# Patient Record
Sex: FEMALE | Race: WHITE | Employment: OTHER | URBAN - METROPOLITAN AREA
[De-identification: names, ages, dates, MRNs, and addresses within clinical notes are randomized per-mention and may not be internally consistent; named-entity substitution may affect disease eponyms.]

---

## 2017-07-01 PROBLEM — S32.000A CLOSED COMPRESSION FRACTURE OF LUMBAR VERTEBRA (HCC): Status: ACTIVE | Noted: 2017-07-01

## 2019-12-02 ENCOUNTER — APPOINTMENT (OUTPATIENT)
Dept: CT IMAGING | Age: 69
End: 2019-12-02
Payer: MEDICARE

## 2019-12-02 ENCOUNTER — HOSPITAL ENCOUNTER (OUTPATIENT)
Age: 69
Setting detail: OBSERVATION
Discharge: HOME | End: 2019-12-04
Attending: EMERGENCY MEDICINE | Admitting: INTERNAL MEDICINE
Payer: MEDICARE

## 2019-12-02 DIAGNOSIS — G45.9 TIA (TRANSIENT ISCHEMIC ATTACK): Primary | ICD-10-CM

## 2019-12-02 LAB
ALBUMIN SERPL-MCNC: 4.1 G/DL (ref 3.5–5.2)
ALP BLD-CCNC: 92 U/L (ref 35–104)
ALT SERPL-CCNC: 8 U/L (ref 0–32)
ANION GAP SERPL CALCULATED.3IONS-SCNC: 15 MMOL/L (ref 7–16)
APTT: 32.3 SEC (ref 24.5–35.1)
AST SERPL-CCNC: 17 U/L (ref 0–31)
BASOPHILS ABSOLUTE: 0.04 E9/L (ref 0–0.2)
BASOPHILS RELATIVE PERCENT: 0.5 % (ref 0–2)
BILIRUB SERPL-MCNC: 0.2 MG/DL (ref 0–1.2)
BUN BLDV-MCNC: 13 MG/DL (ref 8–23)
CALCIUM SERPL-MCNC: 8.8 MG/DL (ref 8.6–10.2)
CHLORIDE BLD-SCNC: 105 MMOL/L (ref 98–107)
CO2: 21 MMOL/L (ref 22–29)
CREAT SERPL-MCNC: 0.8 MG/DL (ref 0.5–1)
EOSINOPHILS ABSOLUTE: 0.07 E9/L (ref 0.05–0.5)
EOSINOPHILS RELATIVE PERCENT: 0.9 % (ref 0–6)
GFR AFRICAN AMERICAN: >60
GFR NON-AFRICAN AMERICAN: >60 ML/MIN/1.73
GLUCOSE BLD-MCNC: 91 MG/DL (ref 74–99)
HCT VFR BLD CALC: 39.1 % (ref 34–48)
HEMOGLOBIN: 13.1 G/DL (ref 11.5–15.5)
IMMATURE GRANULOCYTES #: 0.02 E9/L
IMMATURE GRANULOCYTES %: 0.3 % (ref 0–5)
INR BLD: 0.9
LYMPHOCYTES ABSOLUTE: 1.8 E9/L (ref 1.5–4)
LYMPHOCYTES RELATIVE PERCENT: 24.4 % (ref 20–42)
MCH RBC QN AUTO: 31.6 PG (ref 26–35)
MCHC RBC AUTO-ENTMCNC: 33.5 % (ref 32–34.5)
MCV RBC AUTO: 94.4 FL (ref 80–99.9)
MONOCYTES ABSOLUTE: 0.62 E9/L (ref 0.1–0.95)
MONOCYTES RELATIVE PERCENT: 8.4 % (ref 2–12)
NEUTROPHILS ABSOLUTE: 4.84 E9/L (ref 1.8–7.3)
NEUTROPHILS RELATIVE PERCENT: 65.5 % (ref 43–80)
PDW BLD-RTO: 12.5 FL (ref 11.5–15)
PLATELET # BLD: 274 E9/L (ref 130–450)
PMV BLD AUTO: 9.5 FL (ref 7–12)
POTASSIUM REFLEX MAGNESIUM: 3.7 MMOL/L (ref 3.5–5)
PROTHROMBIN TIME: 10.6 SEC (ref 9.3–12.4)
RBC # BLD: 4.14 E12/L (ref 3.5–5.5)
SODIUM BLD-SCNC: 141 MMOL/L (ref 132–146)
TOTAL PROTEIN: 7.7 G/DL (ref 6.4–8.3)
TROPONIN: <0.01 NG/ML (ref 0–0.03)
WBC # BLD: 7.4 E9/L (ref 4.5–11.5)

## 2019-12-02 PROCEDURE — 85610 PROTHROMBIN TIME: CPT

## 2019-12-02 PROCEDURE — 85025 COMPLETE CBC W/AUTO DIFF WBC: CPT

## 2019-12-02 PROCEDURE — 80053 COMPREHEN METABOLIC PANEL: CPT

## 2019-12-02 PROCEDURE — 36415 COLL VENOUS BLD VENIPUNCTURE: CPT

## 2019-12-02 PROCEDURE — 93005 ELECTROCARDIOGRAM TRACING: CPT | Performed by: EMERGENCY MEDICINE

## 2019-12-02 PROCEDURE — 84484 ASSAY OF TROPONIN QUANT: CPT

## 2019-12-02 PROCEDURE — 85730 THROMBOPLASTIN TIME PARTIAL: CPT

## 2019-12-02 PROCEDURE — 70450 CT HEAD/BRAIN W/O DYE: CPT

## 2019-12-02 PROCEDURE — 99285 EMERGENCY DEPT VISIT HI MDM: CPT

## 2019-12-02 ASSESSMENT — PAIN DESCRIPTION - PAIN TYPE: TYPE: ACUTE PAIN

## 2019-12-02 ASSESSMENT — PAIN DESCRIPTION - DESCRIPTORS: DESCRIPTORS: ACHING

## 2019-12-02 ASSESSMENT — PAIN DESCRIPTION - FREQUENCY: FREQUENCY: CONTINUOUS

## 2019-12-02 ASSESSMENT — PAIN DESCRIPTION - LOCATION: LOCATION: HEAD

## 2019-12-02 ASSESSMENT — PAIN DESCRIPTION - ORIENTATION: ORIENTATION: RIGHT

## 2019-12-02 ASSESSMENT — PAIN DESCRIPTION - PROGRESSION: CLINICAL_PROGRESSION: NOT CHANGED

## 2019-12-03 ENCOUNTER — APPOINTMENT (OUTPATIENT)
Dept: INTERVENTIONAL RADIOLOGY/VASCULAR | Age: 69
End: 2019-12-03
Payer: MEDICARE

## 2019-12-03 ENCOUNTER — APPOINTMENT (OUTPATIENT)
Dept: GENERAL RADIOLOGY | Age: 69
End: 2019-12-03
Payer: MEDICARE

## 2019-12-03 LAB
BACTERIA: NORMAL /HPF
BILIRUBIN URINE: NEGATIVE
BLOOD, URINE: ABNORMAL
CHOLESTEROL, TOTAL: 177 MG/DL (ref 0–199)
CLARITY: CLEAR
COLOR: ABNORMAL
EKG ATRIAL RATE: 86 BPM
EKG P AXIS: 76 DEGREES
EKG P-R INTERVAL: 160 MS
EKG Q-T INTERVAL: 386 MS
EKG QRS DURATION: 86 MS
EKG QTC CALCULATION (BAZETT): 461 MS
EKG R AXIS: -30 DEGREES
EKG T AXIS: 35 DEGREES
EKG VENTRICULAR RATE: 86 BPM
EPITHELIAL CELLS, UA: NORMAL /HPF
GLUCOSE URINE: NEGATIVE MG/DL
HDLC SERPL-MCNC: 65 MG/DL
KETONES, URINE: NEGATIVE MG/DL
LDL CHOLESTEROL CALCULATED: 96 MG/DL (ref 0–99)
LEUKOCYTE ESTERASE, URINE: ABNORMAL
NITRITE, URINE: NEGATIVE
PH UA: 7 (ref 5–9)
PROTEIN UA: NEGATIVE MG/DL
RBC UA: NORMAL /HPF (ref 0–2)
SPECIFIC GRAVITY UA: 1.01 (ref 1–1.03)
TRIGL SERPL-MCNC: 82 MG/DL (ref 0–149)
TSH SERPL DL<=0.05 MIU/L-ACNC: 1.4 UIU/ML (ref 0.27–4.2)
UROBILINOGEN, URINE: 0.2 E.U./DL
VLDLC SERPL CALC-MCNC: 16 MG/DL
WBC UA: NORMAL /HPF (ref 0–5)

## 2019-12-03 PROCEDURE — 71046 X-RAY EXAM CHEST 2 VIEWS: CPT

## 2019-12-03 PROCEDURE — 6370000000 HC RX 637 (ALT 250 FOR IP): Performed by: PSYCHIATRY & NEUROLOGY

## 2019-12-03 PROCEDURE — 96374 THER/PROPH/DIAG INJ IV PUSH: CPT

## 2019-12-03 PROCEDURE — 84443 ASSAY THYROID STIM HORMONE: CPT

## 2019-12-03 PROCEDURE — G0378 HOSPITAL OBSERVATION PER HR: HCPCS

## 2019-12-03 PROCEDURE — 80061 LIPID PANEL: CPT

## 2019-12-03 PROCEDURE — 93880 EXTRACRANIAL BILAT STUDY: CPT

## 2019-12-03 PROCEDURE — 81001 URINALYSIS AUTO W/SCOPE: CPT

## 2019-12-03 PROCEDURE — 93306 TTE W/DOPPLER COMPLETE: CPT

## 2019-12-03 PROCEDURE — 6370000000 HC RX 637 (ALT 250 FOR IP): Performed by: INTERNAL MEDICINE

## 2019-12-03 PROCEDURE — 6370000000 HC RX 637 (ALT 250 FOR IP): Performed by: EMERGENCY MEDICINE

## 2019-12-03 PROCEDURE — 2500000003 HC RX 250 WO HCPCS: Performed by: EMERGENCY MEDICINE

## 2019-12-03 PROCEDURE — 36415 COLL VENOUS BLD VENIPUNCTURE: CPT

## 2019-12-03 RX ORDER — NICOTINE 21 MG/24HR
1 PATCH, TRANSDERMAL 24 HOURS TRANSDERMAL DAILY
Status: DISCONTINUED | OUTPATIENT
Start: 2019-12-03 | End: 2019-12-04 | Stop reason: HOSPADM

## 2019-12-03 RX ORDER — ACETAMINOPHEN 325 MG/1
650 TABLET ORAL EVERY 6 HOURS PRN
Status: DISCONTINUED | OUTPATIENT
Start: 2019-12-03 | End: 2019-12-04 | Stop reason: HOSPADM

## 2019-12-03 RX ORDER — IBUPROFEN 800 MG/1
800 TABLET ORAL EVERY 6 HOURS PRN
Status: DISCONTINUED | OUTPATIENT
Start: 2019-12-03 | End: 2019-12-04 | Stop reason: HOSPADM

## 2019-12-03 RX ORDER — ASPIRIN 81 MG/1
81 TABLET ORAL DAILY
Status: DISCONTINUED | OUTPATIENT
Start: 2019-12-03 | End: 2019-12-04 | Stop reason: HOSPADM

## 2019-12-03 RX ORDER — ATORVASTATIN CALCIUM 20 MG/1
20 TABLET, FILM COATED ORAL NIGHTLY
Status: DISCONTINUED | OUTPATIENT
Start: 2019-12-03 | End: 2019-12-04 | Stop reason: HOSPADM

## 2019-12-03 RX ORDER — ASPIRIN 81 MG/1
324 TABLET, CHEWABLE ORAL ONCE
Status: COMPLETED | OUTPATIENT
Start: 2019-12-03 | End: 2019-12-03

## 2019-12-03 RX ADMIN — ASPIRIN 81 MG 324 MG: 81 TABLET ORAL at 00:35

## 2019-12-03 RX ADMIN — LIDOCAINE HYDROCHLORIDE: 20 SOLUTION ORAL; TOPICAL at 01:39

## 2019-12-03 RX ADMIN — ATORVASTATIN CALCIUM 20 MG: 20 TABLET, FILM COATED ORAL at 22:33

## 2019-12-03 RX ADMIN — FAMOTIDINE 20 MG: 10 INJECTION, SOLUTION INTRAVENOUS at 01:39

## 2019-12-03 RX ADMIN — ASPIRIN 81 MG: 81 TABLET, COATED ORAL at 07:50

## 2019-12-03 ASSESSMENT — PAIN SCALES - GENERAL
PAINLEVEL_OUTOF10: 0

## 2019-12-03 ASSESSMENT — ENCOUNTER SYMPTOMS
EYE REDNESS: 0
ABDOMINAL PAIN: 0
VOMITING: 0
NAUSEA: 0
SHORTNESS OF BREATH: 0

## 2019-12-04 VITALS
TEMPERATURE: 98 F | SYSTOLIC BLOOD PRESSURE: 119 MMHG | OXYGEN SATURATION: 94 % | RESPIRATION RATE: 18 BRPM | HEIGHT: 66 IN | BODY MASS INDEX: 20.27 KG/M2 | WEIGHT: 126.1 LBS | HEART RATE: 75 BPM | DIASTOLIC BLOOD PRESSURE: 73 MMHG

## 2019-12-04 PROCEDURE — 97162 PT EVAL MOD COMPLEX 30 MIN: CPT | Performed by: PHYSICAL THERAPIST

## 2019-12-04 PROCEDURE — 97165 OT EVAL LOW COMPLEX 30 MIN: CPT

## 2019-12-04 PROCEDURE — G0378 HOSPITAL OBSERVATION PER HR: HCPCS

## 2019-12-04 PROCEDURE — 6370000000 HC RX 637 (ALT 250 FOR IP): Performed by: INTERNAL MEDICINE

## 2019-12-04 PROCEDURE — 6370000000 HC RX 637 (ALT 250 FOR IP): Performed by: PSYCHIATRY & NEUROLOGY

## 2019-12-04 RX ORDER — ATORVASTATIN CALCIUM 20 MG/1
20 TABLET, FILM COATED ORAL NIGHTLY
Qty: 30 TABLET | Refills: 3 | Status: SHIPPED | OUTPATIENT
Start: 2019-12-04 | End: 2021-04-02 | Stop reason: ALTCHOICE

## 2019-12-04 RX ORDER — ASPIRIN 81 MG/1
81 TABLET ORAL DAILY
Qty: 30 TABLET | Refills: 3 | COMMUNITY
Start: 2019-12-05

## 2019-12-04 RX ORDER — NICOTINE 21 MG/24HR
1 PATCH, TRANSDERMAL 24 HOURS TRANSDERMAL EVERY 24 HOURS
Qty: 30 PATCH | Refills: 3 | Status: SHIPPED | OUTPATIENT
Start: 2019-12-04 | End: 2021-04-02 | Stop reason: ALTCHOICE

## 2019-12-04 RX ADMIN — IBUPROFEN 800 MG: 800 TABLET, FILM COATED ORAL at 00:22

## 2019-12-04 RX ADMIN — ASPIRIN 81 MG: 81 TABLET, COATED ORAL at 08:19

## 2019-12-04 ASSESSMENT — PAIN DESCRIPTION - FREQUENCY: FREQUENCY: CONTINUOUS

## 2019-12-04 ASSESSMENT — PAIN SCALES - GENERAL
PAINLEVEL_OUTOF10: 3
PAINLEVEL_OUTOF10: 0

## 2019-12-04 ASSESSMENT — PAIN DESCRIPTION - PROGRESSION
CLINICAL_PROGRESSION: NOT CHANGED
CLINICAL_PROGRESSION: NOT CHANGED

## 2019-12-04 ASSESSMENT — PAIN DESCRIPTION - DESCRIPTORS: DESCRIPTORS: ACHING;HEADACHE

## 2019-12-04 ASSESSMENT — PAIN DESCRIPTION - PAIN TYPE: TYPE: ACUTE PAIN

## 2019-12-04 ASSESSMENT — PAIN - FUNCTIONAL ASSESSMENT: PAIN_FUNCTIONAL_ASSESSMENT: ACTIVITIES ARE NOT PREVENTED

## 2019-12-04 ASSESSMENT — PAIN DESCRIPTION - ONSET: ONSET: ON-GOING

## 2019-12-04 ASSESSMENT — PAIN DESCRIPTION - LOCATION: LOCATION: HEAD

## 2020-01-03 ENCOUNTER — APPOINTMENT (OUTPATIENT)
Dept: CT IMAGING | Age: 70
End: 2020-01-03
Payer: MEDICARE

## 2020-01-03 ENCOUNTER — HOSPITAL ENCOUNTER (EMERGENCY)
Age: 70
Discharge: HOME | End: 2020-01-03
Attending: EMERGENCY MEDICINE
Payer: MEDICARE

## 2020-01-03 VITALS
SYSTOLIC BLOOD PRESSURE: 116 MMHG | TEMPERATURE: 97.7 F | BODY MASS INDEX: 20.57 KG/M2 | HEART RATE: 85 BPM | WEIGHT: 128 LBS | RESPIRATION RATE: 16 BRPM | DIASTOLIC BLOOD PRESSURE: 79 MMHG | OXYGEN SATURATION: 95 % | HEIGHT: 66 IN

## 2020-01-03 LAB
ALBUMIN SERPL-MCNC: 3.7 G/DL (ref 3.5–5.2)
ALP BLD-CCNC: 109 U/L (ref 35–104)
ALT SERPL-CCNC: 22 U/L (ref 0–32)
ANION GAP SERPL CALCULATED.3IONS-SCNC: 10 MMOL/L (ref 7–16)
AST SERPL-CCNC: 25 U/L (ref 0–31)
BILIRUB SERPL-MCNC: 0.2 MG/DL (ref 0–1.2)
BILIRUBIN DIRECT: <0.2 MG/DL (ref 0–0.3)
BILIRUBIN, INDIRECT: ABNORMAL MG/DL (ref 0–1)
BUN BLDV-MCNC: 17 MG/DL (ref 8–23)
CALCIUM SERPL-MCNC: 8.7 MG/DL (ref 8.6–10.2)
CHLORIDE BLD-SCNC: 104 MMOL/L (ref 98–107)
CO2: 24 MMOL/L (ref 22–29)
CREAT SERPL-MCNC: 0.8 MG/DL (ref 0.5–1)
GFR AFRICAN AMERICAN: >60
GFR NON-AFRICAN AMERICAN: >60 ML/MIN/1.73
GLUCOSE BLD-MCNC: 108 MG/DL (ref 74–99)
HCT VFR BLD CALC: 38.8 % (ref 34–48)
HEMOGLOBIN: 12.9 G/DL (ref 11.5–15.5)
LACTIC ACID: 1.3 MMOL/L (ref 0.5–2.2)
LIPASE: 39 U/L (ref 13–60)
MCH RBC QN AUTO: 31.4 PG (ref 26–35)
MCHC RBC AUTO-ENTMCNC: 33.2 % (ref 32–34.5)
MCV RBC AUTO: 94.4 FL (ref 80–99.9)
PDW BLD-RTO: 12.1 FL (ref 11.5–15)
PLATELET # BLD: 274 E9/L (ref 130–450)
PMV BLD AUTO: 9.3 FL (ref 7–12)
POTASSIUM SERPL-SCNC: 4.2 MMOL/L (ref 3.5–5)
RBC # BLD: 4.11 E12/L (ref 3.5–5.5)
SODIUM BLD-SCNC: 138 MMOL/L (ref 132–146)
TOTAL PROTEIN: 7.1 G/DL (ref 6.4–8.3)
TROPONIN: <0.01 NG/ML (ref 0–0.03)
WBC # BLD: 5.5 E9/L (ref 4.5–11.5)

## 2020-01-03 PROCEDURE — 83605 ASSAY OF LACTIC ACID: CPT

## 2020-01-03 PROCEDURE — 94760 N-INVAS EAR/PLS OXIMETRY 1: CPT

## 2020-01-03 PROCEDURE — 80076 HEPATIC FUNCTION PANEL: CPT

## 2020-01-03 PROCEDURE — 93005 ELECTROCARDIOGRAM TRACING: CPT | Performed by: EMERGENCY MEDICINE

## 2020-01-03 PROCEDURE — 74174 CTA ABD&PLVS W/CONTRAST: CPT

## 2020-01-03 PROCEDURE — 71275 CT ANGIOGRAPHY CHEST: CPT

## 2020-01-03 PROCEDURE — 80048 BASIC METABOLIC PNL TOTAL CA: CPT

## 2020-01-03 PROCEDURE — 85027 COMPLETE CBC AUTOMATED: CPT

## 2020-01-03 PROCEDURE — 83690 ASSAY OF LIPASE: CPT

## 2020-01-03 PROCEDURE — 36415 COLL VENOUS BLD VENIPUNCTURE: CPT

## 2020-01-03 PROCEDURE — 84484 ASSAY OF TROPONIN QUANT: CPT

## 2020-01-03 PROCEDURE — 2580000003 HC RX 258: Performed by: EMERGENCY MEDICINE

## 2020-01-03 PROCEDURE — 6360000004 HC RX CONTRAST MEDICATION: Performed by: RADIOLOGY

## 2020-01-03 PROCEDURE — 99284 EMERGENCY DEPT VISIT MOD MDM: CPT

## 2020-01-03 RX ORDER — SODIUM CHLORIDE 0.9 % (FLUSH) 0.9 %
10 SYRINGE (ML) INJECTION PRN
Status: DISCONTINUED | OUTPATIENT
Start: 2020-01-03 | End: 2020-01-03 | Stop reason: HOSPADM

## 2020-01-03 RX ORDER — 0.9 % SODIUM CHLORIDE 0.9 %
1000 INTRAVENOUS SOLUTION INTRAVENOUS ONCE
Status: COMPLETED | OUTPATIENT
Start: 2020-01-03 | End: 2020-01-03

## 2020-01-03 RX ADMIN — IOPAMIDOL 80 ML: 755 INJECTION, SOLUTION INTRAVENOUS at 15:18

## 2020-01-03 RX ADMIN — SODIUM CHLORIDE 1000 ML: 9 INJECTION, SOLUTION INTRAVENOUS at 14:08

## 2020-01-03 ASSESSMENT — ENCOUNTER SYMPTOMS
SHORTNESS OF BREATH: 0
DIARRHEA: 0
COUGH: 0
EYE DISCHARGE: 0
BACK PAIN: 0
VOMITING: 0
EYE PAIN: 0
ABDOMINAL DISTENTION: 0
WHEEZING: 0
SINUS PRESSURE: 0
NAUSEA: 0
SORE THROAT: 0
EYE REDNESS: 0

## 2020-01-03 NOTE — ED PROVIDER NOTES
Patient states she was up washing the nurses when she suddenly had a stabbing pain between her shoulder blades. The pain was so bad that she felt she was getting pass out. She made it to a chair where she believes she passed out for \"a brief second. \". She states the pain lasted 10 to 15 minutes or so then fairly rapidly resolved. No pain currently. No shortness of breath but she did have some nausea. No emesis. The history is provided by the patient. Loss of Consciousness   Episode history:  Single  Most recent episode: Today  Timing:  Constant  Progression:  Resolved  Chronicity:  New  Context comment:  Pain  Witnessed: no    Relieved by:  None tried  Worsened by:  Nothing  Ineffective treatments:  None tried  Associated symptoms: no chest pain, no fever, no headaches, no nausea, no shortness of breath, no vomiting and no weakness         Review of Systems   Constitutional: Negative for chills and fever. HENT: Negative for ear pain, sinus pressure and sore throat. Eyes: Negative for pain, discharge and redness. Respiratory: Negative for cough, shortness of breath and wheezing. Cardiovascular: Positive for syncope. Negative for chest pain. Gastrointestinal: Negative for abdominal distention, diarrhea, nausea and vomiting. Genitourinary: Negative for dysuria and frequency. Musculoskeletal: Negative for arthralgias and back pain. Skin: Negative for rash and wound. Neurological: Negative for weakness and headaches. Hematological: Negative for adenopathy. All other systems reviewed and are negative. Physical Exam  Vitals signs and nursing note reviewed. Constitutional:       Appearance: She is well-developed. HENT:      Head: Normocephalic and atraumatic. Eyes:      Pupils: Pupils are equal, round, and reactive to light. Neck:      Musculoskeletal: Normal range of motion and neck supple. Cardiovascular:      Rate and Rhythm: Normal rate and regular rhythm.       Heart sounds: Normal heart sounds. No murmur. Pulmonary:      Effort: Pulmonary effort is normal. No respiratory distress. Breath sounds: Normal breath sounds. No wheezing or rales. Abdominal:      General: Bowel sounds are normal.      Palpations: Abdomen is soft. Tenderness: There is no tenderness. There is no guarding or rebound. Skin:     General: Skin is warm and dry. Neurological:      Mental Status: She is alert and oriented to person, place, and time. Cranial Nerves: No cranial nerve deficit. Coordination: Coordination normal.          Procedures     MDM     EKG: This EKG is signed and interpreted by me. Rate: 73  Rhythm: Sinus  Interpretation: no acute changes and non-specific EKG, old inferior infarct. Comparison: stable as compared to patient's most recent EKG           --------------------------------------------- PAST HISTORY ---------------------------------------------  Past Medical History:  has a past medical history of AAA [facility]ab[facility] Arthritis, Blood circulation, collateral, CAD (coronary artery disease), COPD (chronic obstructive pulmonary disease) ([facility] Emphysema with chronic bronchitis ([facility] GERD (gastroesophageal reflux disease), Hypothyroidism, MI (myocardial infarction) ([facility] Substance abuse ([facility] Thoracic outlet syndrome, TIA (transient ischemic attack), and Unspecified cerebral artery occlusion with cerebral infarction. Past Surgical History:  has a past surgical history that includes Tonsillectomy and adenoidectomy; Uterine Suspension; Appendectomy; Ovary removal; and Tubal ligation (Bilateral). Social History:  reports that she has been smoking cigarettes. She has a 51.00 pack-year smoking history. She has never used smokeless tobacco. She reports previous alcohol use. She reports that she does not use drugs.     Family History: family history includes Cancer in her father, mother, and another family member; Dementia in her mother; Diabetes in her mother; Heart Disease in her father and mother; Liver Disease in her father. The patients home medications have been reviewed. Allergies: Penicillins; Tetanus toxoids; Ciprofloxacin; Effexor [venlafaxine]; Betadine [povidone iodine];  Codeine; Demerol; Sulfa antibiotics; and Tape [adhesive tape]    -------------------------------------------------- RESULTS -------------------------------------------------  Labs:  Results for orders placed or performed during the hospital encounter of 62/41/61   Basic metabolic panel   Result Value Ref Range    Sodium 138 132 - 146 mmol/L    Potassium 4.2 3.5 - 5.0 mmol/L    Chloride 104 98 - 107 mmol/L    CO2 24 22 - 29 mmol/L    Anion Gap 10 7 - 16 mmol/L    Glucose 108 (H) 74 - 99 mg/dL    BUN 17 8 - 23 mg/dL    CREATININE 0.8 0.5 - 1.0 mg/dL    GFR Non-African American >60 >=60 mL/min/1.73    GFR African American >60     Calcium 8.7 8.6 - 10.2 mg/dL   CBC   Result Value Ref Range    WBC 5.5 4.5 - 11.5 E9/L    RBC 4.11 3.50 - 5.50 E12/L    Hemoglobin 12.9 11.5 - 15.5 g/dL    Hematocrit 38.8 34.0 - 48.0 %    MCV 94.4 80.0 - 99.9 fL    MCH 31.4 26.0 - 35.0 pg    MCHC 33.2 32.0 - 34.5 %    RDW 12.1 11.5 - 15.0 fL    Platelets 461 042 - 300 E9/L    MPV 9.3 7.0 - 12.0 fL   Troponin   Result Value Ref Range    Troponin <0.01 0.00 - 0.03 ng/mL   Lipase   Result Value Ref Range    Lipase 39 13 - 60 U/L   Hepatic Function Panel   Result Value Ref Range    Total Protein 7.1 6.4 - 8.3 g/dL    Alb 3.7 3.5 - 5.2 g/dL    Alkaline Phosphatase 109 (H) 35 - 104 U/L    ALT 22 0 - 32 U/L    AST 25 0 - 31 U/L    Total Bilirubin 0.2 0.0 - 1.2 mg/dL    Bilirubin, Direct <0.2 0.0 - 0.3 mg/dL    Bilirubin, Indirect see below 0.0 - 1.0 mg/dL   Lactic Acid, Plasma   Result Value Ref Range    Lactic Acid 1.3 0.5 - 2.2 mmol/L   EKG 12 Lead   Result Value Ref Range    Ventricular Rate 73 BPM    Atrial Rate 73 BPM    P-R Interval 178 ms    QRS Duration 84 ms    Q-T Interval 424 ms    QTc Calculation (Bazett) 467 ms    P Axis 70 degrees    R Axis -16 degrees    T Axis 25 degrees       Radiology:  CTA ABDOMEN PELVIS W CONTRAST   Final Result   1. There is no evidence of a dissection of the abdominal aorta. 2. Ectasia of the suprarenal abdominal aorta measuring 3.0 x 2.7 cm in   maximum AP and transverse diameter. 3. Sigmoid diverticulosis. There are no findings of diverticulitis. CTA CHEST W CONTRAST    (Results Pending)       ------------------------- NURSING NOTES AND VITALS REVIEWED ---------------------------  Date / Time Roomed:  1/3/2020  1:26 PM  ED Bed Assignment:  19/19    The nursing notes within the ED encounter and vital signs as below have been reviewed. /69   Pulse 72   Temp 97.7 °F (36.5 °C) (Oral)   Resp 15   Ht 5' 6\" (1.676 m)   Wt 128 lb (58.1 kg)   SpO2 97%   BMI 20.66 kg/m²   Oxygen Saturation Interpretation: Normal      ------------------------------------------ PROGRESS NOTES ------------------------------------------  4:02 PM  I have spoken with the patient and discussed todays results, in addition to providing specific details for the plan of care and counseling regarding the diagnosis and prognosis. Their questions are answered at this time and they are agreeable with the plan. I discussed at length with them reasons for immediate return here for re evaluation. They will followup with their primary care physician by calling their office on Monday.      --------------------------------- ADDITIONAL PROVIDER NOTES ---------------------------------  At this time the patient is without objective evidence of an acute process requiring hospitalization or inpatient management. They have remained hemodynamically stable throughout their entire ED visit and are stable for discharge with outpatient follow-up.      The plan has been discussed in detail and they are aware of the specific conditions for emergent return, as well as the importance of

## 2020-01-04 LAB
EKG ATRIAL RATE: 73 BPM
EKG P AXIS: 70 DEGREES
EKG P-R INTERVAL: 178 MS
EKG Q-T INTERVAL: 424 MS
EKG QRS DURATION: 84 MS
EKG QTC CALCULATION (BAZETT): 467 MS
EKG R AXIS: -16 DEGREES
EKG T AXIS: 25 DEGREES
EKG VENTRICULAR RATE: 73 BPM

## 2020-01-04 PROCEDURE — 93010 ELECTROCARDIOGRAM REPORT: CPT | Performed by: INTERNAL MEDICINE

## 2021-04-02 ENCOUNTER — HOSPITAL ENCOUNTER (INPATIENT)
Age: 71
LOS: 11 days | Discharge: HOME | DRG: 177 | End: 2021-04-13
Attending: EMERGENCY MEDICINE | Admitting: INTERNAL MEDICINE
Payer: MEDICARE

## 2021-04-02 ENCOUNTER — APPOINTMENT (OUTPATIENT)
Dept: GENERAL RADIOLOGY | Age: 71
DRG: 177 | End: 2021-04-02
Payer: MEDICARE

## 2021-04-02 ENCOUNTER — APPOINTMENT (OUTPATIENT)
Dept: CT IMAGING | Age: 71
DRG: 177 | End: 2021-04-02
Payer: MEDICARE

## 2021-04-02 DIAGNOSIS — J44.1 COPD EXACERBATION (HCC): ICD-10-CM

## 2021-04-02 DIAGNOSIS — J18.9 COMMUNITY ACQUIRED PNEUMONIA OF RIGHT LOWER LOBE OF LUNG: Primary | ICD-10-CM

## 2021-04-02 LAB
ANION GAP SERPL CALCULATED.3IONS-SCNC: 12 MMOL/L (ref 7–16)
BASOPHILS ABSOLUTE: 0.01 E9/L (ref 0–0.2)
BASOPHILS RELATIVE PERCENT: 0.3 % (ref 0–2)
BUN BLDV-MCNC: 13 MG/DL (ref 8–23)
CALCIUM SERPL-MCNC: 7.7 MG/DL (ref 8.6–10.2)
CHLORIDE BLD-SCNC: 106 MMOL/L (ref 98–107)
CO2: 20 MMOL/L (ref 22–29)
CREAT SERPL-MCNC: 0.8 MG/DL (ref 0.5–1)
EKG ATRIAL RATE: 95 BPM
EKG P AXIS: 81 DEGREES
EKG P-R INTERVAL: 156 MS
EKG Q-T INTERVAL: 356 MS
EKG QRS DURATION: 84 MS
EKG QTC CALCULATION (BAZETT): 447 MS
EKG R AXIS: -36 DEGREES
EKG T AXIS: 51 DEGREES
EKG VENTRICULAR RATE: 95 BPM
EOSINOPHILS ABSOLUTE: 0 E9/L (ref 0.05–0.5)
EOSINOPHILS RELATIVE PERCENT: 0 % (ref 0–6)
GFR AFRICAN AMERICAN: >60
GFR NON-AFRICAN AMERICAN: >60 ML/MIN/1.73
GLUCOSE BLD-MCNC: 85 MG/DL (ref 74–99)
HCT VFR BLD CALC: 40.6 % (ref 34–48)
HEMOGLOBIN: 13.5 G/DL (ref 11.5–15.5)
IMMATURE GRANULOCYTES #: 0 E9/L
IMMATURE GRANULOCYTES %: 0 % (ref 0–5)
INR BLD: 1
LACTIC ACID, SEPSIS: 1 MMOL/L (ref 0.5–1.9)
LACTIC ACID, SEPSIS: 2.3 MMOL/L (ref 0.5–1.9)
LYMPHOCYTES ABSOLUTE: 0.85 E9/L (ref 1.5–4)
LYMPHOCYTES RELATIVE PERCENT: 26.9 % (ref 20–42)
MAGNESIUM: 2 MG/DL (ref 1.6–2.6)
MCH RBC QN AUTO: 31.2 PG (ref 26–35)
MCHC RBC AUTO-ENTMCNC: 33.3 % (ref 32–34.5)
MCV RBC AUTO: 93.8 FL (ref 80–99.9)
MONOCYTES ABSOLUTE: 0.43 E9/L (ref 0.1–0.95)
MONOCYTES RELATIVE PERCENT: 13.6 % (ref 2–12)
NEUTROPHILS ABSOLUTE: 1.87 E9/L (ref 1.8–7.3)
NEUTROPHILS RELATIVE PERCENT: 59.2 % (ref 43–80)
PDW BLD-RTO: 12.9 FL (ref 11.5–15)
PLATELET # BLD: 217 E9/L (ref 130–450)
PMV BLD AUTO: 10 FL (ref 7–12)
POTASSIUM REFLEX MAGNESIUM: 3.4 MMOL/L (ref 3.5–5)
PRO-BNP: 96 PG/ML (ref 0–125)
PROTHROMBIN TIME: 12.1 SEC (ref 9.3–12.4)
RBC # BLD: 4.33 E12/L (ref 3.5–5.5)
SARS-COV-2, NAAT: NOT DETECTED
SODIUM BLD-SCNC: 138 MMOL/L (ref 132–146)
TROPONIN: <0.01 NG/ML (ref 0–0.03)
WBC # BLD: 3.2 E9/L (ref 4.5–11.5)

## 2021-04-02 PROCEDURE — 96361 HYDRATE IV INFUSION ADD-ON: CPT

## 2021-04-02 PROCEDURE — 87635 SARS-COV-2 COVID-19 AMP PRB: CPT

## 2021-04-02 PROCEDURE — 6370000000 HC RX 637 (ALT 250 FOR IP): Performed by: INTERNAL MEDICINE

## 2021-04-02 PROCEDURE — 2700000000 HC OXYGEN THERAPY PER DAY

## 2021-04-02 PROCEDURE — 1200000000 HC SEMI PRIVATE

## 2021-04-02 PROCEDURE — 99285 EMERGENCY DEPT VISIT HI MDM: CPT

## 2021-04-02 PROCEDURE — 84484 ASSAY OF TROPONIN QUANT: CPT

## 2021-04-02 PROCEDURE — 6370000000 HC RX 637 (ALT 250 FOR IP): Performed by: EMERGENCY MEDICINE

## 2021-04-02 PROCEDURE — 85025 COMPLETE CBC W/AUTO DIFF WBC: CPT

## 2021-04-02 PROCEDURE — 85610 PROTHROMBIN TIME: CPT

## 2021-04-02 PROCEDURE — 87040 BLOOD CULTURE FOR BACTERIA: CPT

## 2021-04-02 PROCEDURE — 6360000002 HC RX W HCPCS: Performed by: EMERGENCY MEDICINE

## 2021-04-02 PROCEDURE — 80048 BASIC METABOLIC PNL TOTAL CA: CPT

## 2021-04-02 PROCEDURE — 93005 ELECTROCARDIOGRAM TRACING: CPT | Performed by: EMERGENCY MEDICINE

## 2021-04-02 PROCEDURE — XW033E5 INTRODUCTION OF REMDESIVIR ANTI-INFECTIVE INTO PERIPHERAL VEIN, PERCUTANEOUS APPROACH, NEW TECHNOLOGY GROUP 5: ICD-10-PCS | Performed by: INTERNAL MEDICINE

## 2021-04-02 PROCEDURE — 36415 COLL VENOUS BLD VENIPUNCTURE: CPT

## 2021-04-02 PROCEDURE — 2580000003 HC RX 258: Performed by: EMERGENCY MEDICINE

## 2021-04-02 PROCEDURE — 83605 ASSAY OF LACTIC ACID: CPT

## 2021-04-02 PROCEDURE — 6360000002 HC RX W HCPCS: Performed by: INTERNAL MEDICINE

## 2021-04-02 PROCEDURE — 83880 ASSAY OF NATRIURETIC PEPTIDE: CPT

## 2021-04-02 PROCEDURE — 83735 ASSAY OF MAGNESIUM: CPT

## 2021-04-02 PROCEDURE — 94640 AIRWAY INHALATION TREATMENT: CPT

## 2021-04-02 PROCEDURE — 71250 CT THORAX DX C-: CPT

## 2021-04-02 PROCEDURE — 96374 THER/PROPH/DIAG INJ IV PUSH: CPT

## 2021-04-02 PROCEDURE — 71046 X-RAY EXAM CHEST 2 VIEWS: CPT

## 2021-04-02 RX ORDER — IPRATROPIUM BROMIDE AND ALBUTEROL SULFATE 2.5; .5 MG/3ML; MG/3ML
1 SOLUTION RESPIRATORY (INHALATION)
Status: DISCONTINUED | OUTPATIENT
Start: 2021-04-02 | End: 2021-04-07

## 2021-04-02 RX ORDER — DOXYCYCLINE HYCLATE 100 MG/1
100 CAPSULE ORAL ONCE
Status: COMPLETED | OUTPATIENT
Start: 2021-04-02 | End: 2021-04-02

## 2021-04-02 RX ORDER — POTASSIUM CHLORIDE 20 MEQ/1
20 TABLET, EXTENDED RELEASE ORAL 2 TIMES DAILY WITH MEALS
Status: DISCONTINUED | OUTPATIENT
Start: 2021-04-02 | End: 2021-04-05

## 2021-04-02 RX ORDER — ASPIRIN 81 MG/1
81 TABLET ORAL DAILY
Status: DISCONTINUED | OUTPATIENT
Start: 2021-04-02 | End: 2021-04-13 | Stop reason: HOSPADM

## 2021-04-02 RX ORDER — IBUPROFEN 800 MG/1
800 TABLET ORAL EVERY 8 HOURS PRN
Status: DISCONTINUED | OUTPATIENT
Start: 2021-04-02 | End: 2021-04-13 | Stop reason: HOSPADM

## 2021-04-02 RX ORDER — CALCIUM CARBONATE 1000 MG/1
1000 TABLET, CHEWABLE ORAL 3 TIMES DAILY PRN
COMMUNITY

## 2021-04-02 RX ORDER — ACETAMINOPHEN 500 MG
500 TABLET ORAL EVERY 6 HOURS PRN
Status: DISCONTINUED | OUTPATIENT
Start: 2021-04-02 | End: 2021-04-13 | Stop reason: HOSPADM

## 2021-04-02 RX ORDER — 0.9 % SODIUM CHLORIDE 0.9 %
1000 INTRAVENOUS SOLUTION INTRAVENOUS ONCE
Status: COMPLETED | OUTPATIENT
Start: 2021-04-02 | End: 2021-04-02

## 2021-04-02 RX ORDER — SODIUM CHLORIDE AND POTASSIUM CHLORIDE .9; .15 G/100ML; G/100ML
SOLUTION INTRAVENOUS CONTINUOUS
Status: DISCONTINUED | OUTPATIENT
Start: 2021-04-02 | End: 2021-04-05

## 2021-04-02 RX ORDER — AZITHROMYCIN 250 MG/1
500 TABLET, FILM COATED ORAL DAILY
Status: DISCONTINUED | OUTPATIENT
Start: 2021-04-02 | End: 2021-04-13 | Stop reason: HOSPADM

## 2021-04-02 RX ORDER — CALCIUM CARBONATE 200(500)MG
1 TABLET,CHEWABLE ORAL 3 TIMES DAILY PRN
Status: DISCONTINUED | OUTPATIENT
Start: 2021-04-02 | End: 2021-04-13 | Stop reason: HOSPADM

## 2021-04-02 RX ORDER — IBUPROFEN 200 MG
400 TABLET ORAL EVERY 6 HOURS PRN
COMMUNITY

## 2021-04-02 RX ORDER — IPRATROPIUM BROMIDE AND ALBUTEROL SULFATE 2.5; .5 MG/3ML; MG/3ML
3 SOLUTION RESPIRATORY (INHALATION) ONCE
Status: COMPLETED | OUTPATIENT
Start: 2021-04-02 | End: 2021-04-02

## 2021-04-02 RX ORDER — ASPIRIN 81 MG/1
81 TABLET ORAL DAILY
Status: DISCONTINUED | OUTPATIENT
Start: 2021-04-02 | End: 2021-04-02 | Stop reason: SDUPTHER

## 2021-04-02 RX ORDER — BUDESONIDE 0.5 MG/2ML
500 INHALANT ORAL 2 TIMES DAILY
Status: DISCONTINUED | OUTPATIENT
Start: 2021-04-02 | End: 2021-04-07

## 2021-04-02 RX ORDER — KETOROLAC TROMETHAMINE 30 MG/ML
15 INJECTION, SOLUTION INTRAMUSCULAR; INTRAVENOUS ONCE
Status: COMPLETED | OUTPATIENT
Start: 2021-04-02 | End: 2021-04-02

## 2021-04-02 RX ORDER — ACETAMINOPHEN 160 MG
2000 TABLET,DISINTEGRATING ORAL DAILY
COMMUNITY

## 2021-04-02 RX ORDER — GUAIFENESIN/DEXTROMETHORPHAN 100-10MG/5
10 SYRUP ORAL EVERY 4 HOURS PRN
Status: DISCONTINUED | OUTPATIENT
Start: 2021-04-02 | End: 2021-04-13 | Stop reason: HOSPADM

## 2021-04-02 RX ADMIN — IPRATROPIUM BROMIDE AND ALBUTEROL SULFATE 3 AMPULE: .5; 3 SOLUTION RESPIRATORY (INHALATION) at 14:35

## 2021-04-02 RX ADMIN — BUDESONIDE 500 MCG: 0.5 INHALANT RESPIRATORY (INHALATION) at 22:25

## 2021-04-02 RX ADMIN — POTASSIUM CHLORIDE 20 MEQ: 20 TABLET, EXTENDED RELEASE ORAL at 17:40

## 2021-04-02 RX ADMIN — POTASSIUM CHLORIDE AND SODIUM CHLORIDE: 900; 150 INJECTION, SOLUTION INTRAVENOUS at 18:39

## 2021-04-02 RX ADMIN — IPRATROPIUM BROMIDE AND ALBUTEROL SULFATE 1 AMPULE: .5; 3 SOLUTION RESPIRATORY (INHALATION) at 22:24

## 2021-04-02 RX ADMIN — ENOXAPARIN SODIUM 40 MG: 40 INJECTION SUBCUTANEOUS at 17:40

## 2021-04-02 RX ADMIN — SODIUM CHLORIDE 1000 ML: 9 INJECTION, SOLUTION INTRAVENOUS at 14:50

## 2021-04-02 RX ADMIN — KETOROLAC TROMETHAMINE 15 MG: 30 INJECTION, SOLUTION INTRAMUSCULAR at 15:27

## 2021-04-02 RX ADMIN — AZITHROMYCIN MONOHYDRATE 500 MG: 250 TABLET ORAL at 17:40

## 2021-04-02 RX ADMIN — DOXYCYCLINE HYCLATE 100 MG: 100 CAPSULE ORAL at 15:53

## 2021-04-02 ASSESSMENT — PAIN DESCRIPTION - FREQUENCY: FREQUENCY: INTERMITTENT

## 2021-04-02 ASSESSMENT — ENCOUNTER SYMPTOMS
NAUSEA: 0
DIARRHEA: 0
BLOOD IN STOOL: 0
VOMITING: 0
COLOR CHANGE: 0
COUGH: 1
SHORTNESS OF BREATH: 1
RHINORRHEA: 0
WHEEZING: 1
CHEST TIGHTNESS: 1
PHOTOPHOBIA: 0
ABDOMINAL PAIN: 0

## 2021-04-02 ASSESSMENT — PAIN - FUNCTIONAL ASSESSMENT: PAIN_FUNCTIONAL_ASSESSMENT: ACTIVITIES ARE NOT PREVENTED

## 2021-04-02 ASSESSMENT — PAIN DESCRIPTION - ONSET: ONSET: ON-GOING

## 2021-04-02 ASSESSMENT — PAIN SCALES - GENERAL
PAINLEVEL_OUTOF10: 7
PAINLEVEL_OUTOF10: 8

## 2021-04-02 ASSESSMENT — PAIN DESCRIPTION - PROGRESSION: CLINICAL_PROGRESSION: GRADUALLY WORSENING

## 2021-04-02 ASSESSMENT — PAIN DESCRIPTION - PAIN TYPE: TYPE: ACUTE PAIN

## 2021-04-02 NOTE — H&P
History:   Procedure Laterality Date    APPENDECTOMY      OVARY REMOVAL      also tube on right removed    TONSILLECTOMY AND ADENOIDECTOMY      TUBAL LIGATION Bilateral     UTERINE SUSPENSION         Medications Prior to Admission:    @  Prior to Admission medications    Medication Sig Start Date End Date Taking? Authorizing Provider   Calcium Carbonate Antacid (TUMS ULTRA 1000) 1000 MG CHEW Take 1,000 mg by mouth 3 times daily as needed (Reflux)   Yes Historical Provider, MD   ibuprofen (ADVIL;MOTRIN) 200 MG tablet Take 400 mg by mouth every 6 hours as needed for Pain   Yes Historical Provider, MD   Cholecalciferol (VITAMIN D3) 50 MCG (2000 UT) CAPS Take 2,000 Units by mouth daily   Yes Historical Provider, MD   aspirin 81 MG EC tablet Take 1 tablet by mouth daily 12/5/19  Yes Fiordaliza Pineda DO       Allergies:  Penicillins, Tetanus toxoids, Ciprofloxacin, Effexor [venlafaxine], Kiwi extract, Betadine [povidone iodine], Codeine, Demerol, Sulfa antibiotics, and Tape [employer] tape]    Social History:   Social History     Socioeconomic History    Marital status:       Spouse name: Not on file    Number of children: Not on file    Years of education: Not on file    Highest education level: Not on file   Occupational History    Not on file   Social Needs    Financial resource strain: Not on file    Food insecurity     Worry: Not on file     Inability: Not on file    Transportation needs     Medical: Not on file     Non-medical: Not on file   Tobacco Use    Smoking status: Current Some Day Smoker     Packs/day: 1.00     Years: 51.00     Pack years: 51.00     Types: Cigarettes    Smokeless tobacco: Never Used    Tobacco comment: IN THE PROCESS OF QUITTING   Substance and Sexual Activity    Alcohol use: Not Currently     Comment: occasional    Drug use: No    Sexual activity: Not on file   Lifestyle    Physical activity     Days per week: Not on file     Minutes per session: Not on file    Stress: Not on file   Relationships    Social connections     Talks on phone: Not on file     Gets together: Not on file     Attends Protestant service: Not on file     Active member of club or organization: Not on file     Attends meetings of clubs or organizations: Not on file     Relationship status: Not on file    Intimate partner violence     Fear of current or ex partner: Not on file     Emotionally abused: Not on file     Physically abused: Not on file     Forced sexual activity: Not on file   Other Topics Concern    Not on file   Social History Narrative    Not on file       Family History:   Family History   Problem Relation Age of Onset    Liver Disease Father     Cancer Father     Heart Disease Father     Cancer Other         breast    Cancer Mother         breast    Heart Disease Mother     Dementia Mother     Diabetes Mother        REVIEW OF SYSTEMS:    Gen: Patient denies any lightheadedness or dizziness. No LOC or syncope. No fevers or chills. HEENT: No earache, sore throat or nasal congestion. Resp: +cough with scant white productive sputum, no hemoptysis   Cardiac: Denies chest pain, SOB, diaphoresis or palpitations. GI: No nausea, vomiting, diarrhea or constipation. No melena or hematochezia. : No urinary complaints, dysuria, hematuria or frequency. MSK: No extremity weakness, paralysis or paresthesias. PHYSICAL EXAM:    Vitals:  BP (!) 113/55   Pulse 83   Temp 97.9 °F (36.6 °C) (Oral)   Resp 20   Ht 5' 6\" (1.676 m)   Wt 148 lb (67.1 kg)   SpO2 95%   BMI 23.89 kg/m²     General:  This is a 73 y.o. yo female who is alert and oriented in NAD  HEENT:  Head is normocephalic and atraumatic, PERRLA, EOMI, mucus membranes moist with no pharyngeal erythema or exudate. Neck:  Supple with no carotid bruits, JVD or thyromegaly.   No cervical adenopathy  CV:  Regular rate and rhythm, no murmurs  Lungs: Coarse decreased breath sounds to auscultation bilaterally with Negative 12/03/2019    BLOODU TRACE 12/03/2019    GLUCOSEU Negative 12/03/2019     ABG:  No results found for: PH, PCO2, PO2, HCO3, BE, THGB, TCO2, O2SAT  HgBA1c:    Lab Results   Component Value Date    LABA1C 5.2 07/02/2017     FLP:    Lab Results   Component Value Date    TRIG 82 12/03/2019    HDL 65 12/03/2019    LDLCALC 96 12/03/2019    LABVLDL 16 12/03/2019     TSH:    Lab Results   Component Value Date    TSH 1.400 12/03/2019     IRON:  No results found for: IRON  LIPASE:    Lab Results   Component Value Date    LIPASE 39 01/03/2020       ASSESSMENT AND PLAN:      Patient Active Problem List    Diagnosis Date Noted    CAP (community acquired pneumonia) 04/10/2015     Priority: High    Syncope 04/10/2015     Priority: High    TIA (transient ischemic attack) 06/01/2013     Priority: High    COPD exacerbation ([facility] 04/02/2021    Closed compression fracture of lumbar vertebra ([facility] 07/01/2017    Thoracic outlet syndrome     COPD (chronic obstructive pulmonary disease) ([facility] 02/02/2011    Anxiety 02/02/2011    CAD (coronary artery disease) 02/02/2011    Tobacco abuse 02/02/2011    Hypothyroidism 02/02/2011    GERD (gastroesophageal reflux disease) 02/02/2011    Dyslipidemia 02/02/2011     Impression:  1. Acute hypoxic respiratory failure  2. Acute exacerbation of COPD with current tobacco use  3. Probable right lower lobe pneumonia  4. History of coronary disease  5. Hypothyroidism  6. Hyperlipidemia  7. History of TIA    Plan:  Admit to telemetry floor  Home medication reviewed  Monitor heart rate, blood pressure, O2 saturation  O2 nasal cannula Lovenox 40 mg subcu daily  DuoNeb aerosols 4 times daily  Sputum and blood cultures  Azithromycin 500 mg daily  Robitussin-DM 10 cc every 4 hours as needed  General diet  Up ad merle.   O2 saturation on room air with activity daily    Ricci Grider D.O.  4/2/2021  5:52 PM

## 2021-04-02 NOTE — ED PROVIDER NOTES
Patient presents to the ED for shortness of breath. She is also had episodes of COPD symptoms for the past week. Symptoms consist of cough, congestion, body aches, chills, and subjective fevers. Patient states his dyspnea feels different from her previous episodes of shortness of breath. She did not take any breathing treatments prior to coming to the ED. Denies any actual chest pain. Reports chest tightness yesterday which is since resolved. No associated nausea, vomiting, or diarrhea. The cough that she has is productive of white sputum. She also complains of a headache which she rates 8 out of 10. It is located frontally. It was gradual in onset. Mild improvement with ibuprofen. Denies neck stiffness or pain. No change in vision. Shortness of breath is worse with exertion and somewhat improved with rest.  No sick contacts at home. She has not been tested for Covid. Has not received her vaccine for Covid. Review of Systems   Constitutional: Positive for chills, fatigue and fever. Negative for diaphoresis. HENT: Positive for congestion. Negative for rhinorrhea. Eyes: Negative for photophobia and visual disturbance. Respiratory: Positive for cough, chest tightness, shortness of breath and wheezing. Cardiovascular: Negative for chest pain and palpitations. Gastrointestinal: Negative for abdominal pain, blood in stool, diarrhea, nausea and vomiting. Genitourinary: Negative for decreased urine volume and difficulty urinating. Musculoskeletal: Positive for myalgias. Negative for arthralgias, neck pain and neck stiffness. Skin: Negative for color change and pallor. Neurological: Positive for headaches. Negative for dizziness, syncope, light-headedness and numbness. Hematological: Does not bruise/bleed easily. Psychiatric/Behavioral: Negative for confusion. All other systems reviewed and are negative. Physical Exam  Vitals signs and nursing note reviewed. lactic was 1.0. Patient had ambulated just a short distance to the bathroom which was only maybe 10 or 15 feet and desaturated to 87%. She was fatigued and short of breath. She is going be admitted for further treatment evaluation. ED Course as of Apr 02 1540 Fri Apr 02, 2021   1445 Patient resting in bed in no distress. States she does not feel any better. Discussed results of labs with her.    [MS]    Patient ambulated into the restroom. Upon arriving she was 87%. Placed on 2 L by nasal cannula. Discussed results of chest x-ray. Will start antibiotics. [MS]      ED Course User Index  [MS] Alen Ector, DO       --------------------------------------------- PAST HISTORY ---------------------------------------------  Past Medical History:  has a past medical history of AAA (abdominal aortic aneurysm) ([facility] Arthritis, Blood circulation, collateral, CAD (coronary artery disease), COPD (chronic obstructive pulmonary disease) ([facility] Emphysema with chronic bronchitis ([facility] GERD (gastroesophageal reflux disease), Hypothyroidism, MI (myocardial infarction) ([facility] Thoracic outlet syndrome, TIA (transient ischemic attack), and Unspecified cerebral artery occlusion with cerebral infarction. Past Surgical History:  has a past surgical history that includes Tonsillectomy and adenoidectomy; Uterine Suspension; Appendectomy; Ovary removal; and Tubal ligation (Bilateral). Social History:  reports that she has been smoking cigarettes. She has a 51.00 pack-year smoking history. She has never used smokeless tobacco. She reports previous alcohol use. She reports that she does not use drugs. Family History: family history includes Cancer in her father, mother, and another family member; Dementia in her mother; Diabetes in her mother; Heart Disease in her father and mother; Liver Disease in her father. The patients home medications have been reviewed.     Allergies: Penicillins, Tetanus toxoids, Ciprofloxacin, Effexor [venlafaxine], Betadine [povidone iodine], Codeine, Demerol, Sulfa antibiotics, and Tape [adhesive tape]    -------------------------------------------------- RESULTS -------------------------------------------------    LABS:  Results for orders placed or performed during the hospital encounter of 04/02/21   COVID-19, Rapid   Result Value Ref Range    SARS-CoV-2, NAAT Not Detected Not Detected   CBC Auto Differential   Result Value Ref Range    WBC 3.2 (L) 4.5 - 11.5 E9/L    RBC 4.33 3.50 - 5.50 E12/L    Hemoglobin 13.5 11.5 - 15.5 g/dL    Hematocrit 40.6 34.0 - 48.0 %    MCV 93.8 80.0 - 99.9 fL    MCH 31.2 26.0 - 35.0 pg    MCHC 33.3 32.0 - 34.5 %    RDW 12.9 11.5 - 15.0 fL    Platelets 411 841 - 756 E9/L    MPV 10.0 7.0 - 12.0 fL    Neutrophils % 59.2 43.0 - 80.0 %    Immature Granulocytes % 0.0 0.0 - 5.0 %    Lymphocytes % 26.9 20.0 - 42.0 %    Monocytes % 13.6 (H) 2.0 - 12.0 %    Eosinophils % 0.0 0.0 - 6.0 %    Basophils % 0.3 0.0 - 2.0 %    Neutrophils Absolute 1.87 1.80 - 7.30 E9/L    Immature Granulocytes # 0.00 E9/L    Lymphocytes Absolute 0.85 (L) 1.50 - 4.00 E9/L    Monocytes Absolute 0.43 0.10 - 0.95 E9/L    Eosinophils Absolute 0.00 (L) 0.05 - 0.50 E9/L    Basophils Absolute 0.01 0.00 - 0.20 Y4/T   Basic Metabolic Panel w/ Reflex to MG   Result Value Ref Range    Sodium 138 132 - 146 mmol/L    Potassium reflex Magnesium 3.4 (L) 3.5 - 5.0 mmol/L    Chloride 106 98 - 107 mmol/L    CO2 20 (L) 22 - 29 mmol/L    Anion Gap 12 7 - 16 mmol/L    Glucose 85 74 - 99 mg/dL    BUN 13 8 - 23 mg/dL    CREATININE 0.8 0.5 - 1.0 mg/dL    GFR Non-African American >60 >=60 mL/min/1.73    GFR African American >60     Calcium 7.7 (L) 8.6 - 10.2 mg/dL   Troponin   Result Value Ref Range    Troponin <0.01 0.00 - 0.03 ng/mL   Brain Natriuretic Peptide   Result Value Ref Range    Pro-BNP 96 0 - 125 pg/mL   Lactate, Sepsis   Result Value Ref Range    Lactic Acid, Sepsis 1.0 0.5 ---------------------------------  Consultations:  Time: 7847. Spoke with Dr. Saundra Gutierrez. Discussed case. He will admit the patient. This patient's ED course included: a personal history and physicial examination, re-evaluation prior to disposition, multiple bedside re-evaluations, IV medications, cardiac monitoring, continuous pulse oximetry and complex medical decision making and emergency management    This patient has remained hemodynamically stable during their ED course. Diagnosis:  1. Community acquired pneumonia of right lower lobe of lung    2. COPD exacerbation ([facility]        Disposition:  Patient's disposition: Admit to med/surg floor  Patient's condition is fair.            Eleonora Yoo DO  04/02/21 1547

## 2021-04-02 NOTE — ED NOTES
Patient to x-ray at this time.       [facility]  04/02/21 2660

## 2021-04-03 LAB
ALBUMIN SERPL-MCNC: 3.2 G/DL (ref 3.5–5.2)
ALP BLD-CCNC: 97 U/L (ref 35–104)
ALT SERPL-CCNC: 12 U/L (ref 0–32)
ANION GAP SERPL CALCULATED.3IONS-SCNC: 12 MMOL/L (ref 7–16)
AST SERPL-CCNC: 27 U/L (ref 0–31)
BASOPHILS ABSOLUTE: 0.01 E9/L (ref 0–0.2)
BASOPHILS RELATIVE PERCENT: 0.3 % (ref 0–2)
BILIRUB SERPL-MCNC: <0.2 MG/DL (ref 0–1.2)
BUN BLDV-MCNC: 12 MG/DL (ref 8–23)
CALCIUM SERPL-MCNC: 7.7 MG/DL (ref 8.6–10.2)
CHLORIDE BLD-SCNC: 107 MMOL/L (ref 98–107)
CHOLESTEROL, TOTAL: 122 MG/DL (ref 0–199)
CO2: 19 MMOL/L (ref 22–29)
CREAT SERPL-MCNC: 0.7 MG/DL (ref 0.5–1)
EOSINOPHILS ABSOLUTE: 0.01 E9/L (ref 0.05–0.5)
EOSINOPHILS RELATIVE PERCENT: 0.3 % (ref 0–6)
GFR AFRICAN AMERICAN: >60
GFR NON-AFRICAN AMERICAN: >60 ML/MIN/1.73
GLUCOSE BLD-MCNC: 93 MG/DL (ref 74–99)
HCT VFR BLD CALC: 35.7 % (ref 34–48)
HDLC SERPL-MCNC: 50 MG/DL
HEMOGLOBIN: 11.6 G/DL (ref 11.5–15.5)
IMMATURE GRANULOCYTES #: 0.01 E9/L
IMMATURE GRANULOCYTES %: 0.3 % (ref 0–5)
L. PNEUMOPHILA SEROGP 1 UR AG: NORMAL
LACTIC ACID: 0.9 MMOL/L (ref 0.5–2.2)
LDL CHOLESTEROL CALCULATED: 58 MG/DL (ref 0–99)
LYMPHOCYTES ABSOLUTE: 1.07 E9/L (ref 1.5–4)
LYMPHOCYTES RELATIVE PERCENT: 34.1 % (ref 20–42)
MAGNESIUM: 1.8 MG/DL (ref 1.6–2.6)
MCH RBC QN AUTO: 30.6 PG (ref 26–35)
MCHC RBC AUTO-ENTMCNC: 32.5 % (ref 32–34.5)
MCV RBC AUTO: 94.2 FL (ref 80–99.9)
MONOCYTES ABSOLUTE: 0.4 E9/L (ref 0.1–0.95)
MONOCYTES RELATIVE PERCENT: 12.7 % (ref 2–12)
NEUTROPHILS ABSOLUTE: 1.64 E9/L (ref 1.8–7.3)
NEUTROPHILS RELATIVE PERCENT: 52.3 % (ref 43–80)
PDW BLD-RTO: 13.2 FL (ref 11.5–15)
PLATELET # BLD: 202 E9/L (ref 130–450)
PMV BLD AUTO: 10.1 FL (ref 7–12)
POTASSIUM SERPL-SCNC: 4.2 MMOL/L (ref 3.5–5)
PROCALCITONIN: 0.04 NG/ML (ref 0–0.08)
RBC # BLD: 3.79 E12/L (ref 3.5–5.5)
SODIUM BLD-SCNC: 138 MMOL/L (ref 132–146)
STREP PNEUMONIAE ANTIGEN, URINE: NORMAL
TOTAL PROTEIN: 6.4 G/DL (ref 6.4–8.3)
TRIGL SERPL-MCNC: 69 MG/DL (ref 0–149)
TSH SERPL DL<=0.05 MIU/L-ACNC: 1.81 UIU/ML (ref 0.27–4.2)
VLDLC SERPL CALC-MCNC: 14 MG/DL
WBC # BLD: 3.1 E9/L (ref 4.5–11.5)

## 2021-04-03 PROCEDURE — 6370000000 HC RX 637 (ALT 250 FOR IP): Performed by: INTERNAL MEDICINE

## 2021-04-03 PROCEDURE — 80061 LIPID PANEL: CPT

## 2021-04-03 PROCEDURE — 80053 COMPREHEN METABOLIC PANEL: CPT

## 2021-04-03 PROCEDURE — 83735 ASSAY OF MAGNESIUM: CPT

## 2021-04-03 PROCEDURE — 1200000000 HC SEMI PRIVATE

## 2021-04-03 PROCEDURE — 94640 AIRWAY INHALATION TREATMENT: CPT

## 2021-04-03 PROCEDURE — 87449 NOS EACH ORGANISM AG IA: CPT

## 2021-04-03 PROCEDURE — 6360000002 HC RX W HCPCS: Performed by: INTERNAL MEDICINE

## 2021-04-03 PROCEDURE — 36415 COLL VENOUS BLD VENIPUNCTURE: CPT

## 2021-04-03 PROCEDURE — 84443 ASSAY THYROID STIM HORMONE: CPT

## 2021-04-03 PROCEDURE — 2700000000 HC OXYGEN THERAPY PER DAY

## 2021-04-03 PROCEDURE — 87070 CULTURE OTHR SPECIMN AEROBIC: CPT

## 2021-04-03 PROCEDURE — 83605 ASSAY OF LACTIC ACID: CPT

## 2021-04-03 PROCEDURE — 84145 PROCALCITONIN (PCT): CPT

## 2021-04-03 PROCEDURE — 85025 COMPLETE CBC W/AUTO DIFF WBC: CPT

## 2021-04-03 RX ORDER — BENZONATATE 100 MG/1
200 CAPSULE ORAL 3 TIMES DAILY
Status: DISCONTINUED | OUTPATIENT
Start: 2021-04-03 | End: 2021-04-13 | Stop reason: HOSPADM

## 2021-04-03 RX ORDER — METHYLPREDNISOLONE SODIUM SUCCINATE 40 MG/ML
40 INJECTION, POWDER, LYOPHILIZED, FOR SOLUTION INTRAMUSCULAR; INTRAVENOUS EVERY 8 HOURS
Status: DISCONTINUED | OUTPATIENT
Start: 2021-04-03 | End: 2021-04-04

## 2021-04-03 RX ADMIN — IPRATROPIUM BROMIDE AND ALBUTEROL SULFATE 1 AMPULE: .5; 3 SOLUTION RESPIRATORY (INHALATION) at 09:11

## 2021-04-03 RX ADMIN — METHYLPREDNISOLONE SODIUM SUCCINATE 40 MG: 40 INJECTION, POWDER, FOR SOLUTION INTRAMUSCULAR; INTRAVENOUS at 20:54

## 2021-04-03 RX ADMIN — ASPIRIN 81 MG: 81 TABLET, FILM COATED ORAL at 08:51

## 2021-04-03 RX ADMIN — POTASSIUM CHLORIDE AND SODIUM CHLORIDE: 900; 150 INJECTION, SOLUTION INTRAVENOUS at 20:55

## 2021-04-03 RX ADMIN — BENZONATATE 200 MG: 100 CAPSULE ORAL at 20:55

## 2021-04-03 RX ADMIN — POTASSIUM CHLORIDE 20 MEQ: 20 TABLET, EXTENDED RELEASE ORAL at 08:51

## 2021-04-03 RX ADMIN — IPRATROPIUM BROMIDE AND ALBUTEROL SULFATE 1 AMPULE: .5; 3 SOLUTION RESPIRATORY (INHALATION) at 13:13

## 2021-04-03 RX ADMIN — METHYLPREDNISOLONE SODIUM SUCCINATE 40 MG: 40 INJECTION, POWDER, FOR SOLUTION INTRAMUSCULAR; INTRAVENOUS at 12:25

## 2021-04-03 RX ADMIN — IPRATROPIUM BROMIDE AND ALBUTEROL SULFATE 1 AMPULE: .5; 3 SOLUTION RESPIRATORY (INHALATION) at 18:28

## 2021-04-03 RX ADMIN — AZITHROMYCIN MONOHYDRATE 500 MG: 250 TABLET ORAL at 08:51

## 2021-04-03 RX ADMIN — POTASSIUM CHLORIDE 20 MEQ: 20 TABLET, EXTENDED RELEASE ORAL at 18:26

## 2021-04-03 RX ADMIN — BUDESONIDE 500 MCG: 0.5 INHALANT RESPIRATORY (INHALATION) at 18:28

## 2021-04-03 RX ADMIN — BENZONATATE 200 MG: 100 CAPSULE ORAL at 12:25

## 2021-04-03 RX ADMIN — POTASSIUM CHLORIDE AND SODIUM CHLORIDE: 900; 150 INJECTION, SOLUTION INTRAVENOUS at 09:15

## 2021-04-03 RX ADMIN — ACETAMINOPHEN 500 MG: 500 TABLET ORAL at 09:37

## 2021-04-03 RX ADMIN — BUDESONIDE 500 MCG: 0.5 INHALANT RESPIRATORY (INHALATION) at 06:00

## 2021-04-03 RX ADMIN — IPRATROPIUM BROMIDE AND ALBUTEROL SULFATE 1 AMPULE: .5; 3 SOLUTION RESPIRATORY (INHALATION) at 06:02

## 2021-04-03 RX ADMIN — ACETAMINOPHEN 500 MG: 500 TABLET ORAL at 20:59

## 2021-04-03 ASSESSMENT — PAIN DESCRIPTION - ONSET: ONSET: SUDDEN

## 2021-04-03 ASSESSMENT — PAIN SCALES - GENERAL
PAINLEVEL_OUTOF10: 0
PAINLEVEL_OUTOF10: 4
PAINLEVEL_OUTOF10: 2
PAINLEVEL_OUTOF10: 4

## 2021-04-03 ASSESSMENT — PAIN DESCRIPTION - LOCATION: LOCATION: ARM

## 2021-04-03 ASSESSMENT — PAIN - FUNCTIONAL ASSESSMENT: PAIN_FUNCTIONAL_ASSESSMENT: ACTIVITIES ARE NOT PREVENTED

## 2021-04-03 ASSESSMENT — PAIN DESCRIPTION - ORIENTATION: ORIENTATION: RIGHT;UPPER

## 2021-04-03 ASSESSMENT — PAIN DESCRIPTION - DESCRIPTORS: DESCRIPTORS: ACHING;DISCOMFORT;BURNING

## 2021-04-03 ASSESSMENT — PAIN DESCRIPTION - PAIN TYPE: TYPE: ACUTE PAIN

## 2021-04-03 ASSESSMENT — PAIN DESCRIPTION - FREQUENCY: FREQUENCY: INTERMITTENT

## 2021-04-03 ASSESSMENT — PAIN DESCRIPTION - PROGRESSION: CLINICAL_PROGRESSION: GRADUALLY IMPROVING

## 2021-04-03 NOTE — PLAN OF CARE
Problem: Falls - Risk of:  Goal: Will remain free from falls  Description: Will remain free from falls  4/3/2021 0637 by Charlene Hardin RN  Outcome: Met This Shift     Problem: Falls - Risk of:  Goal: Absence of physical injury  Description: Absence of physical injury  4/3/2021 0637 by Charlene Hardin RN  Outcome: Met This Shift

## 2021-04-03 NOTE — PLAN OF CARE
Problem: Falls - Risk of:  Goal: Will remain free from falls  Description: Will remain free from falls  4/2/2021 2135 by Fátima Lee RN  Outcome: Met This Shift     Problem: Falls - Risk of:  Goal: Absence of physical injury  Description: Absence of physical injury  4/2/2021 2135 by Fátima Lee RN  Outcome: Met This Shift

## 2021-04-03 NOTE — CARE COORDINATION
SOCIAL WORK / DISCHARGE PLANNING:  COVID neg 4/2. SW consult noted for advance directives. Sw met with pt at bedside. SW provided and explained Advanced Directives packet to pt, pt to review and complete at a later time, aware of need for notrarized signature or 2 unrelated witnesses. Pt resides with son, 1 story home, 2 [address]. Independent, ambulates without device. Denies hx HHC / SNF. Currently wearing O2 but no home O2. Dr Machelle Collins PCP/ [address] [place] [address].                  Electronically signed by EMMANUEL Berry on 4/3/2021 at 4:10 PM

## 2021-04-03 NOTE — PROGRESS NOTES
Department of Internal Medicine        CHIEF COMPLAINT: Shortness of breath, fever/chills body aches and nonproductive cough. Reason for Admission:   Required pneumonia right lower lobe    HISTORY OF PRESENT ILLNESS:      The patient is a 73 y.o. female who presents with increased shortness of breath, fever/chills, scant productive cough and generalized body aches. Patient denied chest pain today but did have some chest tightness yesterday that resolved. There is no associated nausea/vomiting or diarrhea. Patient has a scant productive white sputum off and on. Patient also complains of cephalgia. Patient's had increased shortness of breath with activity. Symptoms started about 5 days ago. Patient states she has not received her Covid vaccine with the COVID-19 antigen being negative. WBC is 3.2 with potassium 3.4. Temperature is normal 98.1 with a heart rate of 100 blood pressure 117/56. O2 sat was 95% on 2 L. Patient was ambulated short distance in the ED and the O2 sat did go down as low as 87% on room air. Chest x-ray show pronounced emphysema along with right greater than left base atelectasis or infiltrates. Patient was mated the hospital because of acute hypoxic respiratory failure and possibly underlying viral versus bacterial pneumonia in right lung. 4/3/2021  Patient seen examined on telemetry floor. Patient feels little bit better today. Patient still has a harsh nonproductive cough. Lung sounds still have some mild expiratory wheezing and coarse breath sounds. Patient still short of breath with activity. BUN/creatinine is 12/0.7 with potassium improved to 4.2. WBC still low at 3.1 with hemoglobin 1.6. Temperature ranges 98.699.1 with heart rate 94. Blood pressure ranges 90/50 5113/55. O2 sat 94% on 2 L nasal cannula at rest.  CT the chest showed extensive emphysematous changes with scarring or atelectasis in the bases.   With mild leukopenia and a CAT scan showing no definitive pneumonia the patient possibly has a has a viral bronchitis/pneumonitis versus bacterial bronchitis. Past Medical History:    Past Medical History:   Diagnosis Date    AAA (abdominal aortic aneurysm) ([facility] 2014    Arthritis     Blood circulation, collateral     thoracic outlet syndrome    CAD (coronary artery disease)     mi    COPD (chronic obstructive pulmonary disease) (HCC)     Emphysema with chronic bronchitis (HCC)     GERD (gastroesophageal reflux disease)     Hypothyroidism     MI (myocardial infarction) ([facility]     Substance abuse ([facility]     tobacco    Thoracic outlet syndrome     TIA (transient ischemic attack)     Unspecified cerebral artery occlusion with cerebral infarction      Past Surgical History:    Past Surgical History:   Procedure Laterality Date    APPENDECTOMY      OVARY REMOVAL      also tube on right removed    TONSILLECTOMY AND ADENOIDECTOMY      TUBAL LIGATION Bilateral     UTERINE SUSPENSION         Medications Prior to Admission:    @  Prior to Admission medications    Medication Sig Start Date End Date Taking? Authorizing Provider   Calcium Carbonate Antacid (TUMS ULTRA 1000) 1000 MG CHEW Take 1,000 mg by mouth 3 times daily as needed (Reflux)   Yes Historical Provider, MD   ibuprofen (ADVIL;MOTRIN) 200 MG tablet Take 400 mg by mouth every 6 hours as needed for Pain   Yes Historical Provider, MD   Cholecalciferol (VITAMIN D3) 50 MCG (2000 UT) CAPS Take 2,000 Units by mouth daily   Yes Historical Provider, MD   aspirin 81 MG EC tablet Take 1 tablet by mouth daily 12/5/19  Yes Felicia Smith, DO       Allergies:  Penicillins, Tetanus toxoids, Ciprofloxacin, Effexor [venlafaxine], Kiwi extract, Betadine [povidone iodine], Codeine, Demerol, Sulfa antibiotics, and Tape Mary Neas tape]    Social History:   Social History     Socioeconomic History    Marital status:       Spouse name: Not on file    Number of children: Not on file    Years of education: Not on file  Highest education level: Not on file   Occupational History    Not on file   Social Needs    Financial resource strain: Not on file    Food insecurity     Worry: Not on file     Inability: Not on file    Transportation needs     Medical: Not on file     Non-medical: Not on file   Tobacco Use    Smoking status: Current Some Day Smoker     Packs/day: 1.00     Years: 51.00     Pack years: 51.00     Types: Cigarettes    Smokeless tobacco: Never Used    Tobacco comment: IN THE PROCESS OF QUITTING   Substance and Sexual Activity    Alcohol use: Not Currently     Comment: occasional    Drug use: No    Sexual activity: Not on file   Lifestyle    Physical activity     Days per week: Not on file     Minutes per session: Not on file    Stress: Not on file   Relationships    Social connections     Talks on phone: Not on file     Gets together: Not on file     Attends Hindu service: Not on file     Active member of club or organization: Not on file     Attends meetings of clubs or organizations: Not on file     Relationship status: Not on file    Intimate partner violence     Fear of current or ex partner: Not on file     Emotionally abused: Not on file     Physically abused: Not on file     Forced sexual activity: Not on file   Other Topics Concern    Not on file   Social History Narrative    Not on file       Family History:   Family History   Problem Relation Age of Onset    Liver Disease Father     Cancer Father     Heart Disease Father     Cancer Other         breast    Cancer Mother         breast    Heart Disease Mother     Dementia Mother     Diabetes Mother        REVIEW OF SYSTEMS:    Gen: Patient denies any lightheadedness or dizziness. No LOC or syncope. No fevers or chills. HEENT: No earache, sore throat or nasal congestion. Resp: +cough with scant white productive sputum, no hemoptysis   Cardiac: Denies chest pain, SOB, diaphoresis or palpitations.     GI: No nausea, vomiting, diarrhea or constipation. No melena or hematochezia. : No urinary complaints, dysuria, hematuria or frequency. MSK: No extremity weakness, paralysis or paresthesias. PHYSICAL EXAM:    Vitals:  BP (!) 90/55   Pulse 94   Temp 99.1 °F (37.3 °C) (Oral)   Resp 18   Ht 5' 6\" (1.676 m)   Wt 148 lb (67.1 kg)   SpO2 94%   BMI 23.89 kg/m²     General:  This is a 73 y.o. yo female who is alert and oriented in NAD  HEENT:  Head is normocephalic and atraumatic, PERRLA, EOMI, mucus membranes moist with no pharyngeal erythema or exudate. Neck:  Supple with no carotid bruits, JVD or thyromegaly.   No cervical adenopathy  CV:  Regular rate and rhythm, no murmurs  Lungs: Coarse decreased breath sounds to auscultation bilaterally with mild scattered right lung crackles with no wheezes, rhonchi  Abdomen:  Soft, nontender, nondistended, bowel sounds present  Extremities:  No edema, peripheral pulses intact bilaterally  Neuro:  Cranial nerves II-XII grossly intact; motor and sensory function intact with no focal deficits  Skin:  No rashes, lesions or wounds    DATA:  CBC with Differential:    Lab Results   Component Value Date    WBC 3.1 04/03/2021    RBC 3.79 04/03/2021    HGB 11.6 04/03/2021    HCT 35.7 04/03/2021     04/03/2021    MCV 94.2 04/03/2021    MCH 30.6 04/03/2021    MCHC 32.5 04/03/2021    RDW 13.2 04/03/2021    LYMPHOPCT 34.1 04/03/2021    MONOPCT 12.7 04/03/2021    BASOPCT 0.3 04/03/2021    MONOSABS 0.40 04/03/2021    LYMPHSABS 1.07 04/03/2021    EOSABS 0.01 04/03/2021    BASOSABS 0.01 04/03/2021     CMP:    Lab Results   Component Value Date     04/03/2021    K 4.2 04/03/2021    K 3.4 04/02/2021     04/03/2021    CO2 19 04/03/2021    BUN 12 04/03/2021    CREATININE 0.7 04/03/2021    GFRAA >60 04/03/2021    LABGLOM >60 04/03/2021    GLUCOSE 93 04/03/2021    GLUCOSE 83 02/03/2011    PROT 6.4 04/03/2021    LABALBU 3.2 04/03/2021    LABALBU 4.1 02/03/2011    CALCIUM 7.7 04/03/2021 BILITOT <0.2 04/03/2021    ALKPHOS 97 04/03/2021    AST 27 04/03/2021    ALT 12 04/03/2021     Magnesium:    Lab Results   Component Value Date    MG 1.8 04/03/2021     Phosphorus:    Lab Results   Component Value Date    PHOS 4.0 07/02/2017     PT/INR:    Lab Results   Component Value Date    PROTIME 12.1 04/02/2021    INR 1.0 04/02/2021     Troponin:    Lab Results   Component Value Date    TROPONINI <0.01 04/02/2021     U/A:    Lab Results   Component Value Date    COLORU Straw 12/03/2019    PROTEINU Negative 12/03/2019    PHUR 7.0 12/03/2019    WBCUA 0-1 12/03/2019    RBCUA NONE 12/03/2019    BACTERIA NONE 12/03/2019    CLARITYU Clear 12/03/2019    SPECGRAV 1.010 12/03/2019    LEUKOCYTESUR TRACE 12/03/2019    UROBILINOGEN 0.2 12/03/2019    BILIRUBINUR Negative 12/03/2019    BLOODU TRACE 12/03/2019    GLUCOSEU Negative 12/03/2019     ABG:  No results found for: PH, PCO2, PO2, HCO3, BE, THGB, TCO2, O2SAT  HgBA1c:    Lab Results   Component Value Date    LABA1C 5.2 07/02/2017     FLP:    Lab Results   Component Value Date    TRIG 69 04/03/2021    HDL 50 04/03/2021    LDLCALC 58 04/03/2021    LABVLDL 14 04/03/2021     TSH:    Lab Results   Component Value Date    TSH 1.810 04/03/2021     IRON:  No results found for: IRON  LIPASE:    Lab Results   Component Value Date    LIPASE 39 01/03/2020       ASSESSMENT AND PLAN:      Patient Active Problem List    Diagnosis Date Noted    CAP (community acquired pneumonia) 04/10/2015     Priority: High    Syncope 04/10/2015     Priority: High    TIA (transient ischemic attack) 06/01/2013     Priority: High    COPD exacerbation ([facility] 04/02/2021    Closed compression fracture of lumbar vertebra ([facility] 07/01/2017    Thoracic outlet syndrome     COPD (chronic obstructive pulmonary disease) ([facility] 02/02/2011    Anxiety 02/02/2011    CAD (coronary artery disease) 02/02/2011    Tobacco abuse 02/02/2011    Hypothyroidism 02/02/2011    GERD (gastroesophageal reflux disease) 02/02/2011    Dyslipidemia 02/02/2011     Impression:  1. Acute hypoxic respiratory failure  2. Acute exacerbation of COPD with current tobacco use  3. Possible right lower lobe pneumonia  4. History of coronary disease  5. Hypothyroidism  6. Hyperlipidemia  7. History of TIA    Plan:  Admit to telemetry floor  Home medication reviewed  Monitor heart rate, blood pressure, O2 saturation  O2 nasal cannula Lovenox 40 mg subcu daily  DuoNeb aerosols 4 times daily  Sputum and blood cultures  Azithromycin 500 mg daily  Robitussin-DM 10 cc every 4 hours as needed  General diet  Up ad merle.     O2 saturation on room air with activity daily  Solu-Medrol 40 mg IV push every 8 hours  Tessalon Perles 200 mg p.o. 3 times daily    Rayna Thomason D.O.  4/3/2021  11:56 AM

## 2021-04-03 NOTE — PROGRESS NOTES
Pt was 95 % Ra at rest. Patient ambulated 200 feet and 02 went to 87 % RA.  2L 02 applied and oxygen was 92 % on 2L while ambulating

## 2021-04-03 NOTE — PLAN OF CARE
Problem: Pain:  Goal: Control of acute pain  Description: Control of acute pain  Outcome: Met This Shift     Problem: Falls - Risk of:  Goal: Will remain free from falls  Description: Will remain free from falls  4/3/2021 1148 by Elise Horton RN  Outcome: Met This Shift     Problem: Skin Integrity:  Goal: Will show no infection signs and symptoms  Description: Will show no infection signs and symptoms  Outcome: Met This Shift

## 2021-04-04 PROCEDURE — 6360000002 HC RX W HCPCS: Performed by: INTERNAL MEDICINE

## 2021-04-04 PROCEDURE — 2700000000 HC OXYGEN THERAPY PER DAY

## 2021-04-04 PROCEDURE — 1200000000 HC SEMI PRIVATE

## 2021-04-04 PROCEDURE — 94640 AIRWAY INHALATION TREATMENT: CPT

## 2021-04-04 PROCEDURE — 6370000000 HC RX 637 (ALT 250 FOR IP): Performed by: INTERNAL MEDICINE

## 2021-04-04 RX ORDER — PREDNISONE 20 MG/1
40 TABLET ORAL DAILY
Status: DISCONTINUED | OUTPATIENT
Start: 2021-04-05 | End: 2021-04-07

## 2021-04-04 RX ORDER — METHYLPREDNISOLONE SODIUM SUCCINATE 40 MG/ML
40 INJECTION, POWDER, LYOPHILIZED, FOR SOLUTION INTRAMUSCULAR; INTRAVENOUS EVERY 8 HOURS
Status: COMPLETED | OUTPATIENT
Start: 2021-04-04 | End: 2021-04-05

## 2021-04-04 RX ADMIN — BUDESONIDE 500 MCG: 0.5 INHALANT RESPIRATORY (INHALATION) at 06:19

## 2021-04-04 RX ADMIN — BUDESONIDE 500 MCG: 0.5 INHALANT RESPIRATORY (INHALATION) at 18:13

## 2021-04-04 RX ADMIN — BENZONATATE 200 MG: 100 CAPSULE ORAL at 20:30

## 2021-04-04 RX ADMIN — POTASSIUM CHLORIDE 20 MEQ: 20 TABLET, EXTENDED RELEASE ORAL at 17:44

## 2021-04-04 RX ADMIN — POTASSIUM CHLORIDE 20 MEQ: 20 TABLET, EXTENDED RELEASE ORAL at 07:54

## 2021-04-04 RX ADMIN — IPRATROPIUM BROMIDE AND ALBUTEROL SULFATE 1 AMPULE: .5; 3 SOLUTION RESPIRATORY (INHALATION) at 09:50

## 2021-04-04 RX ADMIN — AZITHROMYCIN MONOHYDRATE 500 MG: 250 TABLET ORAL at 07:54

## 2021-04-04 RX ADMIN — METHYLPREDNISOLONE SODIUM SUCCINATE 40 MG: 40 INJECTION, POWDER, FOR SOLUTION INTRAMUSCULAR; INTRAVENOUS at 12:16

## 2021-04-04 RX ADMIN — POTASSIUM CHLORIDE AND SODIUM CHLORIDE: 900; 150 INJECTION, SOLUTION INTRAVENOUS at 11:16

## 2021-04-04 RX ADMIN — BENZONATATE 200 MG: 100 CAPSULE ORAL at 13:13

## 2021-04-04 RX ADMIN — METHYLPREDNISOLONE SODIUM SUCCINATE 40 MG: 40 INJECTION, POWDER, FOR SOLUTION INTRAMUSCULAR; INTRAVENOUS at 20:30

## 2021-04-04 RX ADMIN — IBUPROFEN 800 MG: 800 TABLET, FILM COATED ORAL at 20:36

## 2021-04-04 RX ADMIN — IPRATROPIUM BROMIDE AND ALBUTEROL SULFATE 1 AMPULE: .5; 3 SOLUTION RESPIRATORY (INHALATION) at 06:19

## 2021-04-04 RX ADMIN — ASPIRIN 81 MG: 81 TABLET, FILM COATED ORAL at 07:54

## 2021-04-04 RX ADMIN — IPRATROPIUM BROMIDE AND ALBUTEROL SULFATE 1 AMPULE: .5; 3 SOLUTION RESPIRATORY (INHALATION) at 13:34

## 2021-04-04 RX ADMIN — BENZONATATE 200 MG: 100 CAPSULE ORAL at 07:54

## 2021-04-04 RX ADMIN — IPRATROPIUM BROMIDE AND ALBUTEROL SULFATE 1 AMPULE: .5; 3 SOLUTION RESPIRATORY (INHALATION) at 18:13

## 2021-04-04 RX ADMIN — ENOXAPARIN SODIUM 40 MG: 40 INJECTION SUBCUTANEOUS at 17:44

## 2021-04-04 RX ADMIN — METHYLPREDNISOLONE SODIUM SUCCINATE 40 MG: 40 INJECTION, POWDER, FOR SOLUTION INTRAMUSCULAR; INTRAVENOUS at 04:49

## 2021-04-04 RX ADMIN — POTASSIUM CHLORIDE AND SODIUM CHLORIDE: 900; 150 INJECTION, SOLUTION INTRAVENOUS at 23:12

## 2021-04-04 ASSESSMENT — PAIN DESCRIPTION - ORIENTATION: ORIENTATION: MID

## 2021-04-04 ASSESSMENT — PAIN SCALES - GENERAL: PAINLEVEL_OUTOF10: 0

## 2021-04-04 ASSESSMENT — PAIN DESCRIPTION - PAIN TYPE: TYPE: ACUTE PAIN

## 2021-04-04 NOTE — PROGRESS NOTES
MD   aspirin 81 MG EC tablet Take 1 tablet by mouth daily 12/5/19  Yes Chelita Cardenas DO       Allergies:  Penicillins, Tetanus toxoids, Ciprofloxacin, Effexor [venlafaxine], Kiwi extract, Betadine [povidone iodine], Codeine, Demerol, Sulfa antibiotics, and Tape Illa Junes tape]    Social History:   Social History     Socioeconomic History    Marital status:       Spouse name: Not on file    Number of children: Not on file    Years of education: Not on file    Highest education level: Not on file   Occupational History    Not on file   Social Needs    Financial resource strain: Not on file    Food insecurity     Worry: Not on file     Inability: Not on file    Transportation needs     Medical: Not on file     Non-medical: Not on file   Tobacco Use    Smoking status: Current Some Day Smoker     Packs/day: 1.00     Years: 51.00     Pack years: 51.00     Types: Cigarettes    Smokeless tobacco: Never Used    Tobacco comment: IN THE PROCESS OF QUITTING   Substance and Sexual Activity    Alcohol use: Not Currently     Comment: occasional    Drug use: No    Sexual activity: Not on file   Lifestyle    Physical activity     Days per week: Not on file     Minutes per session: Not on file    Stress: Not on file   Relationships    Social connections     Talks on phone: Not on file     Gets together: Not on file     Attends Anabaptist service: Not on file     Active member of club or organization: Not on file     Attends meetings of clubs or organizations: Not on file     Relationship status: Not on file    Intimate partner violence     Fear of current or ex partner: Not on file     Emotionally abused: Not on file     Physically abused: Not on file     Forced sexual activity: Not on file   Other Topics Concern    Not on file   Social History Narrative    Not on file       Family History:   Family History   Problem Relation Age of Onset    Liver Disease Father     Cancer Father     Heart Disease Father     Cancer Other         breast    Cancer Mother         breast    Heart Disease Mother     Dementia Mother     Diabetes Mother        REVIEW OF SYSTEMS:    Gen: Patient denies any lightheadedness or dizziness. No LOC or syncope. No fevers or chills. HEENT: No earache, sore throat or nasal congestion. Resp: +cough with scant white productive sputum, no hemoptysis   Cardiac: Denies chest pain, SOB, diaphoresis or palpitations. GI: No nausea, vomiting, diarrhea or constipation. No melena or hematochezia. : No urinary complaints, dysuria, hematuria or frequency. MSK: No extremity weakness, paralysis or paresthesias. PHYSICAL EXAM:    Vitals:  /70   Pulse 102   Temp 97.8 °F (36.6 °C) (Oral)   Resp 22   Ht 5' 6\" (1.676 m)   Wt 148 lb (67.1 kg)   SpO2 91%   BMI 23.89 kg/m²     General:  This is a 73 y.o. yo female who is alert and oriented in NAD  HEENT:  Head is normocephalic and atraumatic, PERRLA, EOMI, mucus membranes moist with no pharyngeal erythema or exudate. Neck:  Supple with no carotid bruits, JVD or thyromegaly.   No cervical adenopathy  CV:  Regular rate and rhythm, no murmurs  Lungs: Coarse decreased breath sounds to auscultation bilaterally with mild scattered right lung crackles with no wheezes, rhonchi  Abdomen:  Soft, nontender, nondistended, bowel sounds present  Extremities:  No edema, peripheral pulses intact bilaterally  Neuro:  Cranial nerves II-XII grossly intact; motor and sensory function intact with no focal deficits  Skin:  No rashes, lesions or wounds    DATA:  CBC with Differential:    Lab Results   Component Value Date    WBC 3.1 04/03/2021    RBC 3.79 04/03/2021    HGB 11.6 04/03/2021    HCT 35.7 04/03/2021     04/03/2021    MCV 94.2 04/03/2021    MCH 30.6 04/03/2021    MCHC 32.5 04/03/2021    RDW 13.2 04/03/2021    LYMPHOPCT 34.1 04/03/2021    MONOPCT 12.7 04/03/2021    BASOPCT 0.3 04/03/2021    MONOSABS 0.40 04/03/2021    LYMPHSABS 1.07 04/03/2021    EOSABS 0.01 04/03/2021    BASOSABS 0.01 04/03/2021     CMP:    Lab Results   Component Value Date     04/03/2021    K 4.2 04/03/2021    K 3.4 04/02/2021     04/03/2021    CO2 19 04/03/2021    BUN 12 04/03/2021    CREATININE 0.7 04/03/2021    GFRAA >60 04/03/2021    LABGLOM >60 04/03/2021    GLUCOSE 93 04/03/2021    GLUCOSE 83 02/03/2011    PROT 6.4 04/03/2021    LABALBU 3.2 04/03/2021    LABALBU 4.1 02/03/2011    CALCIUM 7.7 04/03/2021    BILITOT <0.2 04/03/2021    ALKPHOS 97 04/03/2021    AST 27 04/03/2021    ALT 12 04/03/2021     Magnesium:    Lab Results   Component Value Date    MG 1.8 04/03/2021     Phosphorus:    Lab Results   Component Value Date    PHOS 4.0 07/02/2017     PT/INR:    Lab Results   Component Value Date    PROTIME 12.1 04/02/2021    INR 1.0 04/02/2021     Troponin:    Lab Results   Component Value Date    TROPONINI <0.01 04/02/2021     U/A:    Lab Results   Component Value Date    COLORU Straw 12/03/2019    PROTEINU Negative 12/03/2019    PHUR 7.0 12/03/2019    WBCUA 0-1 12/03/2019    RBCUA NONE 12/03/2019    BACTERIA NONE 12/03/2019    CLARITYU Clear 12/03/2019    SPECGRAV 1.010 12/03/2019    LEUKOCYTESUR TRACE 12/03/2019    UROBILINOGEN 0.2 12/03/2019    BILIRUBINUR Negative 12/03/2019    BLOODU TRACE 12/03/2019    GLUCOSEU Negative 12/03/2019     ABG:  No results found for: PH, PCO2, PO2, HCO3, BE, THGB, TCO2, O2SAT  HgBA1c:    Lab Results   Component Value Date    LABA1C 5.2 07/02/2017     FLP:    Lab Results   Component Value Date    TRIG 69 04/03/2021    HDL 50 04/03/2021    LDLCALC 58 04/03/2021    LABVLDL 14 04/03/2021     TSH:    Lab Results   Component Value Date    TSH 1.810 04/03/2021     IRON:  No results found for: IRON  LIPASE:    Lab Results   Component Value Date    LIPASE 39 01/03/2020       ASSESSMENT AND PLAN:      Patient Active Problem List    Diagnosis Date Noted    CAP (community acquired pneumonia) 04/10/2015     Priority: High    Syncope 04/10/2015     Priority: High    TIA (transient ischemic attack) 06/01/2013     Priority: High    [email or web address] 04/02/2021    Closed compression fracture of lumbar vertebra ([facility] 07/01/2017    Thoracic outlet syndrome     COPD (chronic obstructive pulmonary disease) ([facility] 02/02/2011    Anxiety 02/02/2011    CAD (coronary artery disease) 02/02/2011    Tobacco abuse 02/02/2011    Hypothyroidism 02/02/2011    GERD (gastroesophageal reflux disease) 02/02/2011    Dyslipidemia 02/02/2011     Impression:  1. Acute hypoxic respiratory failure  2. Acute exacerbation of COPD with current tobacco use  3. Possible right lower lobe pneumonia  4. History of coronary disease  5. Hypothyroidism  6. Hyperlipidemia  7. History of TIA    Plan:  Admit to telemetry floor  Home medication reviewed  Monitor heart rate, blood pressure, O2 saturation  O2 nasal cannula Lovenox 40 mg subcu daily  DuoNeb aerosols 4 times daily  Sputum and blood cultures  Azithromycin 500 mg daily  Robitussin-DM 10 cc every 4 hours as needed  General diet  Up ad merle.     O2 saturation on room air with activity daily  Stop Solu-Medrol 40 mg IV push every 8 hours and start oral prednisone tomorrow morning  Tessalon Perles 200 mg p.o. 3 times daily    Stop IV fluids    Diego Mckeon D.O.  4/4/2021  11:30 AM

## 2021-04-04 NOTE — PROGRESS NOTES
Patient stated was having some sudden right arm pain in one area that did not radiate anywhere. Stated was a \"burning\" sensation and denied any numbness. Patient denied chest pain. Charge nurse was notified. Will continue to monitor the patient.     Electronically signed by Yeni Maravilla RN on 4/3/2021 at 10:10 PM

## 2021-04-04 NOTE — PLAN OF CARE
Problem: Pain:  Goal: Pain level will decrease  Description: Pain level will decrease  4/4/2021 0643 by Sandrita Redding RN  Outcome: Met This Shift     Problem: Pain:  Goal: Control of acute pain  Description: Control of acute pain  4/4/2021 1134 by Carlos Drake RN  Outcome: Met This Shift     Problem: Falls - Risk of:  Goal: Will remain free from falls  Description: Will remain free from falls  4/4/2021 1134 by Carlos Drake RN  Outcome: Met This Shift

## 2021-04-05 LAB
ANION GAP SERPL CALCULATED.3IONS-SCNC: 7 MMOL/L (ref 7–16)
BASOPHILS ABSOLUTE: 0 E9/L (ref 0–0.2)
BASOPHILS RELATIVE PERCENT: 0 % (ref 0–2)
BUN BLDV-MCNC: 13 MG/DL (ref 8–23)
CALCIUM SERPL-MCNC: 8.5 MG/DL (ref 8.6–10.2)
CHLORIDE BLD-SCNC: 107 MMOL/L (ref 98–107)
CO2: 23 MMOL/L (ref 22–29)
CREAT SERPL-MCNC: 0.6 MG/DL (ref 0.5–1)
EOSINOPHILS ABSOLUTE: 0 E9/L (ref 0.05–0.5)
EOSINOPHILS RELATIVE PERCENT: 0 % (ref 0–6)
GFR AFRICAN AMERICAN: >60
GFR NON-AFRICAN AMERICAN: >60 ML/MIN/1.73
GLUCOSE BLD-MCNC: 126 MG/DL (ref 74–99)
HCT VFR BLD CALC: 37.4 % (ref 34–48)
HEMOGLOBIN: 12.4 G/DL (ref 11.5–15.5)
LYMPHOCYTES ABSOLUTE: 0.36 E9/L (ref 1.5–4)
LYMPHOCYTES RELATIVE PERCENT: 4.3 % (ref 20–42)
MCH RBC QN AUTO: 31.2 PG (ref 26–35)
MCHC RBC AUTO-ENTMCNC: 33.2 % (ref 32–34.5)
MCV RBC AUTO: 94.2 FL (ref 80–99.9)
MONOCYTES ABSOLUTE: 0.27 E9/L (ref 0.1–0.95)
MONOCYTES RELATIVE PERCENT: 2.6 % (ref 2–12)
NEUTROPHILS ABSOLUTE: 8.46 E9/L (ref 1.8–7.3)
NEUTROPHILS RELATIVE PERCENT: 93 % (ref 43–80)
OVALOCYTES: ABNORMAL
PDW BLD-RTO: 13.2 FL (ref 11.5–15)
PLATELET # BLD: 259 E9/L (ref 130–450)
PMV BLD AUTO: 10 FL (ref 7–12)
POIKILOCYTES: ABNORMAL
POLYCHROMASIA: ABNORMAL
POTASSIUM SERPL-SCNC: 5.3 MMOL/L (ref 3.5–5)
RBC # BLD: 3.97 E12/L (ref 3.5–5.5)
SCHISTOCYTES: ABNORMAL
SODIUM BLD-SCNC: 137 MMOL/L (ref 132–146)
WBC # BLD: 9.1 E9/L (ref 4.5–11.5)

## 2021-04-05 PROCEDURE — 36415 COLL VENOUS BLD VENIPUNCTURE: CPT

## 2021-04-05 PROCEDURE — 85025 COMPLETE CBC W/AUTO DIFF WBC: CPT

## 2021-04-05 PROCEDURE — 6360000002 HC RX W HCPCS: Performed by: INTERNAL MEDICINE

## 2021-04-05 PROCEDURE — 6370000000 HC RX 637 (ALT 250 FOR IP): Performed by: INTERNAL MEDICINE

## 2021-04-05 PROCEDURE — 1200000000 HC SEMI PRIVATE

## 2021-04-05 PROCEDURE — 97530 THERAPEUTIC ACTIVITIES: CPT

## 2021-04-05 PROCEDURE — 97161 PT EVAL LOW COMPLEX 20 MIN: CPT

## 2021-04-05 PROCEDURE — 2700000000 HC OXYGEN THERAPY PER DAY

## 2021-04-05 PROCEDURE — 94640 AIRWAY INHALATION TREATMENT: CPT

## 2021-04-05 PROCEDURE — 97116 GAIT TRAINING THERAPY: CPT

## 2021-04-05 PROCEDURE — 97165 OT EVAL LOW COMPLEX 30 MIN: CPT

## 2021-04-05 PROCEDURE — 87070 CULTURE OTHR SPECIMN AEROBIC: CPT

## 2021-04-05 PROCEDURE — 80048 BASIC METABOLIC PNL TOTAL CA: CPT

## 2021-04-05 RX ORDER — FUROSEMIDE 10 MG/ML
40 INJECTION INTRAMUSCULAR; INTRAVENOUS ONCE
Status: COMPLETED | OUTPATIENT
Start: 2021-04-05 | End: 2021-04-05

## 2021-04-05 RX ADMIN — IPRATROPIUM BROMIDE AND ALBUTEROL SULFATE 1 AMPULE: .5; 3 SOLUTION RESPIRATORY (INHALATION) at 06:14

## 2021-04-05 RX ADMIN — BENZONATATE 200 MG: 100 CAPSULE ORAL at 19:42

## 2021-04-05 RX ADMIN — IPRATROPIUM BROMIDE AND ALBUTEROL SULFATE 1 AMPULE: .5; 3 SOLUTION RESPIRATORY (INHALATION) at 09:29

## 2021-04-05 RX ADMIN — ASPIRIN 81 MG: 81 TABLET, FILM COATED ORAL at 08:37

## 2021-04-05 RX ADMIN — GUAIFENESIN AND DEXTROMETHORPHAN 10 ML: 100; 10 SYRUP ORAL at 20:27

## 2021-04-05 RX ADMIN — FUROSEMIDE 40 MG: 10 INJECTION, SOLUTION INTRAMUSCULAR; INTRAVENOUS at 11:44

## 2021-04-05 RX ADMIN — PREDNISONE 40 MG: 20 TABLET ORAL at 08:36

## 2021-04-05 RX ADMIN — BENZONATATE 200 MG: 100 CAPSULE ORAL at 15:03

## 2021-04-05 RX ADMIN — IBUPROFEN 800 MG: 800 TABLET, FILM COATED ORAL at 19:19

## 2021-04-05 RX ADMIN — IPRATROPIUM BROMIDE AND ALBUTEROL SULFATE 1 AMPULE: .5; 3 SOLUTION RESPIRATORY (INHALATION) at 17:42

## 2021-04-05 RX ADMIN — BENZONATATE 200 MG: 100 CAPSULE ORAL at 08:37

## 2021-04-05 RX ADMIN — METHYLPREDNISOLONE SODIUM SUCCINATE 40 MG: 40 INJECTION, POWDER, FOR SOLUTION INTRAMUSCULAR; INTRAVENOUS at 04:45

## 2021-04-05 RX ADMIN — AZITHROMYCIN MONOHYDRATE 500 MG: 250 TABLET ORAL at 08:36

## 2021-04-05 RX ADMIN — IPRATROPIUM BROMIDE AND ALBUTEROL SULFATE 1 AMPULE: .5; 3 SOLUTION RESPIRATORY (INHALATION) at 13:02

## 2021-04-05 RX ADMIN — ENOXAPARIN SODIUM 40 MG: 40 INJECTION SUBCUTANEOUS at 16:57

## 2021-04-05 RX ADMIN — POTASSIUM CHLORIDE AND SODIUM CHLORIDE: 900; 150 INJECTION, SOLUTION INTRAVENOUS at 08:36

## 2021-04-05 RX ADMIN — BUDESONIDE 500 MCG: 0.5 INHALANT RESPIRATORY (INHALATION) at 06:14

## 2021-04-05 RX ADMIN — BUDESONIDE 500 MCG: 0.5 INHALANT RESPIRATORY (INHALATION) at 17:42

## 2021-04-05 ASSESSMENT — PAIN DESCRIPTION - PAIN TYPE: TYPE: ACUTE PAIN

## 2021-04-05 NOTE — PROGRESS NOTES
lives with son  And his wife in a ranch home  with 1+1+1  to enter with Rail  Tub shower grab bars    Equipment owned: Karth[employer]key[employer], [address] chair and [employer],      [address]   How much difficulty turning over in bed?: None  How much difficulty sitting down on / standing up from a chair with arms?: A Little  How much difficulty moving from lying on back to sitting on side of bed?: A Little  How much help from another person moving to and from a bed to a chair?: A Little  How much help from another person needed to walk in hospital room?: A Little  How much help from another person for climbing 3-5 steps with a railing?: A Little  AM-PAC Inpatient Mobility Raw Score : 19  AM-PAC Inpatient T-Scale Score : 45.44  Mobility Inpatient CMS 0-100% Score: 41.77  Mobility Inpatient CMS G-Code Modifier : CK    Nursing cleared patient for PT evaluation. The admitting diagnosis and active problem list as listed above have been reviewed prior to the initiation of this evaluation. OBJECTIVE;   Initial Evaluation  Date: 4/5/2021 Treatment Date:     Short Term/ Long Term   Goals   Was pt agreeable to Eval/treatment? Yes   To be met in 4 days   Pain level   0/10       Bed Mobility    Rolling: Independent   Supine to sit: Supervision    Sit to supine: Supervision    Scooting: Supervision    Rolling: Independent   Supine to sit:  Independent   Sit to supine: Independent   Scooting: Independent    Transfers Sit to stand: Supervision    Sit to stand: Independent    Ambulation    2x75 feet using  IV pole with Supervision    , assistance with O2 line   150 feet using  no device with Independent    Stair negotiation: ascended and descended   Not assessed       ROM Within functional limits      Strength BUE:  refer to OT eval  RLE:  4+/5  LLE:  4+/5  Increase strength in affected mm groups by 1/3 grade   Balance Sitting EOB:  good   Dynamic Standing:  fair + with IV pole    Dynamic Standing: good Patient is Alert & Oriented x person, place, time and situation and follows directions   Sensation:  Patient  denies numbness and tingling bilateral lower extremities    Edema:  no   Endurance: fair     Vitals: 3 liters nasal cannula   Blood Pressure at rest  Blood Pressure during session    Heart Rate at rest  Heart Rate during session    SPO2 at rest 95%  SPO2 during session 89-92%     Patient education  Patient educated on role of Physical Therapy, risks of immobility, safety and plan of care, energy conservation, importance of positional changes for oxygen exchange,  importance of mobility while in hospital , ankle pumps, quad set and glut set for edema control, blood clot prevention, safety  and O2 line management and safety      Patient response to education:   Pt verbalized understanding Pt demonstrated skill Pt requires further education in this area   Yes Partial Yes      Treatment:  Patient practiced and was instructed/facilitated in the following treatment: Patient Sat edge of bed 5 minutes with Supervision  to increase dynamic sitting balance and activity tolerance. Patient stood and ambulated down hallway using IV pole. Patient required seated rest break for a few minutes, SPO2 was 89%, recovering to 92%. Patient stood and ambulated back to room, sitting in chair, feet elevated. Therapeutic Exercises:  not performed  x  reps. At end of session, patient in chair with alarm call light and phone within reach,  all lines and tubes intact, nursing notified. Patient would benefit from continued skilled Physical Therapy to improve functional independence and quality of life. Patient's/ family goals   home      ASSESSMENT: Patient exhibits decreased strength, balance and endurance impairing gait distance and tolerance to activity . Patient presents with shortness of breath and decreased gait speed during ambulation.      Plan of Care:     -Standing Balance: Perform strengthening

## 2021-04-05 NOTE — CARE COORDINATION
Ss note:4/5/2021.12:01 PM Neg covid on 4-2-2021. Follow up visit to pts room, she resides with her son. Pt has Advanced Directives packet but does not wish to complete at this time. Pt is on 2 liters of oxygen, NO home 02, no agency preference if needed. SW will follow.  EMMANUEL Menon

## 2021-04-05 NOTE — PROGRESS NOTES
P Quality Flow/Interdisciplinary Rounds Progress Note        Quality Flow Rounds held on April 5, 2021    Disciplines Attending:  Bedside Nurse, ,  and Nursing Unit Leadership    Mikayla Hunt was admitted on 4/2/2021  1:40 PM    Anticipated Discharge Date:  Expected Discharge Date: 04/05/21    Disposition:    Jesus Score:  Jesus Scale Score: 23    Readmission Risk              Risk of Unplanned Readmission:        8           Discussed patient goal for the day, patient clinical progression, and barriers to discharge.   The following Goal(s) of the Day/Commitment(s) have been identified:  IV SM, no H O2, activity progression    Noam Mcginnis  April 5, 2021

## 2021-04-05 NOTE — PLAN OF CARE
Problem: Pain:  Goal: Pain level will decrease  Description: Pain level will decrease  Outcome: Met This Shift  Goal: Control of acute pain  Description: Control of acute pain  Outcome: Met This Shift     Problem: Falls - Risk of:  Goal: Will remain free from falls  Description: Will remain free from falls  Outcome: Met This Shift  Goal: Absence of physical injury  Description: Absence of physical injury  Outcome: Met This Shift     Problem: Skin Integrity:  Goal: Will show no infection signs and symptoms  Description: Will show no infection signs and symptoms  Outcome: Met This Shift  Goal: Absence of new skin breakdown  Description: Absence of new skin breakdown  Outcome: Met This Shift

## 2021-04-05 NOTE — PROGRESS NOTES
Department of Internal Medicine        CHIEF COMPLAINT: Shortness of breath, fever/chills body aches and nonproductive cough. Reason for Admission:   Required pneumonia right lower lobe    HISTORY OF PRESENT ILLNESS:      The patient is a 73 y.o. female who presents with increased shortness of breath, fever/chills, scant productive cough and generalized body aches. Patient denied chest pain today but did have some chest tightness yesterday that resolved. There is no associated nausea/vomiting or diarrhea. Patient has a scant productive white sputum off and on. Patient also complains of cephalgia. Patient's had increased shortness of breath with activity. Symptoms started about 5 days ago. Patient states she has not received her Covid vaccine with the COVID-19 antigen being negative. WBC is 3.2 with potassium 3.4. Temperature is normal 98.1 with a heart rate of 100 blood pressure 117/56. O2 sat was 95% on 2 L. Patient was ambulated short distance in the ED and the O2 sat did go down as low as 87% on room air. Chest x-ray show pronounced emphysema along with right greater than left base atelectasis or infiltrates. Patient was mated the hospital because of acute hypoxic respiratory failure and possibly underlying viral versus bacterial pneumonia in right lung. 4/3/2021  Patient seen examined on telemetry floor. Patient feels little bit better today. Patient still has a harsh nonproductive cough. Lung sounds still have some mild expiratory wheezing and coarse breath sounds. Patient still short of breath with activity. BUN/creatinine is 12/0.7 with potassium improved to 4.2. WBC still low at 3.1 with hemoglobin 1.6. Temperature ranges 98.699.1 with heart rate 94. Blood pressure ranges 90/50 5113/55. O2 sat 94% on 2 L nasal cannula at rest.  CT the chest showed extensive emphysematous changes with scarring or atelectasis in the bases.   With mild leukopenia and a CAT scan showing no definitive Not on file     Attends meetings of clubs or organizations: Not on file     Relationship status: Not on file    Intimate partner violence     Fear of current or ex partner: Not on file     Emotionally abused: Not on file     Physically abused: Not on file     Forced sexual activity: Not on file   Other Topics Concern    Not on file   Social History Narrative    Not on file       Family History:   Family History   Problem Relation Age of Onset    Liver Disease Father     Cancer Father     Heart Disease Father     Cancer Other         breast    Cancer Mother         breast    Heart Disease Mother     Dementia Mother     Diabetes Mother        REVIEW OF SYSTEMS:    Gen: Patient denies any lightheadedness or dizziness. No LOC or syncope. No fevers or chills. HEENT: No earache, sore throat or nasal congestion. Resp: +cough with scant white productive sputum, no hemoptysis   Cardiac: Denies chest pain, SOB, diaphoresis or palpitations. GI: No nausea, vomiting, diarrhea or constipation. No melena or hematochezia. : No urinary complaints, dysuria, hematuria or frequency. MSK: No extremity weakness, paralysis or paresthesias. PHYSICAL EXAM:    Vitals:  BP (!) 141/75   Pulse 72   Temp 97.9 °F (36.6 °C) (Oral)   Resp 20   Ht 5' 6\" (1.676 m)   Wt 148 lb (67.1 kg)   SpO2 95%   BMI 23.89 kg/m²     General:  This is a 73 y.o. yo female who is alert and oriented in NAD  HEENT:  Head is normocephalic and atraumatic, PERRLA, EOMI, mucus membranes moist with no pharyngeal erythema or exudate. Neck:  Supple with no carotid bruits, JVD or thyromegaly.   No cervical adenopathy  CV:  Regular rate and rhythm, no murmurs  Lungs: Coarse decreased breath sounds to auscultation bilaterally with mild scattered right lung crackles with no wheezes, rhonchi  Abdomen:  Soft, nontender, nondistended, bowel sounds present  Extremities:  No edema, peripheral pulses intact bilaterally  Neuro:  Cranial nerves Value Date    LABA1C 5.2 07/02/2017     FLP:    Lab Results   Component Value Date    TRIG 69 04/03/2021    HDL 50 04/03/2021    LDLCALC 58 04/03/2021    LABVLDL 14 04/03/2021     TSH:    Lab Results   Component Value Date    TSH 1.810 04/03/2021     IRON:  No results found for: IRON  LIPASE:    Lab Results   Component Value Date    LIPASE 39 01/03/2020       ASSESSMENT AND PLAN:      Patient Active Problem List    Diagnosis Date Noted    CAP (community acquired pneumonia) 04/10/2015     Priority: High    Syncope 04/10/2015     Priority: High    TIA (transient ischemic attack) 06/01/2013     Priority: High    COPD exacerbation [facility] 04/02/2021    Closed compression fracture of lumbar vertebra ([facility] 07/01/2017    Thoracic outlet syndrome     COPD (chronic obstructive pulmonary disease) ([facility] 02/02/2011    Anxiety 02/02/2011    CAD (coronary artery disease) 02/02/2011    Tobacco abuse 02/02/2011    Hypothyroidism 02/02/2011    GERD (gastroesophageal reflux disease) 02/02/2011    Dyslipidemia 02/02/2011     Impression:  1. Acute hypoxic respiratory failure  2. Acute exacerbation of COPD with current tobacco use  3. Possible right lower lobe pneumonia  4. History of coronary disease  5. Hypothyroidism  6. Hyperlipidemia  7. History of TIA    Plan:  Admit to telemetry floor  Home medication reviewed  Monitor heart rate, blood pressure, O2 saturation  O2 nasal cannula Lovenox 40 mg subcu daily  DuoNeb aerosols 4 times daily  Sputum and blood cultures  Azithromycin 500 mg daily  Robitussin-DM 10 cc every 4 hours as needed  General diet  Up ad merle.     O2 saturation on room air with activity today and if O2 sat less than 89% will send with home O2 tomorrow  Prednisone 40 mg p.o. daily  Tessalon Perles 200 mg p.o. 3 times daily      Bonifacio Whitaker D.O.  4/5/2021  10:54 AM

## 2021-04-05 NOTE — PROGRESS NOTES
Occupational Therapy  OCCUPATIONAL THERAPY INITIAL EVALUATION      Date:2021  Patient Name: Manuel Escobar  MRN: [id number]  : 1950  Room: [address] [place] [address]    Referring Provider: Diego Mckeon DO    Evaluating OT: Dashawn Mosley OTR/TERESA #[id number]    AM-PAC Daily Activity Raw Score:     Recommended Placement: Home with Assist  Recommended Adaptive Equipment: TBD     Diagnosis:   1. Community acquired pneumonia of right lower lobe of lung    2. COPD exacerbation [facility])        Surgery: N/A      Pertinent Medical History:    Past Medical History:   Diagnosis Date    [facility]ab[facility]) [facility]     Arthritis     Blood circulation, collateral     thoracic outlet syndrome    CAD (coronary artery disease)     mi    COPD (chronic obstructive pulmonary disease) (HCC)     Emphysema with chronic bronchitis (HCC)     GERD (gastroesophageal reflux disease)     Hypothyroidism     MI [facility]myocardial infarction) ([facility]     Substance abuse ([facility]     tobacco    Thoracic outlet syndrome     TIA (transient ischemic attack)     Unspecified cerebral artery occlusion with cerebral infarction       Precautions:  Falls, O2, watch HR     Home Living: Pt lives with son in a 1 story home with 3 SAULO with 0 rail(s).   Bathroom setup: tub/shower with grab bars   Equipment owned: shower chair, w/c, cane    Prior Level of Function: Independent with ADLs , Independent with IADLs; ambulated with cane when foot hurts  Driving: Yes  Occupation: Not reported    Pain Level: Upset stomach  Cognition: A&O: 4/4; Follows 2 step directions   Memory:  good   Sequencing:  good   Problem solving:  Fair+   Judgement/safety:  Fair+     Functional Assessment:   Initial Eval Status  Date: 21 Treatment Status  Date: STGs = LTGs  Time frame: 5-7 days   Feeding Independent        Grooming Supervision     Independent    UB Dressing Supervision     Independent    LB Dressing Minimal Assist   fdUsing commode in bathroom  Cuing on PLB, functional transfers/mobility and ADLs  Therapeutic exercise to improve motor endurance, ROM, and functional strength for ADLs/functional transfers  Therapeutic activities to facilitate/challenge dynamic balance, stand tolerance, fine motor dexterity/in-hand manipulation for increased independence with ADLs  Neuro-muscular re-education: facilitation of righting/equilibrium reactions, midline orientation, scapular stability/mobility, normalization of muscle tone, and facilitation of volitional active controled movement    Rehab Potential: Good for established goals     Patient / Family Goal: Return home      Patient and/or family were instructed on functional diagnosis, prognosis/goals and OT plan of care. Demonstrated fair+ understanding. Eval Complexity: Low      Time In: 1410  Time Out: 1433  Total Treatment Time: 15    Min Units   OT Eval Low 97165  X  1   OT Eval Medium 49235      OT Eval High 08597       OT Re-Eval [id number]       Therapeutic Ex 56122       Therapeutic Activities 68274  10 1    ADL/Self Care 09818  5     Orthotic Management 92276       Neuro Re-Ed 78786       Non-Billable Time          Evaluation Time includes thorough review of current medical information, gathering information on past medical history/social history and prior level of function, completion of standardized testing/informal observation of tasks, assessment of data and education on plan of care and goals.     Deven Pierre OTR/L #[id number]

## 2021-04-06 LAB
ANION GAP SERPL CALCULATED.3IONS-SCNC: 10 MMOL/L (ref 7–16)
BUN BLDV-MCNC: 19 MG/DL (ref 8–23)
CALCIUM SERPL-MCNC: 8.5 MG/DL (ref 8.6–10.2)
CHLORIDE BLD-SCNC: 105 MMOL/L (ref 98–107)
CO2: 26 MMOL/L (ref 22–29)
CREAT SERPL-MCNC: 0.7 MG/DL (ref 0.5–1)
GFR AFRICAN AMERICAN: >60
GFR NON-AFRICAN AMERICAN: >60 ML/MIN/1.73
GLUCOSE BLD-MCNC: 98 MG/DL (ref 74–99)
POTASSIUM SERPL-SCNC: 4.9 MMOL/L (ref 3.5–5)
SODIUM BLD-SCNC: 141 MMOL/L (ref 132–146)

## 2021-04-06 PROCEDURE — 6370000000 HC RX 637 (ALT 250 FOR IP): Performed by: INTERNAL MEDICINE

## 2021-04-06 PROCEDURE — 97535 SELF CARE MNGMENT TRAINING: CPT

## 2021-04-06 PROCEDURE — 80048 BASIC METABOLIC PNL TOTAL CA: CPT

## 2021-04-06 PROCEDURE — 1200000000 HC SEMI PRIVATE

## 2021-04-06 PROCEDURE — 97116 GAIT TRAINING THERAPY: CPT

## 2021-04-06 PROCEDURE — 2700000000 HC OXYGEN THERAPY PER DAY

## 2021-04-06 PROCEDURE — 6360000002 HC RX W HCPCS: Performed by: INTERNAL MEDICINE

## 2021-04-06 PROCEDURE — 97530 THERAPEUTIC ACTIVITIES: CPT

## 2021-04-06 PROCEDURE — 97110 THERAPEUTIC EXERCISES: CPT

## 2021-04-06 PROCEDURE — 94640 AIRWAY INHALATION TREATMENT: CPT

## 2021-04-06 PROCEDURE — 36415 COLL VENOUS BLD VENIPUNCTURE: CPT

## 2021-04-06 RX ADMIN — ENOXAPARIN SODIUM 40 MG: 40 INJECTION SUBCUTANEOUS at 17:59

## 2021-04-06 RX ADMIN — IPRATROPIUM BROMIDE AND ALBUTEROL SULFATE 1 AMPULE: .5; 3 SOLUTION RESPIRATORY (INHALATION) at 09:41

## 2021-04-06 RX ADMIN — IPRATROPIUM BROMIDE AND ALBUTEROL SULFATE 1 AMPULE: .5; 3 SOLUTION RESPIRATORY (INHALATION) at 17:33

## 2021-04-06 RX ADMIN — IPRATROPIUM BROMIDE AND ALBUTEROL SULFATE 1 AMPULE: .5; 3 SOLUTION RESPIRATORY (INHALATION) at 13:36

## 2021-04-06 RX ADMIN — BENZONATATE 200 MG: 100 CAPSULE ORAL at 08:08

## 2021-04-06 RX ADMIN — IBUPROFEN 800 MG: 800 TABLET, FILM COATED ORAL at 14:12

## 2021-04-06 RX ADMIN — BUDESONIDE 500 MCG: 0.5 INHALANT RESPIRATORY (INHALATION) at 06:15

## 2021-04-06 RX ADMIN — BUDESONIDE 500 MCG: 0.5 INHALANT RESPIRATORY (INHALATION) at 17:33

## 2021-04-06 RX ADMIN — AZITHROMYCIN MONOHYDRATE 500 MG: 250 TABLET ORAL at 08:08

## 2021-04-06 RX ADMIN — BENZONATATE 200 MG: 100 CAPSULE ORAL at 14:01

## 2021-04-06 RX ADMIN — PREDNISONE 40 MG: 20 TABLET ORAL at 08:08

## 2021-04-06 RX ADMIN — IPRATROPIUM BROMIDE AND ALBUTEROL SULFATE 1 AMPULE: .5; 3 SOLUTION RESPIRATORY (INHALATION) at 06:15

## 2021-04-06 RX ADMIN — BENZONATATE 200 MG: 100 CAPSULE ORAL at 20:26

## 2021-04-06 RX ADMIN — ASPIRIN 81 MG: 81 TABLET, FILM COATED ORAL at 08:08

## 2021-04-06 ASSESSMENT — PAIN DESCRIPTION - PROGRESSION
CLINICAL_PROGRESSION: GRADUALLY IMPROVING
CLINICAL_PROGRESSION: GRADUALLY IMPROVING

## 2021-04-06 ASSESSMENT — PAIN SCALES - GENERAL: PAINLEVEL_OUTOF10: 0

## 2021-04-06 NOTE — PROGRESS NOTES
P Quality Flow/Interdisciplinary Rounds Progress Note        Quality Flow Rounds held on April 6, 2021    Disciplines Attending:  Bedside Nurse, ,  and Nursing Unit Leadership    Isak Greer was admitted on 4/2/2021  1:40 PM    Anticipated Discharge Date:  Expected Discharge Date: 04/05/21    Disposition:    Jesus Score:  Jesus Scale Score: 20    Readmission Risk              Risk of Unplanned Readmission:        8           Discussed patient goal for the day, patient clinical progression, and barriers to discharge.   The following Goal(s) of the Day/Commitment(s) have been identified:  PT/OT, activity progression, 4L MARANDA Esqueda  April 6, 2021

## 2021-04-06 NOTE — PROGRESS NOTES
OT BEDSIDE TREATMENT NOTE      Date:2021  Patient Name: Mariama Amato  MRN: [id number]  : 1950  Room: [address] [place] [address]     Referring Provider: Ricci Grider DO     Evaluating OT: KIA Mckeon #[id number]     AM-PAC Daily Activity Raw Score:      Recommended Placement: Home with Assist  Recommended Adaptive Equipment: TBD      Diagnosis:   1. Community acquired pneumonia of right lower lobe of lung    2. COPD exacerbation [facility])          Surgery: N/A       Pertinent Medical History:    Past Medical History        Past Medical History:   Diagnosis Date    [facility]) [facility]     Arthritis      Blood circulation, collateral       thoracic outlet syndrome    CAD (coronary artery disease)       mi    COPD (chronic obstructive pulmonary disease) (HCC)      Emphysema with chronic bronchitis (HCC)      GERD (gastroesophageal reflux disease)      Hypothyroidism      MI (myocardial infarction) ([facility]      [facility]       tobacco    Thoracic outlet syndrome      TIA (transient ischemic attack)      Unspecified cerebral artery occlusion with cerebral infarction           Precautions:  Falls, O2, watch HR     Home Living: Pt lives with son in a 1 story home with 3 SAULO with 0 rail(s).   Bathroom setup: tub/shower with grab bars   Equipment owned: shower chair, w/c, cane     Prior Level of Function: Independent with ADLs , Independent with IADLs; ambulated with cane when foot hurts  Driving: Yes  Occupation: Not reported     Pain Level: Upset stomach  Cognition: A&O: 4/4; Follows 2 step directions              Memory:  good              Sequencing:  good              Problem solving:  Fair+              Judgement/safety:  Fair+                Functional Assessment:    Initial Eval Status  Date: 21 Treatment Status  Date: 21 STGs = LTGs  Time frame: 5-7 days   Feeding Independent     Independent       Grooming Supervision    Supervision pt standing at sink to wash hands   Independent    UB Dressing Supervision     n/t Independent    LB Dressing Minimal Assist   fdUsing commode in bathroom  Cuing on PLB, energy conservation and safety SBA to nahum pants v/c's for safety; To nahum/doff flip flops; pt declined to [facility] slipper socks   Independent    Bathing Minimal Assist    N/t; pt educated with regards to DME, bathing AE, ECT's  Modified Barnes    Toileting Supervision     Supervision completed all aspects of toileting  Independent    Bed Mobility  Supine to sit: Independent   Sit to supine: Independent     Supine>sit independent  Supine to sit: Independent   Sit to supine: Independent    Functional Transfers Supervision   Sit<>stand  Bed, chair  Cuing on PLB, body mechanics. Sit<>stand supervision from EOB to chair no AD  Independent    Functional Mobility Stand by Assist  No AD  75 ft x 2  Mild unsteadiness initially, 1 LOB pt able to correct. Cuing on rest breaks, PLB, energy conservation and safety SBA less than house hold distances no AD  Modified Barnes    Balance Sitting:     Static:  Fair+    Dynamic:fair+  Standing: fair Sitting:   Static: Good   Dynamic: Good   Standing: fair  Sitting:     Static:  good    Dynamic:good  Standing: good   Activity Tolerance Fair  Elevated HR throughout session  110 at rest, 140 with activity  Rest breaks PRN to return to baseline. SpO2 90-98% on 3L Fair pt SPO2 at rest 95% with Activity 92%   -130 BPM with activity v/c's for pursed lip breathing with HR at 97 BPM at rest  Fair+   Visual/  Perceptual Glasses: Yes   WFL             Comments: Upon arrival pt supine in bed agreeable to therapy session. Pt educated with regards to functional transfers, functional mobility, energy conservation techniques, DME, bathing AE, LE dressing techniques, safety awareness. At end of session pt seated in chair, nursing aware all lines and tubes intact, call light within reach.      · Pt has made fair  progress towards set goals.    · Continue with current plan of care      Treatment Time In:1010            Treatment Time Out: 3159                Treatment Charges: Mins Units   Ther Ex  59535     Manual Therapy Roberto Toribio [phone] 20 1   ADL/Home Mgt 34793 15 1   Neuro Re-ed 31983     Group Therapy      Orthotic manage/training  37205     Non-Billable Time     Total Timed Treatment [address] QUEEN/L [id number]

## 2021-04-06 NOTE — CARE COORDINATION
Ss note:4/6/2021 12:48 PM Neg covid on 4-2-21. Per QFR pt is now on 4 liters of oxygen. Will need qualifying pulse ox testing and home 02 order to arrange if needed at discharge, no agency preference. Pt resides with her son, is independent at home.  EMMANUEL Brewer

## 2021-04-06 NOTE — PROGRESS NOTES
lives with son  And his wife in a ranch home  with 1+1+1  to enter with Rail  Tub shower grab bars    Equipment owned: Karthikeyan, [address] chair and [employer],      [address]   How much difficulty turning over in bed?: None  How much difficulty sitting down on / standing up from a chair with arms?: A Little  How much difficulty moving from lying on back to sitting on side of bed?: A Little  How much help from another person moving to and from a bed to a chair?: A Little  How much help from another person needed to walk in hospital room?: A Little  How much help from another person for climbing 3-5 steps with a railing?: A Little  AM-PAC Inpatient Mobility Raw Score : 19  AM-PAC Inpatient T-Scale Score : 45.44  Mobility Inpatient CMS 0-100% Score: 41.77  Mobility Inpatient CMS G-Code Modifier : CK    Nursing cleared patient for PT treatment. Pt has been having elevated heart rate lately, on 4L o2     OBJECTIVE;   Initial Evaluation  Date: 4/5/2021 Treatment Date:    4/6/2021   Short Term/ Long Term   Goals   Was pt agreeable to Eval/treatment? Yes  yes To be met in 4 days   Pain level   0/10   0/10    Bed Mobility    Rolling: Independent   Supine to sit: Supervision    Sit to supine: Supervision    Scooting: Supervision   Rolling: Supervision    Supine to sit: Supervision    Sit to supine: Supervision    Scooting: Supervision     Rolling: Independent   Supine to sit: Independent   Sit to supine: Independent   Scooting: Independent    Transfers Sit to stand: Supervision   Sit to stand: Supervision  cuing for safety and pace     Sit to stand: Independent    Ambulation    2x75 feet using  IV pole with Supervision    , assistance with O2 line 2x75 feet using  hand held assist with Minimal assist of 1   for managing o2 line and cuing for breathing and safety throughout.     150 feet using  no device with Independent    Stair negotiation: ascended and descended   Not assessed       ROM and tubes intact, nursing notified. Patient would benefit from continued skilled Physical Therapy to improve functional independence and quality of life. Patient's/ family goals   home      ASSESSMENT: Patient exhibits decreased strength, balance and endurance impairing gait distance and tolerance to activity . Patient displaying increased HR throughout session, closely monitored as well as SPO2 levels. Pt fatigued at end of gait training and displaying forward posture. Plan of Care:     -Standing Balance: Perform strengthening exercises in standing to promote motor control with or without upper extremity support   -Gait: Gait training and Standing activities to improve: base of support, weight shift, weight bearing    -Endurance: Utilize Supervised activities to increase level of endurance to allow for safe functional mobility including transfers and gait     Patient and or family understand(s) diagnosis, prognosis, and plan of care. Frequency of treatments: Patient will be seen  daily.        Time in  257  Time out  327    Total Treatment Time  30 minutes      CPT codes:  Therapeutic exercises (12963)   12 minutes  1 unit(s)  Gait Training (33105) 18 minutes 1 unit(s)    Mora Jones, PTA #[id number]

## 2021-04-06 NOTE — PROGRESS NOTES
pneumonia the patient possibly has a has a viral bronchitis/pneumonitis versus bacterial bronchitis. 4/4/2021  Patient seen examined on telemetry floor. Patient denies any chest or abdominal pain. Patient still complains of increased shortness of breath with activity and only about 25% improved from admission. Patient O2 saturation with activity yesterday on room air was 87%. Temperature was 97.8 with a heart rate of 100 and sinus rhythm. Blood pressure 120/70 with O2 sat today on 2 L nasal cannula was 91-96% at rest.  Patient lung sounds are improved today. 4/5/2021  Patient seen examined on telemetry floor. Patient still feels short of breath with any activity. Patient denies any chest or abdominal pain. There is no nausea or vomiting. Patient still with moist nonproductive cough. BUN/creatinine 13/0.6 with potassium increased to 5.3 today. CBC is normal.  Temperature 97.9 with heart rate 72 with blood pressure currently 141/75. O2 sat 95% on 2 L nasal cannula. Patient with persistent coarse breath sounds with mild scattered occasional rhonchi. 4/6/2021  Patient seen examined on telemetry floor. Case discussed with patient's nurse at the bedside today. Patient's heart rate dramatically increased today with activity and with patient having the oxygen off for short period time. Patient's O2 sat did drop into the 80s on room air. Patient's heart rate improved with putting oxygen back on. Currently the patient denies any problem chest pain, abdominal pain, nausea/vomiting or unusual shortness of breath at rest.  Patient little bit more symptomatic with dyspnea with exertion today. Patient lung sounds have decreased coarse breath sounds without wheezing or rhonchi today. BUN/creatinine was 19/0.7. Temperature 98.3 with heart rate 83. Patient thought to be in atrial fibrillation by nursing today. Blood pressure 123/73 with O2 sat 97% on 4 L nasal cannula.     Past Medical History:    Past Medical History:   Diagnosis Date    AAA (abdominal aortic aneurysm) ([facility] 2014    Arthritis     Blood circulation, collateral     thoracic outlet syndrome    CAD (coronary artery disease)     mi    COPD (chronic obstructive pulmonary disease) (HCC)     Emphysema with chronic bronchitis (HCC)     GERD (gastroesophageal reflux disease)     Hypothyroidism     MI (myocardial infarction) ([facility]     Substance abuse ([facility]     tobacco    Thoracic outlet syndrome     TIA (transient ischemic attack)     Unspecified cerebral artery occlusion with cerebral infarction      Past Surgical History:    Past Surgical History:   Procedure Laterality Date    APPENDECTOMY      OVARY REMOVAL      also tube on right removed    TONSILLECTOMY AND ADENOIDECTOMY      TUBAL LIGATION Bilateral     UTERINE SUSPENSION         Medications Prior to Admission:    @  Prior to Admission medications    Medication Sig Start Date End Date Taking? Authorizing Provider   Calcium Carbonate Antacid (TUMS ULTRA 1000) 1000 MG CHEW Take 1,000 mg by mouth 3 times daily as needed (Reflux)   Yes Historical Provider, MD   ibuprofen (ADVIL;MOTRIN) 200 MG tablet Take 400 mg by mouth every 6 hours as needed for Pain   Yes Historical Provider, MD   Cholecalciferol (VITAMIN D3) 50 MCG (2000 UT) CAPS Take 2,000 Units by mouth daily   Yes Historical Provider, MD   aspirin 81 MG EC tablet Take 1 tablet by mouth daily 12/5/19  Yes Antoni Wagner DO       Allergies:  Penicillins, Tetanus toxoids, Ciprofloxacin, Effexor [venlafaxine], Kiwi extract, Betadine [povidone iodine], Codeine, Demerol, Sulfa antibiotics, and Tape [place] tape]    Social History:   Social History     Socioeconomic History    Marital status:       Spouse name: Not on file    Number of children: Not on file    Years of education: Not on file    Highest education level: Not on file   Occupational History    Not on file   Social Needs    Financial resource strain: Not on extremity weakness, paralysis or paresthesias. PHYSICAL EXAM:    Vitals:  /73   Pulse 83   Temp 98.3 °F (36.8 °C) (Oral)   Resp 18   Ht 5' 6\" (1.676 m)   Wt 148 lb (67.1 kg)   SpO2 97%   BMI 23.89 kg/m²     General:  This is a 73 y.o. yo female who is alert and oriented in NAD  HEENT:  Head is normocephalic and atraumatic, PERRLA, EOMI, mucus membranes moist with no pharyngeal erythema or exudate. Neck:  Supple with no carotid bruits, JVD or thyromegaly.   No cervical adenopathy  CV:  Regular rate and rhythm, no murmurs  Lungs: Coarse decreased breath sounds to auscultation bilaterally with mild scattered right lung crackles with no wheezes, rhonchi  Abdomen:  Soft, nontender, nondistended, bowel sounds present  Extremities:  No edema, peripheral pulses intact bilaterally  Neuro:  Cranial nerves II-XII grossly intact; motor and sensory function intact with no focal deficits  Skin:  No rashes, lesions or wounds    DATA:  CBC with Differential:    Lab Results   Component Value Date    WBC 9.1 04/05/2021    RBC 3.97 04/05/2021    HGB 12.4 04/05/2021    HCT 37.4 04/05/2021     04/05/2021    MCV 94.2 04/05/2021    MCH 31.2 04/05/2021    MCHC 33.2 04/05/2021    RDW 13.2 04/05/2021    LYMPHOPCT 4.3 04/05/2021    MONOPCT 2.6 04/05/2021    BASOPCT 0.0 04/05/2021    MONOSABS 0.27 04/05/2021    LYMPHSABS 0.36 04/05/2021    EOSABS 0.00 04/05/2021    BASOSABS 0.00 04/05/2021     CMP:    Lab Results   Component Value Date     04/06/2021    K 4.9 04/06/2021    K 3.4 04/02/2021     04/06/2021    CO2 26 04/06/2021    BUN 19 04/06/2021    CREATININE 0.7 04/06/2021    GFRAA >60 04/06/2021    LABGLOM >60 04/06/2021    GLUCOSE 98 04/06/2021    GLUCOSE 83 02/03/2011    PROT 6.4 04/03/2021    LABALBU 3.2 04/03/2021    LABALBU 4.1 02/03/2011    CALCIUM 8.5 04/06/2021    BILITOT <0.2 04/03/2021    ALKPHOS 97 04/03/2021    AST 27 04/03/2021    ALT 12 04/03/2021     Magnesium:    Lab Results   Component Value Date    MG 1.8 04/03/2021     Phosphorus:    Lab Results   Component Value Date    PHOS 4.0 07/02/2017     PT/INR:    Lab Results   Component Value Date    PROTIME 12.1 04/02/2021    INR 1.0 04/02/2021     Troponin:    Lab Results   Component Value Date    TROPONINI <0.01 04/02/2021     U/A:    Lab Results   Component Value Date    COLORU Straw 12/03/2019    PROTEINU Negative 12/03/2019    PHUR 7.0 12/03/2019    WBCUA 0-1 12/03/2019    RBCUA NONE 12/03/2019    BACTERIA NONE 12/03/2019    CLARITYU Clear 12/03/2019    SPECGRAV 1.010 12/03/2019    LEUKOCYTESUR TRACE 12/03/2019    UROBILINOGEN 0.2 12/03/2019    BILIRUBINUR Negative 12/03/2019    BLOODU TRACE 12/03/2019    GLUCOSEU Negative 12/03/2019     ABG:  No results found for: PH, PCO2, PO2, HCO3, BE, THGB, TCO2, O2SAT  HgBA1c:    Lab Results   Component Value Date    LABA1C 5.2 07/02/2017     FLP:    Lab Results   Component Value Date    TRIG 69 04/03/2021    HDL 50 04/03/2021    LDLCALC 58 04/03/2021    LABVLDL 14 04/03/2021     TSH:    Lab Results   Component Value Date    TSH 1.810 04/03/2021     IRON:  No results found for: IRON  LIPASE:    Lab Results   Component Value Date    LIPASE 39 01/03/2020       ASSESSMENT AND PLAN:      Patient Active Problem List    Diagnosis Date Noted    CAP (community acquired pneumonia) 04/10/2015     Priority: High    Syncope 04/10/2015     Priority: High    TIA (transient ischemic attack) 06/01/2013     Priority: High    COPD exacerbation ([facility] 04/02/2021    Closed compression fracture of lumbar vertebra ([facility] 07/01/2017    Thoracic outlet syndrome     COPD (chronic obstructive pulmonary disease) ([facility] 02/02/2011    Anxiety 02/02/2011    CAD (coronary artery disease) 02/02/2011    Tobacco abuse 02/02/2011    Hypothyroidism 02/02/2011    GERD (gastroesophageal reflux disease) 02/02/2011    Dyslipidemia 02/02/2011     Impression:  1. Acute hypoxic respiratory failure  2.   Acute exacerbation of COPD with current tobacco use  3. Possible right lower lobe pneumonia  4. History of coronary disease  5. Hypothyroidism  6. Hyperlipidemia  7. History of TIA    Plan:  Admit to telemetry floor  Home medication reviewed  Monitor heart rate, blood pressure, O2 saturation  O2 nasal cannula Lovenox 40 mg subcu daily  DuoNeb aerosols 4 times daily  Sputum and blood cultures-negative  Azithromycin 500 mg daily  Robitussin-DM 10 cc every 4 hours as needed  General diet  Up ad merle.     O2 saturation on room ai  Prednisone 40 mg p.o. daily  Tessalon Perles 200 mg p.o. 3 times daily      Giuseppe Terry D.O.  4/6/2021  11:07 AM

## 2021-04-07 ENCOUNTER — APPOINTMENT (OUTPATIENT)
Dept: GENERAL RADIOLOGY | Age: 71
DRG: 177 | End: 2021-04-07
Payer: MEDICARE

## 2021-04-07 LAB
ADENOVIRUS BY PCR: NOT DETECTED
ANION GAP SERPL CALCULATED.3IONS-SCNC: 9 MMOL/L (ref 7–16)
BASOPHILS ABSOLUTE: 0 E9/L (ref 0–0.2)
BASOPHILS RELATIVE PERCENT: 0.3 % (ref 0–2)
BLOOD CULTURE, ROUTINE: NORMAL
BORDETELLA PARAPERTUSSIS BY PCR: NOT DETECTED
BORDETELLA PERTUSSIS BY PCR: NOT DETECTED
BUN BLDV-MCNC: 19 MG/DL (ref 8–23)
CALCIUM SERPL-MCNC: 7.7 MG/DL (ref 8.6–10.2)
CHLAMYDOPHILIA PNEUMONIAE BY PCR: NOT DETECTED
CHLORIDE BLD-SCNC: 99 MMOL/L (ref 98–107)
CO2: 24 MMOL/L (ref 22–29)
CORONAVIRUS 229E BY PCR: NOT DETECTED
CORONAVIRUS HKU1 BY PCR: NOT DETECTED
CORONAVIRUS NL63 BY PCR: NOT DETECTED
CORONAVIRUS OC43 BY PCR: NOT DETECTED
CREAT SERPL-MCNC: 0.8 MG/DL (ref 0.5–1)
CULTURE, BLOOD 2: NORMAL
EOSINOPHILS ABSOLUTE: 0 E9/L (ref 0.05–0.5)
EOSINOPHILS RELATIVE PERCENT: 0 % (ref 0–6)
GFR AFRICAN AMERICAN: >60
GFR NON-AFRICAN AMERICAN: >60 ML/MIN/1.73
GLUCOSE BLD-MCNC: 119 MG/DL (ref 74–99)
HCT VFR BLD CALC: 36.1 % (ref 34–48)
HEMOGLOBIN: 12.3 G/DL (ref 11.5–15.5)
HUMAN METAPNEUMOVIRUS BY PCR: NOT DETECTED
HUMAN RHINOVIRUS/ENTEROVIRUS BY PCR: NOT DETECTED
INFLUENZA A BY PCR: NOT DETECTED
INFLUENZA B BY PCR: NOT DETECTED
LYMPHOCYTES ABSOLUTE: 0.28 E9/L (ref 1.5–4)
LYMPHOCYTES RELATIVE PERCENT: 4.3 % (ref 20–42)
MCH RBC QN AUTO: 31 PG (ref 26–35)
MCHC RBC AUTO-ENTMCNC: 34.1 % (ref 32–34.5)
MCV RBC AUTO: 90.9 FL (ref 80–99.9)
MONOCYTES ABSOLUTE: 0.07 E9/L (ref 0.1–0.95)
MONOCYTES RELATIVE PERCENT: 0.9 % (ref 2–12)
MYCOPLASMA PNEUMONIAE BY PCR: NOT DETECTED
MYELOCYTE PERCENT: 2.6 % (ref 0–0)
NEUTROPHILS ABSOLUTE: 6.65 E9/L (ref 1.8–7.3)
NEUTROPHILS RELATIVE PERCENT: 92.2 % (ref 43–80)
PARAINFLUENZA VIRUS 1 BY PCR: NOT DETECTED
PARAINFLUENZA VIRUS 2 BY PCR: NOT DETECTED
PARAINFLUENZA VIRUS 3 BY PCR: NOT DETECTED
PARAINFLUENZA VIRUS 4 BY PCR: NOT DETECTED
PDW BLD-RTO: 13.2 FL (ref 11.5–15)
PLATELET # BLD: 294 E9/L (ref 130–450)
PMV BLD AUTO: 9.6 FL (ref 7–12)
POTASSIUM SERPL-SCNC: 3.8 MMOL/L (ref 3.5–5)
RBC # BLD: 3.97 E12/L (ref 3.5–5.5)
RBC # BLD: NORMAL 10*6/UL
RESPIRATORY SYNCYTIAL VIRUS BY PCR: NOT DETECTED
SARS-COV-2, PCR: DETECTED
SODIUM BLD-SCNC: 132 MMOL/L (ref 132–146)
WBC # BLD: 7 E9/L (ref 4.5–11.5)

## 2021-04-07 PROCEDURE — 2580000003 HC RX 258: Performed by: INTERNAL MEDICINE

## 2021-04-07 PROCEDURE — 85025 COMPLETE CBC W/AUTO DIFF WBC: CPT

## 2021-04-07 PROCEDURE — 6360000002 HC RX W HCPCS: Performed by: INTERNAL MEDICINE

## 2021-04-07 PROCEDURE — 1200000000 HC SEMI PRIVATE

## 2021-04-07 PROCEDURE — 36415 COLL VENOUS BLD VENIPUNCTURE: CPT

## 2021-04-07 PROCEDURE — 71046 X-RAY EXAM CHEST 2 VIEWS: CPT

## 2021-04-07 PROCEDURE — 6370000000 HC RX 637 (ALT 250 FOR IP): Performed by: INTERNAL MEDICINE

## 2021-04-07 PROCEDURE — 80048 BASIC METABOLIC PNL TOTAL CA: CPT

## 2021-04-07 PROCEDURE — 87040 BLOOD CULTURE FOR BACTERIA: CPT

## 2021-04-07 PROCEDURE — 2700000000 HC OXYGEN THERAPY PER DAY

## 2021-04-07 PROCEDURE — 94640 AIRWAY INHALATION TREATMENT: CPT

## 2021-04-07 PROCEDURE — 0202U NFCT DS 22 TRGT SARS-COV-2: CPT

## 2021-04-07 RX ORDER — LANOLIN ALCOHOL/MO/W.PET/CERES
50 CREAM (GRAM) TOPICAL DAILY
Status: DISCONTINUED | OUTPATIENT
Start: 2021-04-07 | End: 2021-04-13 | Stop reason: HOSPADM

## 2021-04-07 RX ORDER — ASCORBIC ACID 500 MG
1000 TABLET ORAL DAILY
Status: DISCONTINUED | OUTPATIENT
Start: 2021-04-07 | End: 2021-04-13 | Stop reason: HOSPADM

## 2021-04-07 RX ORDER — BUDESONIDE AND FORMOTEROL FUMARATE DIHYDRATE 160; 4.5 UG/1; UG/1
2 AEROSOL RESPIRATORY (INHALATION) 2 TIMES DAILY
Status: DISCONTINUED | OUTPATIENT
Start: 2021-04-07 | End: 2021-04-13 | Stop reason: HOSPADM

## 2021-04-07 RX ORDER — SODIUM CHLORIDE 9 MG/ML
INJECTION, SOLUTION INTRAVENOUS CONTINUOUS
Status: DISCONTINUED | OUTPATIENT
Start: 2021-04-07 | End: 2021-04-11

## 2021-04-07 RX ORDER — ZINC SULFATE 50(220)MG
50 CAPSULE ORAL DAILY
Status: DISCONTINUED | OUTPATIENT
Start: 2021-04-07 | End: 2021-04-13 | Stop reason: HOSPADM

## 2021-04-07 RX ORDER — GAUZE BANDAGE 2" X 2"
100 BANDAGE TOPICAL DAILY
Status: DISCONTINUED | OUTPATIENT
Start: 2021-04-07 | End: 2021-04-13 | Stop reason: HOSPADM

## 2021-04-07 RX ADMIN — THIAMINE HCL TAB 100 MG 100 MG: 100 TAB at 17:41

## 2021-04-07 RX ADMIN — IPRATROPIUM BROMIDE AND ALBUTEROL SULFATE 1 AMPULE: .5; 3 SOLUTION RESPIRATORY (INHALATION) at 06:28

## 2021-04-07 RX ADMIN — BUDESONIDE 500 MCG: 0.5 INHALANT RESPIRATORY (INHALATION) at 06:27

## 2021-04-07 RX ADMIN — BENZONATATE 200 MG: 100 CAPSULE ORAL at 21:11

## 2021-04-07 RX ADMIN — SODIUM CHLORIDE: 9 INJECTION, SOLUTION INTRAVENOUS at 17:41

## 2021-04-07 RX ADMIN — DEXAMETHASONE 6 MG: 2 TABLET ORAL at 17:41

## 2021-04-07 RX ADMIN — BENZONATATE 200 MG: 100 CAPSULE ORAL at 16:14

## 2021-04-07 RX ADMIN — ASPIRIN 81 MG: 81 TABLET, FILM COATED ORAL at 07:41

## 2021-04-07 RX ADMIN — IPRATROPIUM BROMIDE AND ALBUTEROL SULFATE 1 AMPULE: .5; 3 SOLUTION RESPIRATORY (INHALATION) at 09:14

## 2021-04-07 RX ADMIN — PYRIDOXINE HCL TAB 50 MG 50 MG: 50 TAB at 17:41

## 2021-04-07 RX ADMIN — IBUPROFEN 800 MG: 800 TABLET, FILM COATED ORAL at 17:41

## 2021-04-07 RX ADMIN — AZITHROMYCIN MONOHYDRATE 500 MG: 250 TABLET ORAL at 07:42

## 2021-04-07 RX ADMIN — PREDNISONE 40 MG: 20 TABLET ORAL at 07:41

## 2021-04-07 RX ADMIN — ENOXAPARIN SODIUM 40 MG: 40 INJECTION SUBCUTANEOUS at 16:14

## 2021-04-07 RX ADMIN — ZINC SULFATE 220 MG (50 MG) CAPSULE 50 MG: CAPSULE at 17:41

## 2021-04-07 RX ADMIN — BENZONATATE 200 MG: 100 CAPSULE ORAL at 07:41

## 2021-04-07 RX ADMIN — ACETAMINOPHEN 500 MG: 500 TABLET ORAL at 16:14

## 2021-04-07 RX ADMIN — IBUPROFEN 800 MG: 800 TABLET, FILM COATED ORAL at 07:42

## 2021-04-07 RX ADMIN — SODIUM CHLORIDE: 9 INJECTION, SOLUTION INTRAVENOUS at 09:56

## 2021-04-07 RX ADMIN — GUAIFENESIN AND DEXTROMETHORPHAN 10 ML: 100; 10 SYRUP ORAL at 02:25

## 2021-04-07 RX ADMIN — OXYCODONE HYDROCHLORIDE AND ACETAMINOPHEN 1000 MG: 500 TABLET ORAL at 17:41

## 2021-04-07 ASSESSMENT — PAIN SCALES - GENERAL
PAINLEVEL_OUTOF10: 0
PAINLEVEL_OUTOF10: 3
PAINLEVEL_OUTOF10: 0
PAINLEVEL_OUTOF10: 0

## 2021-04-07 NOTE — PROGRESS NOTES
Room #: 5783/6873-65    Date: 2021       Patient Name: Vale Nassar  : 1950      MRN: [id number]     Patient unavailable for physical therapy treatment due to off floor at X-Ray. Will attempt PT treatment at a later time.       Chelle Parents, PT

## 2021-04-07 NOTE — PROGRESS NOTES
Physical Therapy    After talking with nurse, pt had a temp this AM, tunnel vision, still tachycardia and current on fluids. Will try treatment later in day to allow symptoms to calm down.      Jaya Hendrix, PTA #[id number]

## 2021-04-07 NOTE — PROGRESS NOTES
Room #: 2665/1288-96    Date: 2021       Patient Name: Reggie Lorenz  : 1950      MRN: [id number]     Patient unavailable for physical therapy treatment due to refusal due to fatigue and she reports doctor said take it easy today, requesting therapy to come back tomorrow.      Amparo Patel, PT

## 2021-04-07 NOTE — PROGRESS NOTES
Dr. Adam Ramsey paged to update on patient: temp this morning (101.5), came down to 99 after motrin given. BP now 80/58 in arm, 105/69 in leg. Pulse 105-120's  Patient cool, clammy, pale/gray. Had reported feeling like she was going to pass out and is also complaining of burning with urination.   Electronically signed by Gabrielle Lam RN on 4/7/2021 at 9:10 AM

## 2021-04-07 NOTE — PROGRESS NOTES
pneumonia the patient possibly has a has a viral bronchitis/pneumonitis versus bacterial bronchitis. 4/4/2021  Patient seen examined on telemetry floor. Patient denies any chest or abdominal pain. Patient still complains of increased shortness of breath with activity and only about 25% improved from admission. Patient O2 saturation with activity yesterday on room air was 87%. Temperature was 97.8 with a heart rate of 100 and sinus rhythm. Blood pressure 120/70 with O2 sat today on 2 L nasal cannula was 91-96% at rest.  Patient lung sounds are improved today. 4/5/2021  Patient seen examined on telemetry floor. Patient still feels short of breath with any activity. Patient denies any chest or abdominal pain. There is no nausea or vomiting. Patient still with moist nonproductive cough. BUN/creatinine 13/0.6 with potassium increased to 5.3 today. CBC is normal.  Temperature 97.9 with heart rate 72 with blood pressure currently 141/75. O2 sat 95% on 2 L nasal cannula. Patient with persistent coarse breath sounds with mild scattered occasional rhonchi. 4/6/2021  Patient seen examined on telemetry floor. Case discussed with patient's nurse at the bedside today. Patient's heart rate dramatically increased today with activity and with patient having the oxygen off for short period time. Patient's O2 sat did drop into the 80s on room air. Patient's heart rate improved with putting oxygen back on. Currently the patient denies any problem chest pain, abdominal pain, nausea/vomiting or unusual shortness of breath at rest.  Patient little bit more symptomatic with dyspnea with exertion today. Patient lung sounds have decreased coarse breath sounds without wheezing or rhonchi today. BUN/creatinine was 19/0.7. Temperature 98.3 with heart rate 83. Patient thought to be in atrial fibrillation by nursing today. Blood pressure 123/73 with O2 sat 97% on 4 L nasal cannula.     4/7/2021  Patient seen examined on telemetry floor. Patient had a bad morning because of complaints of fever/chills, increased cough, dizziness and weakness. Patient had temp of 101.5 early this morning. Blood pressure went as low as 80/58 but currently 105/69. O2 sat 95% on 2 L currently but was as low as 90% on 4 L nasal cannula earlier this morning. Patient lung sounds have increased congestion with expiratory wheezing with scattered crackles. Patient will have repeat chest x-ray PA and lateral along with stat blood cultures along with CBC and BMP now. Past Medical History:    Past Medical History:   Diagnosis Date    AAA (abdominal aortic aneurysm) ([facility] 2014    Arthritis     Blood circulation, collateral     thoracic outlet syndrome    CAD (coronary artery disease)     mi    [facility]     Emphysema with chronic bronchitis (HCC)     GERD (gastroesophageal reflux disease)     Hypothyroidism     [facility]     Substance abuse ([facility]     tobacco    Thoracic outlet syndrome     TIA (transient ischemic attack)     Unspecified cerebral artery occlusion with cerebral infarction      Past Surgical History:    Past Surgical History:   Procedure Laterality Date    APPENDECTOMY      OVARY REMOVAL      also tube on right removed    TONSILLECTOMY AND ADENOIDECTOMY      TUBAL LIGATION Bilateral     UTERINE SUSPENSION         Medications Prior to Admission:    @  Prior to Admission medications    Medication Sig Start Date End Date Taking?  Authorizing Provider   Calcium Carbonate Antacid (TUMS ULTRA 1000) 1000 MG CHEW Take 1,000 mg by mouth 3 times daily as needed (Reflux)   Yes Historical Provider, MD   ibuprofen (ADVIL;MOTRIN) 200 MG tablet Take 400 mg by mouth every 6 hours as needed for Pain   Yes Historical Provider, MD   Cholecalciferol (VITAMIN D3) 50 MCG (2000 UT) CAPS Take 2,000 Units by mouth daily   Yes Historical Provider, MD   aspirin 81 MG EC tablet Take 1 tablet by mouth daily 12/5/19  Yes Abby Pila, DO       Allergies:  Penicillins, Tetanus toxoids, Ciprofloxacin, Effexor [venlafaxine], Kiwi extract, Betadine [povidone iodine], Codeine, Demerol, Sulfa antibiotics, and Tape [place] tape]    Social History:   Social History     Socioeconomic History    Marital status:       Spouse name: Not on file    Number of children: Not on file    Years of education: Not on file    Highest education level: Not on file   Occupational History    Not on file   Social Needs    Financial resource strain: Not on file    Food insecurity     Worry: Not on file     Inability: Not on file    Transportation needs     Medical: Not on file     Non-medical: Not on file   Tobacco Use    Smoking status: Current Some Day Smoker     Packs/day: 1.00     Years: 51.00     Pack years: 51.00     Types: Cigarettes    Smokeless tobacco: Never Used    Tobacco comment: IN THE PROCESS OF QUITTING   Substance and Sexual Activity    Alcohol use: Not Currently     Comment: occasional    Drug use: No    Sexual activity: Not on file   Lifestyle    Physical activity     Days per week: Not on file     Minutes per session: Not on file    Stress: Not on file   Relationships    Social connections     Talks on phone: Not on file     Gets together: Not on file     Attends [employer] service: Not on file     Active member of club or organization: Not on file     Attends meetings of clubs or organizations: Not on file     Relationship status: Not on file    Intimate partner violence     Fear of current or ex partner: Not on file     Emotionally abused: Not on file     Physically abused: Not on file     Forced sexual activity: Not on file   Other Topics Concern    Not on file   Social History Narrative    Not on file       Family History:   Family History   Problem Relation Age of Onset    Liver Disease Father     Cancer Father     Heart Disease Father     Cancer Other breast    Cancer Mother         breast    Heart Disease Mother     Dementia Mother     Diabetes Mother        REVIEW OF SYSTEMS:    Gen: Patient denies any lightheadedness or dizziness. No LOC or syncope. No fevers or chills. HEENT: No earache, sore throat or nasal congestion. Resp: +cough with scant white productive sputum, no hemoptysis   Cardiac: Denies chest pain, SOB, diaphoresis or palpitations. GI: No nausea, vomiting, diarrhea or constipation. No melena or hematochezia. : No urinary complaints, dysuria, hematuria or frequency. MSK: No extremity weakness, paralysis or paresthesias. PHYSICAL EXAM:    Vitals:  /69   Pulse 104   Temp 99 °F (37.2 °C) (Oral)   Resp 18   Ht 5' 6\" (1.676 m)   Wt 148 lb (67.1 kg)   SpO2 95%   BMI 23.89 kg/m²     General:  This is a 73 y.o. yo female who is alert and oriented in NAD  HEENT:  Head is normocephalic and atraumatic, PERRLA, EOMI, mucus membranes moist with no pharyngeal erythema or exudate. Neck:  Supple with no carotid bruits, JVD or thyromegaly.   No cervical adenopathy  CV:  Regular rate and rhythm, no murmurs  Lungs: Coarse decreased breath sounds to auscultation bilaterally with mild scattered right lung crackles with no wheezes, rhonchi  Abdomen:  Soft, nontender, nondistended, bowel sounds present  Extremities:  No edema, peripheral pulses intact bilaterally  Neuro:  Cranial nerves II-XII grossly intact; motor and sensory function intact with no focal deficits  Skin:  No rashes, lesions or wounds    DATA:  CBC with Differential:    Lab Results   Component Value Date    WBC 9.1 04/05/2021    RBC 3.97 04/05/2021    HGB 12.4 04/05/2021    HCT 37.4 04/05/2021     04/05/2021    MCV 94.2 04/05/2021    MCH 31.2 04/05/2021    MCHC 33.2 04/05/2021    RDW 13.2 04/05/2021    LYMPHOPCT 4.3 04/05/2021    MONOPCT 2.6 04/05/2021    BASOPCT 0.0 04/05/2021    MONOSABS 0.27 04/05/2021    LYMPHSABS 0.36 04/05/2021    EOSABS 0.00 04/05/2021    BASOSABS 0.00 04/05/2021     CMP:    Lab Results   Component Value Date     04/06/2021    K 4.9 04/06/2021    K 3.4 04/02/2021     04/06/2021    CO2 26 04/06/2021    BUN 19 04/06/2021    CREATININE 0.7 04/06/2021    GFRAA >60 04/06/2021    LABGLOM >60 04/06/2021    GLUCOSE 98 04/06/2021    GLUCOSE 83 02/03/2011    PROT 6.4 04/03/2021    LABALBU 3.2 04/03/2021    LABALBU 4.1 02/03/2011    CALCIUM 8.5 04/06/2021    BILITOT <0.2 04/03/2021    ALKPHOS 97 04/03/2021    AST 27 04/03/2021    ALT 12 04/03/2021     Magnesium:    Lab Results   Component Value Date    MG 1.8 04/03/2021     Phosphorus:    Lab Results   Component Value Date    PHOS 4.0 07/02/2017     PT/INR:    Lab Results   Component Value Date    PROTIME 12.1 04/02/2021    INR 1.0 04/02/2021     Troponin:    Lab Results   Component Value Date    TROPONINI <0.01 04/02/2021     U/A:    Lab Results   Component Value Date    COLORU Straw 12/03/2019    PROTEINU Negative 12/03/2019    PHUR 7.0 12/03/2019    WBCUA 0-1 12/03/2019    RBCUA NONE 12/03/2019    BACTERIA NONE 12/03/2019    CLARITYU Clear 12/03/2019    SPECGRAV 1.010 12/03/2019    LEUKOCYTESUR TRACE 12/03/2019    UROBILINOGEN 0.2 12/03/2019    BILIRUBINUR Negative 12/03/2019    BLOODU TRACE 12/03/2019    GLUCOSEU Negative 12/03/2019     ABG:  No results found for: PH, PCO2, PO2, HCO3, BE, THGB, TCO2, O2SAT  HgBA1c:    Lab Results   Component Value Date    LABA1C 5.2 07/02/2017     FLP:    Lab Results   Component Value Date    TRIG 69 04/03/2021    HDL 50 04/03/2021    LDLCALC 58 04/03/2021    LABVLDL 14 04/03/2021     TSH:    Lab Results   Component Value Date    TSH 1.810 04/03/2021     IRON:  No results found for: IRON  LIPASE:    Lab Results   Component Value Date    LIPASE 39 01/03/2020       ASSESSMENT AND PLAN:      Patient Active Problem List    Diagnosis Date Noted    CAP (community acquired pneumonia) 04/10/2015     Priority: High    Syncope 04/10/2015     Priority: High  TIA (transient ischemic attack) 06/01/2013     Priority: High    COPD exacerbation ([facility] 04/02/2021    Closed compression fracture of lumbar vertebra ([facility] 07/01/2017    Thoracic outlet syndrome     [facility]    Anxiety 02/02/2011    CAD (coronary artery disease) 02/02/2011    Tobacco abuse 02/02/2011    Hypothyroidism 02/02/2011    GERD (gastroesophageal reflux disease) 02/02/2011    Dyslipidemia 02/02/2011     Impression:  1. Acute hypoxic respiratory failure  2. Acute exacerbation of COPD with current tobacco use  3. Possible right lower lobe pneumonia  4. History of coronary disease  5. Hypothyroidism  6. Hyperlipidemia  7. History of TIA    Plan:  Admit to telemetry floor  Home medication reviewed  Monitor heart rate, blood pressure, O2 saturation  O2 nasal cannula Lovenox 40 mg subcu daily  DuoNeb aerosols 4 times daily  Sputum and blood cultures-negative  Azithromycin 500 mg daily  Robitussin-DM 10 cc every 4 hours as needed  General diet  Up ad merle.     O2 saturation on room ai  Prednisone 40 mg p.o. daily  Tessalon Perles 200 mg p.o. 3 times daily    Chest x-ray PA and lateral now  Blood cultures x2 none  CBC, BMP now      Ricci Grider D.O.  4/7/2021  10:23 AM

## 2021-04-07 NOTE — PLAN OF CARE
Problem: Pain:  Goal: Pain level will decrease  Description: Pain level will decrease  4/7/2021 1027 by Linda Dsouza RN  Outcome: Met This Shift     Problem: Pain:  Goal: Control of acute pain  Description: Control of acute pain  4/7/2021 1027 by Linda Dsouza RN  Outcome: Met This Shift     Problem: Falls - Risk of:  Goal: Will remain free from falls  Description: Will remain free from falls  4/7/2021 1027 by Linda Dsouza RN  Outcome: Met This Shift     Problem: Falls - Risk of:  Goal: Absence of physical injury  Description: Absence of physical injury  4/7/2021 1027 by Linda Dsouza RN  Outcome: Met This Shift     Problem: Skin Integrity:  Goal: Will show no infection signs and symptoms  Description: Will show no infection signs and symptoms  4/7/2021 1027 by Linda Dsouza RN  Outcome: Met This Shift     Problem: Skin Integrity:  Goal: Absence of new skin breakdown  Description: Absence of new skin breakdown  4/7/2021 1027 by Linda Dsouza RN  Outcome: Met This Shift

## 2021-04-07 NOTE — PROGRESS NOTES
P Quality Flow/Interdisciplinary Rounds Progress Note        Quality Flow Rounds held on April 7, 2021    Disciplines Attending:  Bedside Nurse, ,  and Nursing Unit Leadership    Emily Antonio was admitted on 4/2/2021  1:40 PM    Anticipated Discharge Date:  Expected Discharge Date: 04/05/21    Disposition:    Jesus Score:  Jesus Scale Score: 20    Readmission Risk              Risk of Unplanned Readmission:        8           Discussed patient goal for the day, patient clinical progression, and barriers to discharge.   The following Goal(s) of the Day/Commitment(s) have been identified:  Activity progression, PT/OT, needs qualified for home O2, temp this AM, [facility]  April 7, 2021

## 2021-04-08 LAB
ALBUMIN SERPL-MCNC: 3.1 G/DL (ref 3.5–5.2)
ALP BLD-CCNC: 96 U/L (ref 35–104)
ALT SERPL-CCNC: 32 U/L (ref 0–32)
ANION GAP SERPL CALCULATED.3IONS-SCNC: 12 MMOL/L (ref 7–16)
AST SERPL-CCNC: 42 U/L (ref 0–31)
BASOPHILS ABSOLUTE: 0.01 E9/L (ref 0–0.2)
BASOPHILS RELATIVE PERCENT: 0.1 % (ref 0–2)
BILIRUB SERPL-MCNC: <0.2 MG/DL (ref 0–1.2)
BUN BLDV-MCNC: 20 MG/DL (ref 8–23)
CALCIUM SERPL-MCNC: 7.8 MG/DL (ref 8.6–10.2)
CHLORIDE BLD-SCNC: 104 MMOL/L (ref 98–107)
CO2: 22 MMOL/L (ref 22–29)
CREAT SERPL-MCNC: 0.6 MG/DL (ref 0.5–1)
EOSINOPHILS ABSOLUTE: 0 E9/L (ref 0.05–0.5)
EOSINOPHILS RELATIVE PERCENT: 0 % (ref 0–6)
GFR AFRICAN AMERICAN: >60
GFR NON-AFRICAN AMERICAN: >60 ML/MIN/1.73
GLUCOSE BLD-MCNC: 175 MG/DL (ref 74–99)
HCT VFR BLD CALC: 38.5 % (ref 34–48)
HEMOGLOBIN: 12.8 G/DL (ref 11.5–15.5)
IMMATURE GRANULOCYTES #: 0.11 E9/L
IMMATURE GRANULOCYTES %: 1.3 % (ref 0–5)
LYMPHOCYTES ABSOLUTE: 0.7 E9/L (ref 1.5–4)
LYMPHOCYTES RELATIVE PERCENT: 8.1 % (ref 20–42)
MCH RBC QN AUTO: 31.1 PG (ref 26–35)
MCHC RBC AUTO-ENTMCNC: 33.2 % (ref 32–34.5)
MCV RBC AUTO: 93.4 FL (ref 80–99.9)
MONOCYTES ABSOLUTE: 0.35 E9/L (ref 0.1–0.95)
MONOCYTES RELATIVE PERCENT: 4.1 % (ref 2–12)
NEUTROPHILS ABSOLUTE: 7.43 E9/L (ref 1.8–7.3)
NEUTROPHILS RELATIVE PERCENT: 86.4 % (ref 43–80)
PDW BLD-RTO: 13.2 FL (ref 11.5–15)
PLATELET # BLD: 324 E9/L (ref 130–450)
PMV BLD AUTO: 9.7 FL (ref 7–12)
POTASSIUM SERPL-SCNC: 3.7 MMOL/L (ref 3.5–5)
RBC # BLD: 4.12 E12/L (ref 3.5–5.5)
SODIUM BLD-SCNC: 138 MMOL/L (ref 132–146)
TOTAL PROTEIN: 6.4 G/DL (ref 6.4–8.3)
WBC # BLD: 8.6 E9/L (ref 4.5–11.5)

## 2021-04-08 PROCEDURE — 2580000003 HC RX 258: Performed by: INTERNAL MEDICINE

## 2021-04-08 PROCEDURE — 1200000000 HC SEMI PRIVATE

## 2021-04-08 PROCEDURE — 6370000000 HC RX 637 (ALT 250 FOR IP): Performed by: INTERNAL MEDICINE

## 2021-04-08 PROCEDURE — 2500000003 HC RX 250 WO HCPCS: Performed by: INTERNAL MEDICINE

## 2021-04-08 PROCEDURE — 85025 COMPLETE CBC W/AUTO DIFF WBC: CPT

## 2021-04-08 PROCEDURE — 97110 THERAPEUTIC EXERCISES: CPT

## 2021-04-08 PROCEDURE — 94640 AIRWAY INHALATION TREATMENT: CPT

## 2021-04-08 PROCEDURE — 2700000000 HC OXYGEN THERAPY PER DAY

## 2021-04-08 PROCEDURE — 80053 COMPREHEN METABOLIC PANEL: CPT

## 2021-04-08 PROCEDURE — 6360000002 HC RX W HCPCS: Performed by: INTERNAL MEDICINE

## 2021-04-08 PROCEDURE — 36415 COLL VENOUS BLD VENIPUNCTURE: CPT

## 2021-04-08 PROCEDURE — 97116 GAIT TRAINING THERAPY: CPT

## 2021-04-08 RX ORDER — 0.9 % SODIUM CHLORIDE 0.9 %
30 INTRAVENOUS SOLUTION INTRAVENOUS PRN
Status: DISCONTINUED | OUTPATIENT
Start: 2021-04-08 | End: 2021-04-13 | Stop reason: HOSPADM

## 2021-04-08 RX ADMIN — ACETAMINOPHEN 500 MG: 500 TABLET ORAL at 06:13

## 2021-04-08 RX ADMIN — BENZONATATE 200 MG: 100 CAPSULE ORAL at 21:38

## 2021-04-08 RX ADMIN — ACETAMINOPHEN 500 MG: 500 TABLET ORAL at 22:58

## 2021-04-08 RX ADMIN — BENZONATATE 200 MG: 100 CAPSULE ORAL at 09:16

## 2021-04-08 RX ADMIN — THIAMINE HCL TAB 100 MG 100 MG: 100 TAB at 09:16

## 2021-04-08 RX ADMIN — IPRATROPIUM BROMIDE AND ALBUTEROL 1 PUFF: 20; 100 SPRAY, METERED RESPIRATORY (INHALATION) at 06:40

## 2021-04-08 RX ADMIN — IPRATROPIUM BROMIDE AND ALBUTEROL 1 PUFF: 20; 100 SPRAY, METERED RESPIRATORY (INHALATION) at 16:34

## 2021-04-08 RX ADMIN — GUAIFENESIN AND DEXTROMETHORPHAN 10 ML: 100; 10 SYRUP ORAL at 00:38

## 2021-04-08 RX ADMIN — GUAIFENESIN AND DEXTROMETHORPHAN 10 ML: 100; 10 SYRUP ORAL at 06:13

## 2021-04-08 RX ADMIN — REMDESIVIR 200 MG: 100 INJECTION, POWDER, LYOPHILIZED, FOR SOLUTION INTRAVENOUS at 15:11

## 2021-04-08 RX ADMIN — GUAIFENESIN AND DEXTROMETHORPHAN 10 ML: 100; 10 SYRUP ORAL at 22:58

## 2021-04-08 RX ADMIN — AZITHROMYCIN MONOHYDRATE 500 MG: 250 TABLET ORAL at 09:16

## 2021-04-08 RX ADMIN — OXYCODONE HYDROCHLORIDE AND ACETAMINOPHEN 1000 MG: 500 TABLET ORAL at 09:17

## 2021-04-08 RX ADMIN — IBUPROFEN 800 MG: 800 TABLET, FILM COATED ORAL at 15:10

## 2021-04-08 RX ADMIN — SODIUM CHLORIDE: 9 INJECTION, SOLUTION INTRAVENOUS at 06:13

## 2021-04-08 RX ADMIN — ENOXAPARIN SODIUM 40 MG: 40 INJECTION SUBCUTANEOUS at 21:38

## 2021-04-08 RX ADMIN — BUDESONIDE AND FORMOTEROL FUMARATE DIHYDRATE 2 PUFF: 160; 4.5 AEROSOL RESPIRATORY (INHALATION) at 16:35

## 2021-04-08 RX ADMIN — PYRIDOXINE HCL TAB 50 MG 50 MG: 50 TAB at 09:16

## 2021-04-08 RX ADMIN — IPRATROPIUM BROMIDE AND ALBUTEROL 1 PUFF: 20; 100 SPRAY, METERED RESPIRATORY (INHALATION) at 21:29

## 2021-04-08 RX ADMIN — DEXAMETHASONE 6 MG: 2 TABLET ORAL at 09:16

## 2021-04-08 RX ADMIN — BENZONATATE 200 MG: 100 CAPSULE ORAL at 15:11

## 2021-04-08 RX ADMIN — ZINC SULFATE 220 MG (50 MG) CAPSULE 50 MG: CAPSULE at 09:16

## 2021-04-08 RX ADMIN — ASPIRIN 81 MG: 81 TABLET, FILM COATED ORAL at 09:17

## 2021-04-08 RX ADMIN — IPRATROPIUM BROMIDE AND ALBUTEROL 1 PUFF: 20; 100 SPRAY, METERED RESPIRATORY (INHALATION) at 11:32

## 2021-04-08 RX ADMIN — BUDESONIDE AND FORMOTEROL FUMARATE DIHYDRATE 2 PUFF: 160; 4.5 AEROSOL RESPIRATORY (INHALATION) at 06:41

## 2021-04-08 ASSESSMENT — PAIN SCALES - GENERAL
PAINLEVEL_OUTOF10: 8
PAINLEVEL_OUTOF10: 0
PAINLEVEL_OUTOF10: 5
PAINLEVEL_OUTOF10: 2

## 2021-04-08 ASSESSMENT — PAIN DESCRIPTION - PROGRESSION: CLINICAL_PROGRESSION: GRADUALLY IMPROVING

## 2021-04-08 NOTE — CARE COORDINATION
MORAIMA Note: 4/78/2021 at 3:04pm: COVID POSITIVE - test done on 4/7/2021. CM talked to the patient on the phone in her room for transition of care. States she lives with her son and was independent with her ADL's prior to coming into the hospital. Discussed PT recommendations for [facility] with Mrs. Jonnie Galindo, but she declined and felt she would not need it. Currently on 4L NC - no home O2. Will need to watch for home oxygen. States her plan is to go home when discharged.  Edilma Sanabria RN

## 2021-04-08 NOTE — ACP (ADVANCE CARE PLANNING)
Advance Care Planning     Advance Care Planning Activator (Inpatient)  Conversation Note      Date of ACP Conversation: 4/8/2021  Conversation Conducted with:Patient  ACP Activator: [address] Decision Maker:     Current Designated Health Care Decision Maker: Patient states that she does not have any primary or secondary health care decision makers. Care Preferences    Ventilation: \"If you were in your present state of health and suddenly became very ill and were unable to breathe on your own, what would your preference be about the use of a ventilator (breathing machine) if it were available to you? \"      Would the patient desire the use of ventilator (breathing machine)?: Patient states \" I would have to think about that\"    \"If your health worsens and it becomes clear that your chance of recovery is unlikely, what would your preference be about the use of a ventilator (breathing machine) if it were available to you? \"     Would the patient desire the use of ventilator (breathing machine)?: No      Resuscitation  \"CPR works best to restart the heart when there is a sudden event, like a heart attack, in someone who is otherwise healthy. Unfortunately, CPR does not typically restart the heart for people who have serious health conditions or who are very sick. \"    \"In the event your heart stopped as a result of an underlying serious health condition, would you want attempts to be made to restart your heart (answer \"yes\" for attempt to resuscitate) or would you prefer a natural death (answer \"no\" for do not attempt to resuscitate)? No    Patient's nurse Marjorie Shah informed of the answers to the above questions     [] Yes   [x] No   Educated Patient / Davy Mena regarding differences between Advance Directives and portable DNR orders.     Length of ACP Conversation in minutes:      Conversation Outcomes:  [x] ACP discussion completed  [] Existing advance directive reviewed with patient; no changes to patient's previously recorded wishes  [] New Advance Directive completed  [] Portable Do Not Rescitate prepared for Provider review and signature  [] POLST/POST/MOLST/MOST prepared for Provider review and signature      Follow-up plan:    [] Schedule follow-up conversation to continue planning  [] Referred individual to Provider for additional questions/concerns   [] Advised patient/agent/surrogate to review completed ACP document and update if needed with changes in condition, patient preferences or care setting    [x] This note routed to one or more involved healthcare providers

## 2021-04-08 NOTE — PROGRESS NOTES
SUBJECTIVE:    Social history: Patient lives with son  And his wife in a ranch home  with 1+1+1  to enter with Rail  Tub shower grab bars    Equipment owned: [employer], [employer] chair and [employer],      Jeremy Quintero 108 cleared patient for PT treatment. pt proning, cleared for activity     OBJECTIVE;   Initial Evaluation  Date: 4/5/2021 Treatment Date:    4/8/2021   Short Term/ Long Term   Goals   Was pt agreeable to Eval/treatment? Yes  yes To be met in 4 days   Pain level   0/10   0/10    Bed Mobility    Rolling: Independent   Supine to sit: Supervision    Sit to supine: Supervision    Scooting: Supervision   Rolling: Supervision    Supine to sit: Supervision    Sit to supine: Supervision    Scooting: Supervision     Rolling: Independent   Supine to sit: Independent   Sit to supine: Independent   Scooting: Independent    Transfers Sit to stand: Supervision   Sit to stand: Supervision  cuing for safety and pace     Sit to stand: Independent    Ambulation    2x75 feet using  IV pole with Supervision    , assistance with O2 line 2 x 15  feet using  hand held assist with Minimal assist of 1   cuing for breathing and safety throughout.   Pulse ox 93% when standing, returned to sit at side of bed 88%, cues for breathing technique   150 feet using  no device with Independent    Stair negotiation: ascended and descended   Not assessed       ROM Within functional limits      Strength BUE:  refer to OT eval  RLE:  4+/5  LLE:  4+/5  Increase strength in affected mm groups by 1/3 grade   Balance Sitting EOB:  good   Dynamic Standing:  fair + with IV pole Sitting EOB: good  Dynamic Standing: fair + with hand held assist     Dynamic Standing: good      Patient is Alert & Oriented x person, place, time and situation and follows directions   Sensation:  Patient  denies numbness and tingling bilateral lower extremities    Edema:  no   Endurance: fair     Vitals: 4 liters nasal cannula   Blood Pressure at rest

## 2021-04-08 NOTE — PLAN OF CARE
Problem: Pain:  Goal: Pain level will decrease  Description: Pain level will decrease  Outcome: Met This Shift     Problem: Pain:  Goal: Control of acute pain  Description: Control of acute pain  Outcome: Met This Shift     Problem: Pain:  Goal: Control of chronic pain  Description: Control of chronic pain  Outcome: Met This Shift     Problem: Falls - Risk of:  Goal: Will remain free from falls  Description: Will remain free from falls  Outcome: Met This Shift     Problem: Falls - Risk of:  Goal: Absence of physical injury  Description: Absence of physical injury  Outcome: Met This Shift     Problem: Skin Integrity:  Goal: Will show no infection signs and symptoms  Description: Will show no infection signs and symptoms  Outcome: Met This Shift     Problem: Skin Integrity:  Goal: Absence of new skin breakdown  Description: Absence of new skin breakdown  Outcome: Met This Shift     Problem: Airway Clearance - Ineffective  Goal: Achieve or maintain patent airway  Outcome: Met This Shift     Problem: Gas Exchange - Impaired  Goal: Absence of hypoxia  Outcome: Ongoing     Problem: Gas Exchange - Impaired  Goal: Promote optimal lung function  Outcome: Ongoing     Problem: Breathing Pattern - Ineffective  Goal: Ability to achieve and maintain a regular respiratory rate  Outcome: Ongoing     Problem: Body Temperature -  Risk of, Imbalanced  Goal: Ability to maintain a body temperature within defined limits  Outcome: Ongoing     Problem: Body Temperature -  Risk of, Imbalanced  Goal: Will regain or maintain usual level of consciousness  Outcome: Ongoing     Problem:  Body Temperature -  Risk of, Imbalanced  Goal: Complications related to the disease process, condition or treatment will be avoided or minimized  Outcome: Ongoing     Problem: Isolation Precautions - Risk of Spread of Infection  Goal: Prevent transmission of infection  Outcome: Ongoing     Problem: Nutrition Deficits  Goal: Optimize nutritional status  Outcome: Ongoing     Problem: Risk for Fluid Volume Deficit  Goal: Maintain normal heart rhythm  Outcome: Ongoing     Problem: Risk for Fluid Volume Deficit  Goal: Maintain absence of muscle cramping  Outcome: Ongoing     Problem: Risk for Fluid Volume Deficit  Goal: Maintain normal serum potassium, sodium, calcium, phosphorus, and pH  Outcome: Ongoing     Problem: Loneliness or Risk for Loneliness  Goal: Demonstrate positive use of time alone when socialization is not possible  Outcome: Ongoing     Problem: Fatigue  Goal: Verbalize increase energy and improved vitality  Outcome: Ongoing     Problem: Patient Education: Go to Patient Education Activity  Goal: Patient/Family Education  Outcome: Ongoing

## 2021-04-08 NOTE — PROGRESS NOTES
Department of Internal Medicine        CHIEF COMPLAINT: Shortness of breath, fever/chills body aches and nonproductive cough. Reason for Admission:   Required pneumonia right lower lobe    HISTORY OF PRESENT ILLNESS:      The patient is a 73 y.o. female who presents with increased shortness of breath, fever/chills, scant productive cough and generalized body aches. Patient denied chest pain today but did have some chest tightness yesterday that resolved. There is no associated nausea/vomiting or diarrhea. Patient has a scant productive white sputum off and on. Patient also complains of cephalgia. Patient's had increased shortness of breath with activity. Symptoms started about 5 days ago. Patient states she has not received her Covid vaccine with the COVID-19 antigen being negative. WBC is 3.2 with potassium 3.4. Temperature is normal 98.1 with a heart rate of 100 blood pressure 117/56. O2 sat was 95% on 2 L. Patient was ambulated short distance in the ED and the O2 sat did go down as low as 87% on room air. Chest x-ray show pronounced emphysema along with right greater than left base atelectasis or infiltrates. Patient was mated the hospital because of acute hypoxic respiratory failure and possibly underlying viral versus bacterial pneumonia in right lung. 4/3/2021  Patient seen examined on telemetry floor. Patient feels little bit better today. Patient still has a harsh nonproductive cough. Lung sounds still have some mild expiratory wheezing and coarse breath sounds. Patient still short of breath with activity. BUN/creatinine is 12/0.7 with potassium improved to 4.2. WBC still low at 3.1 with hemoglobin 1.6. Temperature ranges 98.699.1 with heart rate 94. Blood pressure ranges 90/50 5113/55. O2 sat 94% on 2 L nasal cannula at rest.  CT the chest showed extensive emphysematous changes with scarring or atelectasis in the bases.   With mild leukopenia and a CAT scan showing no definitive pneumonia the patient possibly has a has a viral bronchitis/pneumonitis versus bacterial bronchitis. 4/4/2021  Patient seen examined on telemetry floor. Patient denies any chest or abdominal pain. Patient still complains of increased shortness of breath with activity and only about 25% improved from admission. Patient O2 saturation with activity yesterday on room air was 87%. Temperature was 97.8 with a heart rate of 100 and sinus rhythm. Blood pressure 120/70 with O2 sat today on 2 L nasal cannula was 91-96% at rest.  Patient lung sounds are improved today. 4/5/2021  Patient seen examined on telemetry floor. Patient still feels short of breath with any activity. Patient denies any chest or abdominal pain. There is no nausea or vomiting. Patient still with moist nonproductive cough. BUN/creatinine 13/0.6 with potassium increased to 5.3 today. CBC is normal.  Temperature 97.9 with heart rate 72 with blood pressure currently 141/75. O2 sat 95% on 2 L nasal cannula. Patient with persistent coarse breath sounds with mild scattered occasional rhonchi. 4/6/2021  Patient seen examined on telemetry floor. Case discussed with patient's nurse at the bedside today. Patient's heart rate dramatically increased today with activity and with patient having the oxygen off for short period time. Patient's O2 sat did drop into the 80s on room air. Patient's heart rate improved with putting oxygen back on. Currently the patient denies any problem chest pain, abdominal pain, nausea/vomiting or unusual shortness of breath at rest.  Patient little bit more symptomatic with dyspnea with exertion today. Patient lung sounds have decreased coarse breath sounds without wheezing or rhonchi today. BUN/creatinine was 19/0.7. Temperature 98.3 with heart rate 83. Patient thought to be in atrial fibrillation by nursing today. Blood pressure 123/73 with O2 sat 97% on 4 L nasal cannula.     4/7/2021  Patient seen examined on telemetry floor. Patient had a bad morning because of complaints of fever/chills, increased cough, dizziness and weakness. Patient had temp of 101.5 early this morning. Blood pressure went as low as 80/58 but currently 105/69. O2 sat 95% on 2 L currently but was as low as 90% on 4 L nasal cannula earlier this morning. Patient lung sounds have increased congestion with expiratory wheezing with scattered crackles. Patient will have repeat chest x-ray PA and lateral along with stat blood cultures along with CBC and BMP now. 4/8/2021  Patient seen examined on [facility]. Patient feels fairly good today. Patient currently on 4-5 L nasal cannula and is currently proning. Patient denies any chest or abdominal pain. Patient does have shortness of breath with activity. Patient COVID-19 antigen result was positive from yesterday early afternoon. Temperature 97.3 with heart rate of 76. Blood pressure 115/64. O2 sat 90% on 4 L nasal cannula at rest.  Patient switched over to Combivent and Symbicort inhaler along with consulting pharmacist for remdesivir and starting p.o. Decadron. Patient started on oral vitamin C, zinc, B6 and thiamine. 7 serology every 2 hours while awake.     Past Medical History:    Past Medical History:   Diagnosis Date    AAA (abdominal aortic aneurysm) ([facility] [address] 2014    Arthritis     Blood circulation, collateral     thoracic outlet syndrome    CAD (coronary artery disease)     mi    COPD (chronic obstructive pulmonary disease) (HCC)     Emphysema with chronic bronchitis (HCC)     GERD (gastroesophageal reflux disease)     Hypothyroidism     MI (myocardial infarction) ([address]     Substance abuse ([facility] [address]     tobacco    Thoracic outlet syndrome     TIA (transient ischemic attack)     Unspecified cerebral artery occlusion with cerebral infarction      Past Surgical History:    Past Surgical History:   Procedure Laterality Date    APPENDECTOMY      OVARY REMOVAL      also tube on right removed    TONSILLECTOMY AND ADENOIDECTOMY      TUBAL LIGATION Bilateral     UTERINE SUSPENSION         Medications Prior to Admission:    @  Prior to Admission medications    Medication Sig Start Date End Date Taking? Authorizing Provider   Calcium Carbonate Antacid (TUMS ULTRA 1000) 1000 MG CHEW Take 1,000 mg by mouth 3 times daily as needed (Reflux)   Yes Historical Provider, MD   ibuprofen (ADVIL;MOTRIN) 200 MG tablet Take 400 mg by mouth every 6 hours as needed for Pain   Yes Historical Provider, MD   Cholecalciferol (VITAMIN D3) 50 MCG (2000 UT) CAPS Take 2,000 Units by mouth daily   Yes Historical Provider, MD   aspirin 81 MG EC tablet Take 1 tablet by mouth daily 12/5/19  Yes Gamal Cai,        Allergies:  Penicillins, Tetanus toxoids, Ciprofloxacin, Effexor [venlafaxine], Kiwi extract, Betadine [povidone iodine], Codeine, Demerol, Sulfa antibiotics, and Tape [place] tape]    Social History:   Social History     Socioeconomic History    Marital status:       Spouse name: Not on file    Number of children: Not on file    Years of education: Not on file    Highest education level: Not on file   Occupational History    Not on file   Social Needs    Financial resource strain: Not on file    Food insecurity     Worry: Not on file     Inability: Not on file    Transportation needs     Medical: Not on file     Non-medical: Not on file   Tobacco Use    Smoking status: Current Some Day Smoker     Packs/day: 1.00     Years: 51.00     Pack years: 51.00     Types: Cigarettes    Smokeless tobacco: Never Used    Tobacco comment: IN THE PROCESS OF QUITTING   Substance and Sexual Activity    Alcohol use: Not Currently     Comment: occasional    Drug use: No    Sexual activity: Not on file   Lifestyle    Physical activity     Days per week: Not on file     Minutes per session: Not on file    Stress: Not on file   Relationships    Social connections     Talks on phone: Not on file Gets together: Not on file     Attends Sikhism service: Not on file     Active member of club or organization: Not on file     Attends meetings of clubs or organizations: Not on file     Relationship status: Not on file    Intimate partner violence     Fear of current or ex partner: Not on file     Emotionally abused: Not on file     Physically abused: Not on file     Forced sexual activity: Not on file   Other Topics Concern    Not on file   Social History Narrative    Not on file       Family History:   Family History   Problem Relation Age of Onset    Liver Disease Father     Cancer Father     Heart Disease Father     Cancer Other         breast    Cancer Mother         breast    Heart Disease Mother     Dementia Mother     Diabetes Mother        REVIEW OF SYSTEMS:    Gen: Patient denies any lightheadedness or dizziness. No LOC or syncope. No fevers or chills. HEENT: No earache, sore throat or nasal congestion. Resp: +cough with scant white productive sputum, no hemoptysis   Cardiac: Denies chest pain, SOB, diaphoresis or palpitations. GI: No nausea, vomiting, diarrhea or constipation. No melena or hematochezia. : No urinary complaints, dysuria, hematuria or frequency. MSK: No extremity weakness, paralysis or paresthesias. PHYSICAL EXAM:    Vitals:  /64   Pulse 76   Temp 97 °F (36.1 °C) (Oral)   Resp 18   Ht 5' 6\" (1.676 m)   Wt 148 lb (67.1 kg)   SpO2 90%   BMI 23.89 kg/m²     General:  This is a 73 y.o. yo female who is alert and oriented in NAD  HEENT:  Head is normocephalic and atraumatic, PERRLA, EOMI, mucus membranes moist with no pharyngeal erythema or exudate. Neck:  Supple with no carotid bruits, JVD or thyromegaly.   No cervical adenopathy  CV:  Regular rate and rhythm, no murmurs  Lungs: Coarse decreased breath sounds to auscultation bilaterally with mild scattered right lung crackles with no wheezes, rhonchi  Abdomen:  Soft, nontender, nondistended, bowel sounds present  Extremities:  No edema, peripheral pulses intact bilaterally  Neuro:  Cranial nerves II-XII grossly intact; motor and sensory function intact with no focal deficits  Skin:  No rashes, lesions or wounds    DATA:  CBC with Differential:    Lab Results   Component Value Date    WBC 7.0 04/07/2021    RBC 3.97 04/07/2021    HGB 12.3 04/07/2021    HCT 36.1 04/07/2021     04/07/2021    MCV 90.9 04/07/2021    MCH 31.0 04/07/2021    MCHC 34.1 04/07/2021    RDW 13.2 04/07/2021    LYMPHOPCT 4.3 04/07/2021    MONOPCT 0.9 04/07/2021    MYELOPCT 2.6 04/07/2021    BASOPCT 0.3 04/07/2021    MONOSABS 0.07 04/07/2021    LYMPHSABS 0.28 04/07/2021    EOSABS 0.00 04/07/2021    BASOSABS 0.00 04/07/2021     CMP:    Lab Results   Component Value Date     04/07/2021    K 3.8 04/07/2021    K 3.4 04/02/2021    CL 99 04/07/2021    CO2 24 04/07/2021    BUN 19 04/07/2021    CREATININE 0.8 04/07/2021    GFRAA >60 04/07/2021    LABGLOM >60 04/07/2021    GLUCOSE 119 04/07/2021    GLUCOSE 83 02/03/2011    PROT 6.4 04/03/2021    LABALBU 3.2 04/03/2021    LABALBU 4.1 02/03/2011    CALCIUM 7.7 04/07/2021    BILITOT <0.2 04/03/2021    ALKPHOS 97 04/03/2021    AST 27 04/03/2021    ALT 12 04/03/2021     Magnesium:    Lab Results   Component Value Date    MG 1.8 04/03/2021     Phosphorus:    Lab Results   Component Value Date    PHOS 4.0 07/02/2017     PT/INR:    Lab Results   Component Value Date    PROTIME 12.1 04/02/2021    INR 1.0 04/02/2021     Troponin:    Lab Results   Component Value Date    TROPONINI <0.01 04/02/2021     U/A:    Lab Results   Component Value Date    COLORU Straw 12/03/2019    PROTEINU Negative 12/03/2019    PHUR 7.0 12/03/2019    WBCUA 0-1 12/03/2019    RBCUA NONE 12/03/2019    BACTERIA NONE 12/03/2019    CLARITYU Clear 12/03/2019    SPECGRAV 1.010 12/03/2019    LEUKOCYTESUR TRACE 12/03/2019    UROBILINOGEN 0.2 12/03/2019    BILIRUBINUR Negative 12/03/2019    BLOODU TRACE 12/03/2019    GLUCOSEU Negative 12/03/2019     ABG:  No results found for: PH, PCO2, PO2, HCO3, BE, THGB, TCO2, O2SAT  HgBA1c:    Lab Results   Component Value Date    LABA1C 5.2 07/02/2017     FLP:    Lab Results   Component Value Date    TRIG 69 04/03/2021    HDL 50 04/03/2021    LDLCALC 58 04/03/2021    LABVLDL 14 04/03/2021     TSH:    Lab Results   Component Value Date    TSH 1.810 04/03/2021     IRON:  No results found for: IRON  LIPASE:    Lab Results   Component Value Date    LIPASE 39 01/03/2020       ASSESSMENT AND PLAN:      Patient Active Problem List    Diagnosis Date Noted    CAP (community acquired pneumonia) 04/10/2015     Priority: High    Syncope 04/10/2015     Priority: High    TIA (transient ischemic attack) 06/01/2013     Priority: High    COPD exacerbation ([facility] [address] 04/02/2021    Closed compression fracture of lumbar vertebra ([facility] [address] 07/01/2017    Thoracic outlet syndrome     COPD (chronic obstructive pulmonary disease) ([facility] [address] 02/02/2011    Anxiety 02/02/2011    CAD (coronary artery disease) 02/02/2011    Tobacco abuse 02/02/2011    Hypothyroidism 02/02/2011    GERD (gastroesophageal reflux disease) 02/02/2011    Dyslipidemia 02/02/2011     Impression:  1. Acute hypoxic respiratory failure  2. Acute exacerbation of COPD with current tobacco use  3. Possible right lower lobe pneumonia  4. Acute COVID-19 respiratory infection  5. Hypothyroidism  6. Hyperlipidemia  7. History of TIA  8. History of coronary disease    Plan:  Admit to telemetry floor  Home medication reviewed  Monitor heart rate, blood pressure, O2 saturation  O2 nasal cannula   Lovenox 40 mg subcu twice daily  DuoNeb aerosols 4 times daily  Sputum and blood cultures-negative  Azithromycin 500 mg daily  Robitussin-DM 10 cc every 4 hours as needed  General diet  Up ad merle.   Tessalon Perles 200 mg p.o. 3 times daily    Chest x-ray PA and lateral -increased opacities lung bases  Blood cultures-negative  Transfer to [facility]  Remdesivir IV piggyback x5 days  Decadron 6 mg p.o. daily  Vitamin C, zinc, vitamin B6, thiamine 1 mg daily  Incentive spirometry every 2 hours while awake  Proning as much as possible  Combivent inhaler 2 puffs 4 times daily  Symbicort inhaler 2 puffs twice daily    Bonifacio Whitaker D.O.  4/8/2021  10:45 AM

## 2021-04-08 NOTE — PROGRESS NOTES
Patient educated and provided reading material for COVID. Explained proning and incentive spirometer use. Patient encouraged and receptive to learning and using. Patient observed side lying and using spirometer throughout night when awake.  Patient states will get up to chair for meals and try proning during the day

## 2021-04-09 LAB
ALBUMIN SERPL-MCNC: 2.7 G/DL (ref 3.5–5.2)
ALP BLD-CCNC: 87 U/L (ref 35–104)
ALT SERPL-CCNC: 37 U/L (ref 0–32)
ANION GAP SERPL CALCULATED.3IONS-SCNC: 9 MMOL/L (ref 7–16)
AST SERPL-CCNC: 51 U/L (ref 0–31)
BILIRUB SERPL-MCNC: <0.2 MG/DL (ref 0–1.2)
BUN BLDV-MCNC: 20 MG/DL (ref 8–23)
CALCIUM SERPL-MCNC: 7.7 MG/DL (ref 8.6–10.2)
CHLORIDE BLD-SCNC: 112 MMOL/L (ref 98–107)
CO2: 21 MMOL/L (ref 22–29)
CREAT SERPL-MCNC: 0.6 MG/DL (ref 0.5–1)
D DIMER: 2211 NG/ML DDU
FIBRINOGEN: 468 MG/DL (ref 225–540)
GFR AFRICAN AMERICAN: >60
GFR NON-AFRICAN AMERICAN: >60 ML/MIN/1.73
GLUCOSE BLD-MCNC: 89 MG/DL (ref 74–99)
LACTATE DEHYDROGENASE: 418 U/L (ref 135–214)
POTASSIUM SERPL-SCNC: 4.4 MMOL/L (ref 3.5–5)
SODIUM BLD-SCNC: 142 MMOL/L (ref 132–146)
TOTAL PROTEIN: 5.7 G/DL (ref 6.4–8.3)

## 2021-04-09 PROCEDURE — 2580000003 HC RX 258: Performed by: INTERNAL MEDICINE

## 2021-04-09 PROCEDURE — 97116 GAIT TRAINING THERAPY: CPT

## 2021-04-09 PROCEDURE — 85378 FIBRIN DEGRADE SEMIQUANT: CPT

## 2021-04-09 PROCEDURE — 97110 THERAPEUTIC EXERCISES: CPT

## 2021-04-09 PROCEDURE — 80053 COMPREHEN METABOLIC PANEL: CPT

## 2021-04-09 PROCEDURE — 6370000000 HC RX 637 (ALT 250 FOR IP): Performed by: INTERNAL MEDICINE

## 2021-04-09 PROCEDURE — 2500000003 HC RX 250 WO HCPCS: Performed by: INTERNAL MEDICINE

## 2021-04-09 PROCEDURE — 36415 COLL VENOUS BLD VENIPUNCTURE: CPT

## 2021-04-09 PROCEDURE — 2700000000 HC OXYGEN THERAPY PER DAY

## 2021-04-09 PROCEDURE — 83615 LACTATE (LD) (LDH) ENZYME: CPT

## 2021-04-09 PROCEDURE — 85384 FIBRINOGEN ACTIVITY: CPT

## 2021-04-09 PROCEDURE — 94640 AIRWAY INHALATION TREATMENT: CPT

## 2021-04-09 PROCEDURE — 1200000000 HC SEMI PRIVATE

## 2021-04-09 PROCEDURE — 6360000002 HC RX W HCPCS: Performed by: INTERNAL MEDICINE

## 2021-04-09 RX ADMIN — NYSTATIN 500000 UNITS: 100000 SUSPENSION ORAL at 14:42

## 2021-04-09 RX ADMIN — ZINC SULFATE 220 MG (50 MG) CAPSULE 50 MG: CAPSULE at 10:32

## 2021-04-09 RX ADMIN — GUAIFENESIN AND DEXTROMETHORPHAN 10 ML: 100; 10 SYRUP ORAL at 10:32

## 2021-04-09 RX ADMIN — BENZONATATE 200 MG: 100 CAPSULE ORAL at 10:32

## 2021-04-09 RX ADMIN — BENZONATATE 200 MG: 100 CAPSULE ORAL at 21:57

## 2021-04-09 RX ADMIN — IPRATROPIUM BROMIDE AND ALBUTEROL 1 PUFF: 20; 100 SPRAY, METERED RESPIRATORY (INHALATION) at 16:19

## 2021-04-09 RX ADMIN — BENZONATATE 200 MG: 100 CAPSULE ORAL at 14:45

## 2021-04-09 RX ADMIN — THIAMINE HCL TAB 100 MG 100 MG: 100 TAB at 10:32

## 2021-04-09 RX ADMIN — NYSTATIN 500000 UNITS: 100000 SUSPENSION ORAL at 21:57

## 2021-04-09 RX ADMIN — IPRATROPIUM BROMIDE AND ALBUTEROL 1 PUFF: 20; 100 SPRAY, METERED RESPIRATORY (INHALATION) at 07:21

## 2021-04-09 RX ADMIN — ENOXAPARIN SODIUM 40 MG: 40 INJECTION SUBCUTANEOUS at 10:33

## 2021-04-09 RX ADMIN — SODIUM CHLORIDE: 9 INJECTION, SOLUTION INTRAVENOUS at 04:08

## 2021-04-09 RX ADMIN — BUDESONIDE AND FORMOTEROL FUMARATE DIHYDRATE 2 PUFF: 160; 4.5 AEROSOL RESPIRATORY (INHALATION) at 07:21

## 2021-04-09 RX ADMIN — REMDESIVIR 100 MG: 100 INJECTION, POWDER, LYOPHILIZED, FOR SOLUTION INTRAVENOUS at 10:39

## 2021-04-09 RX ADMIN — BUDESONIDE AND FORMOTEROL FUMARATE DIHYDRATE 2 PUFF: 160; 4.5 AEROSOL RESPIRATORY (INHALATION) at 16:19

## 2021-04-09 RX ADMIN — IPRATROPIUM BROMIDE AND ALBUTEROL 1 PUFF: 20; 100 SPRAY, METERED RESPIRATORY (INHALATION) at 10:43

## 2021-04-09 RX ADMIN — IPRATROPIUM BROMIDE AND ALBUTEROL 1 PUFF: 20; 100 SPRAY, METERED RESPIRATORY (INHALATION) at 23:07

## 2021-04-09 RX ADMIN — ASPIRIN 81 MG: 81 TABLET, FILM COATED ORAL at 10:33

## 2021-04-09 RX ADMIN — AZITHROMYCIN MONOHYDRATE 500 MG: 250 TABLET ORAL at 10:32

## 2021-04-09 RX ADMIN — PYRIDOXINE HCL TAB 50 MG 50 MG: 50 TAB at 10:32

## 2021-04-09 RX ADMIN — DEXAMETHASONE 6 MG: 2 TABLET ORAL at 10:32

## 2021-04-09 RX ADMIN — ENOXAPARIN SODIUM 40 MG: 40 INJECTION SUBCUTANEOUS at 21:57

## 2021-04-09 RX ADMIN — NYSTATIN 500000 UNITS: 100000 SUSPENSION ORAL at 18:01

## 2021-04-09 RX ADMIN — OXYCODONE HYDROCHLORIDE AND ACETAMINOPHEN 1000 MG: 500 TABLET ORAL at 10:32

## 2021-04-09 ASSESSMENT — PAIN DESCRIPTION - PROGRESSION
CLINICAL_PROGRESSION: GRADUALLY IMPROVING
CLINICAL_PROGRESSION: GRADUALLY IMPROVING

## 2021-04-09 ASSESSMENT — PAIN SCALES - GENERAL: PAINLEVEL_OUTOF10: 2

## 2021-04-09 NOTE — CARE COORDINATION
MORAIMA  Note: 2021 at 4:02pm: COVID POSITIVE - test done on . CM called and talked to the patient on the phone in her room. Currently on O2 3L - no home O2. Discussed the possible need for home O2 and reviewed choice list for home O2 providers. Patient states she would talk to her sister to see who she used. States plan remains to return home when discharged with her son. Watch for home O2.  Ruven Apgar RN

## 2021-04-09 NOTE — PROGRESS NOTES
Nutrition Assessment     Type and Reason for Visit: Initial, RD Nutrition Re-Screen/LOS    Nutrition Recommendations/Plan: Continue with diet     Nutrition Assessment:  Pt admits w/ CoVID 19 PNA w/  H/o COPD. Tolerating diet w/ >75% intakes    Nutrition Related Findings: A/ox4, Abd WDL, +BS, BLE +1 non-pitting edema, +I/O 8.6L.  Proning, Pt wishes no intubation      Current Nutrition Therapies:    DIET GENERAL;      Nutrition Diagnosis:   No nutrition diagnosis at this time     Nutrition Interventions:   Food and/or Nutrient Delivery:  Continue Current Diet  Nutrition Education/Counseling:  No recommendation at this time   Coordination of Nutrition Care:  Continue to monitor while inpatient    Goals:  Consume >75% meals       Nutrition Monitoring and Evaluation:   Behavioral-Environmental Outcomes:  None Identified   Food/Nutrient Intake Outcomes:  Food and Nutrient Intake  Physical Signs/Symptoms Outcomes:  Biochemical Data, Fluid Status or Edema, Nutrition Focused Physical Findings, Skin, Weight     Discharge Planning:    No discharge needs at this time     Electronically signed by Frankie Kraus RD, LD on 4/9/21 at 12:37 PM EDT    Contact: [phone]

## 2021-04-09 NOTE — PROGRESS NOTES
Patient did fair when ambulating to bathroom on 3L NC. Did not go below 88% o2 sat. Patient educated on energy conservation during tasks with return demonstration. Patient observed proning when entering room.

## 2021-04-09 NOTE — PROGRESS NOTES
Physical Therapy Treatment Note    Room #:  5285/9516-51  Patient Name: Jessi Alexander  YOB: 1950  MRN: [id number]    Referring Provider:   Rayna Thomason DO      Date of Service: 4/9/2021    Evaluating Physical Therapist: Elissa Jose, PT #[id number]      Diagnosis:   COPD exacerbation ([facility] [J44.1]       Patient Active Problem List   Diagnosis    COPD (chronic obstructive pulmonary disease) ([email or web address]    Anxiety    CAD (coronary artery disease)    Tobacco abuse    Hypothyroidism    GERD (gastroesophageal reflux disease)    Dyslipidemia    TIA (transient ischemic attack)    Thoracic outlet syndrome    CAP (community acquired pneumonia)    Syncope    Closed compression fracture of lumbar vertebra [facility]    COPD exacerbation ([facility]       Tentative placement recommendation:  Home Health Physical Therapy if goals met   Equipment recommendation: To be determined      Prior Level of Function: Patient ambulated independently , occassionally with a cane when foot hurts  Rehab Potential: good  for baseline    Past medical history:   Past Medical History:   Diagnosis Date    AAA (abdominal aortic aneurysm) ([email or web address] 2014    Arthritis     Blood circulation, collateral     thoracic outlet syndrome    CAD (coronary artery disease)     mi    COPD (chronic obstructive pulmonary disease) ([facility]     Emphysema with chronic bronchitis (HCC)     GERD (gastroesophageal reflux disease)     Hypothyroidism     MI (myocardial infarction) ([facility]     Substance abuse ([facility]     tobacco    Thoracic outlet syndrome     TIA (transient ischemic attack)     Unspecified cerebral artery occlusion with cerebral infarction      Past Surgical History:   Procedure Laterality Date    APPENDECTOMY      OVARY REMOVAL      also tube on right removed    TONSILLECTOMY AND ADENOIDECTOMY      TUBAL LIGATION Bilateral     UTERINE SUSPENSION         Precautions:  Activity as tolerated, falls, alarm and O2 , 3L, covid positive SUBJECTIVE:    Social history: Patient lives with son  And his wife in a ranch home  with 1+1+1  to enter with Rail  Tub shower grab bars    Equipment owned: Karthikeyan, [address] [facility] [employer] [address] chair and Grab bars,      [address]   How much difficulty turning over in bed?: A Little  How much difficulty sitting down on / standing up from a chair with arms?: A Little  How much difficulty moving from lying on back to sitting on side of bed?: A Little  How much help from another person moving to and from a bed to a chair?: A Little  How much help from another person needed to walk in hospital room?: A Little  How much help from another person for climbing 3-5 steps with a railing?: A Lot  AM-PAC Inpatient Mobility Raw Score : 17  AM-PAC Inpatient T-Scale Score : 42.13  Mobility Inpatient CMS 0-100% Score: 50.57  Mobility Inpatient CMS G-Code Modifier : CK    Nursing cleared patient for PT treatment. pt ok for function      OBJECTIVE;   Initial Evaluation  Date: 4/5/2021 Treatment Date:    4/9/2021   Short Term/ Long Term   Goals   Was pt agreeable to Eval/treatment? Yes  yes To be met in 4 days   Pain level   0/10   0/10    Bed Mobility    Rolling: Independent   Supine to sit: Supervision    Sit to supine: Supervision    Scooting: Supervision   Rolling: Not assessed    Supine to sit: Not assessed    Sit to supine: Supervision  to prone  Scooting: Supervision     Rolling: Independent   Supine to sit:  Independent   Sit to supine: Independent   Scooting: Independent    Transfers Sit to stand: Supervision   Sit to stand: Supervision  cuing for safety and pace     Sit to stand: Independent    Ambulation    2x75 feet using  IV pole with Supervision    , assistance with O2 line 2 x 20 feet using  IV pole with Supervision    cuing for breathing and slow pace      150 feet using  no device with Independent    Stair negotiation: ascended and descended   Not assessed       ROM Within functional limits Strength BUE:  refer to OT eval  RLE:  4+/5  LLE:  4+/5  Increase strength in affected mm groups by 1/3 grade   Balance Sitting EOB:  good   Dynamic Standing:  fair + with IV pole Sitting EOB: good  Dynamic Standing: fair +  With iv pole      Dynamic Standing: good      Patient is Alert & Oriented x person, place, time and situation and follows directions   Sensation:  Patient  denies numbness and tingling bilateral lower extremities    Edema:  no   Endurance: fair     Vitals: 3 liters nasal cannula   Blood Pressure at rest  Blood Pressure during session    Heart Rate at rest  Heart Rate during session    SPO2 at rest 92-93%  SPO2 during session 89-94%     Patient education  Patient educated on role of Physical Therapy, risks of immobility, safety and plan of care, energy conservation, importance of positional changes for oxygen exchange,  importance of mobility while in hospital , purse lip breathing, ankle pumps, quad set and glut set for edema control, blood clot prevention, safety  and O2 line management and safety    Patient was educated and facilitated on techniques to increase safety and independence with bed mobility, balance, functional transfers, and functional mobility. Patient was explained the the benefits of mobility and risks of immobility. Patient response to education:   Pt verbalized understanding Pt demonstrated skill Pt requires further education in this area   Yes Partial Yes      Treatment:  Patient practiced and was instructed/facilitated in the following treatment:   Patient Sat edge of bed 15 minutes with Supervision  to increase dynamic sitting balance and activity tolerance. pt amb in room 2 x 20 ft. Pt performed incentive spirometer while seated at side of bed. Therapeutic Exercises:  ankle pumps, long arc quad and seated marching  x  10-15reps.    Pulse ox monitored throughout with rests provided and cues for proper breathing, pt was instructed in and performed incentive spirometer

## 2021-04-09 NOTE — PROGRESS NOTES
pneumonia the patient possibly has a has a viral bronchitis/pneumonitis versus bacterial bronchitis. 4/4/2021  Patient seen examined on telemetry floor. Patient denies any chest or abdominal pain. Patient still complains of increased shortness of breath with activity and only about 25% improved from admission. Patient O2 saturation with activity yesterday on room air was 87%. Temperature was 97.8 with a heart rate of 100 and sinus rhythm. Blood pressure 120/70 with O2 sat today on 2 L nasal cannula was 91-96% at rest.  Patient lung sounds are improved today. 4/5/2021  Patient seen examined on telemetry floor. Patient still feels short of breath with any activity. Patient denies any chest or abdominal pain. There is no nausea or vomiting. Patient still with moist nonproductive cough. BUN/creatinine 13/0.6 with potassium increased to 5.3 today. CBC is normal.  Temperature 97.9 with heart rate 72 with blood pressure currently 141/75. O2 sat 95% on 2 L nasal cannula. Patient with persistent coarse breath sounds with mild scattered occasional rhonchi. 4/6/2021  Patient seen examined on telemetry floor. Case discussed with patient's nurse at the bedside today. Patient's heart rate dramatically increased today with activity and with patient having the oxygen off for short period time. Patient's O2 sat did drop into the 80s on room air. Patient's heart rate improved with putting oxygen back on. Currently the patient denies any problem chest pain, abdominal pain, nausea/vomiting or unusual shortness of breath at rest.  Patient little bit more symptomatic with dyspnea with exertion today. Patient lung sounds have decreased coarse breath sounds without wheezing or rhonchi today. BUN/creatinine was 19/0.7. Temperature 98.3 with heart rate 83. Patient thought to be in atrial fibrillation by nursing today. Blood pressure 123/73 with O2 sat 97% on 4 L nasal cannula.     4/7/2021  Patient seen examined on telemetry floor. Patient had a bad morning because of complaints of fever/chills, increased cough, dizziness and weakness. Patient had temp of 101.5 early this morning. Blood pressure went as low as 80/58 but currently 105/69. O2 sat 95% on 2 L currently but was as low as 90% on 4 L nasal cannula earlier this morning. Patient lung sounds have increased congestion with expiratory wheezing with scattered crackles. Patient will have repeat chest x-ray PA and lateral along with stat blood cultures along with CBC and BMP now. 4/8/2021  Patient seen examined on [facility]. Patient feels fairly good today. Patient currently on 4-5 L nasal cannula and is currently proning. Patient denies any chest or abdominal pain. Patient does have shortness of breath with activity. Patient COVID-19 antigen result was positive from yesterday early afternoon. Temperature 97.3 with heart rate of 76. Blood pressure 115/64. O2 sat 90% on 4 L nasal cannula at rest.  Patient switched over to Combivent and Symbicort inhaler along with consulting pharmacist for remdesivir and starting p.o. Decadron. Patient started on oral vitamin C, zinc, B6 and thiamine. 7 serology every 2 hours while awake. 4/9/2021  Patient seen examined on [facility]. Patient states she is feeling better today. Patient has been turned on 3 L nasal cannula from 4 L. Patient also complains of soreness in her mouth with examination showing some white plaques and mild erythema. Patient denies any chest or abdominal pain. There is no nausea vomiting. BUN/creatinine 20/0.6. D-dimer 2200 with a fibrinogen 468. Temperature 97.3 with heart rate of 95. Temperature 97.3 with heart rate 95 with blood pressure currently 124/88. O2 sat 97% on 3 L nasal cannula. Start statin suspension.     Past Medical History:    Past Medical History:   Diagnosis Date    AAA (abdominal aortic aneurysm) ([facility] 2014    Arthritis     Blood circulation, collateral     thoracic outlet syndrome    CAD (coronary artery disease)     mi    COPD (chronic obstructive pulmonary disease) (HCC)     Emphysema with chronic bronchitis (HCC)     GERD (gastroesophageal reflux disease)     Hypothyroidism     [facility]my[facility] infarction) [facility]     Substance abuse ([facility]     tobacco    Thoracic outlet syndrome     TIA (transient ischemic attack)     Unspecified cerebral artery occlusion with cerebral infarction      Past Surgical History:    Past Surgical History:   Procedure Laterality Date    APPENDECTOMY      OVARY REMOVAL      also tube on right removed    TONSILLECTOMY AND ADENOIDECTOMY      TUBAL LIGATION Bilateral     UTERINE SUSPENSION         Medications Prior to Admission:    @  Prior to Admission medications    Medication Sig Start Date End Date Taking? Authorizing Provider   Calcium Carbonate Antacid (TUMS ULTRA 1000) 1000 MG CHEW Take 1,000 mg by mouth 3 times daily as needed (Reflux)   Yes Historical Provider, MD   ibuprofen (ADVIL;MOTRIN) 200 MG tablet Take 400 mg by mouth every 6 hours as needed for Pain   Yes Historical Provider, MD   Cholecalciferol (VITAMIN D3) 50 MCG (2000 UT) CAPS Take 2,000 Units by mouth daily   Yes Historical Provider, MD   aspirin 81 MG EC tablet Take 1 tablet by mouth daily 12/5/19  Yes Gamal Cai,        Allergies:  Penicillins, Tetanus toxoids, Ciprofloxacin, Effexor [venlafaxine], Kiwi extract, Betadine [povidone iodine], Codeine, Demerol, Sulfa antibiotics, and Tape [place] Al[place] tape]    Social History:   Social History     Socioeconomic History    Marital status:       Spouse name: Not on file    Number of children: Not on file    Years of education: Not on file    Highest education level: Not on file   Occupational History    Not on file   Social Needs    Financial resource strain: Not on file    Food insecurity     Worry: Not on file     Inability: Not on file    Transportation needs     Medical: Not on file     Non-medical: Not on file   Tobacco Use    Smoking status: Current Some Day Smoker     Packs/day: 1.00     Years: 51.00     Pack years: 51.00     Types: Cigarettes    Smokeless tobacco: Never Used    Tobacco comment: IN THE PROCESS OF QUITTING   Substance and Sexual Activity    Alcohol use: Not Currently     Comment: occasional    Drug use: No    Sexual activity: Not on file   Lifestyle    Physical activity     Days per week: Not on file     Minutes per session: Not on file    Stress: Not on file   Relationships    Social connections     Talks on phone: Not on file     Gets together: Not on file     Attends Jew service: Not on file     Active member of club or organization: Not on file     Attends meetings of clubs or organizations: Not on file     Relationship status: Not on file    Intimate partner violence     Fear of current or ex partner: Not on file     Emotionally abused: Not on file     Physically abused: Not on file     Forced sexual activity: Not on file   Other Topics Concern    Not on file   Social History Narrative    Not on file       Family History:   Family History   Problem Relation Age of Onset    Liver Disease Father     Cancer Father     Heart Disease Father     Cancer Other         breast    Cancer Mother         breast    Heart Disease Mother     Dementia Mother     Diabetes Mother        REVIEW OF SYSTEMS:    Gen: Patient denies any lightheadedness or dizziness. No LOC or syncope. No fevers or chills. HEENT: No earache, sore throat or nasal congestion. Resp: +cough with scant white productive sputum, no hemoptysis   Cardiac: Denies chest pain, SOB, diaphoresis or palpitations. GI: No nausea, vomiting, diarrhea or constipation. No melena or hematochezia. : No urinary complaints, dysuria, hematuria or frequency. MSK: No extremity weakness, paralysis or paresthesias.      PHYSICAL EXAM:    Vitals:  /88   Pulse 95   Temp 97 °F (36.1 °C) (Oral)   Resp 18   Ht 5' 6\" (1.676 m)   Wt 148 lb (67.1 kg)   SpO2 97%   BMI 23.89 kg/m²     General:  This is a 73 y.o. yo female who is alert and oriented in NAD  HEENT:  Head is normocephalic and atraumatic, PERRLA, EOMI, mucus membranes moist with no pharyngeal erythema or exudate. Neck:  Supple with no carotid bruits, JVD or thyromegaly.   No cervical adenopathy  CV:  Regular rate and rhythm, no murmurs  Lungs: Coarse decreased breath sounds to auscultation bilaterally with mild scattered right lung crackles with no wheezes, rhonchi  Abdomen:  Soft, nontender, nondistended, bowel sounds present  Extremities:  No edema, peripheral pulses intact bilaterally  Neuro:  Cranial nerves II-XII grossly intact; motor and sensory function intact with no focal deficits  Skin:  No rashes, lesions or wounds    DATA:  CBC with Differential:    Lab Results   Component Value Date    WBC 8.6 04/08/2021    RBC 4.12 04/08/2021    HGB 12.8 04/08/2021    HCT 38.5 04/08/2021     04/08/2021    MCV 93.4 04/08/2021    MCH 31.1 04/08/2021    MCHC 33.2 04/08/2021    RDW 13.2 04/08/2021    LYMPHOPCT 8.1 04/08/2021    MONOPCT 4.1 04/08/2021    MYELOPCT 2.6 04/07/2021    BASOPCT 0.1 04/08/2021    MONOSABS 0.35 04/08/2021    LYMPHSABS 0.70 04/08/2021    EOSABS 0.00 04/08/2021    BASOSABS 0.01 04/08/2021     CMP:    Lab Results   Component Value Date     04/09/2021    K 4.4 04/09/2021    K 3.4 04/02/2021     04/09/2021    CO2 21 04/09/2021    BUN 20 04/09/2021    CREATININE 0.6 04/09/2021    GFRAA >60 04/09/2021    LABGLOM >60 04/09/2021    GLUCOSE 89 04/09/2021    GLUCOSE 83 02/03/2011    PROT 5.7 04/09/2021    LABALBU 2.7 04/09/2021    LABALBU 4.1 02/03/2011    CALCIUM 7.7 04/09/2021    BILITOT <0.2 04/09/2021    ALKPHOS 87 04/09/2021    AST 51 04/09/2021    ALT 37 04/09/2021     Magnesium:    Lab Results   Component Value Date    MG 1.8 04/03/2021     Phosphorus:    Lab Results   Component Value Date    PHOS 4.0 07/02/2017     PT/INR:    Lab Results   Component Value Date    PROTIME 12.1 04/02/2021    INR 1.0 04/02/2021     Troponin:    Lab Results   Component Value Date    TROPONINI <0.01 04/02/2021     U/A:    Lab Results   Component Value Date    COLORU Straw 12/03/2019    PROTEINU Negative 12/03/2019    PHUR 7.0 12/03/2019    WBCUA 0-1 12/03/2019    RBCUA NONE 12/03/2019    BACTERIA NONE 12/03/2019    CLARITYU Clear 12/03/2019    SPECGRAV 1.010 12/03/2019    LEUKOCYTESUR TRACE 12/03/2019    UROBILINOGEN 0.2 12/03/2019    BILIRUBINUR Negative 12/03/2019    BLOODU TRACE 12/03/2019    GLUCOSEU Negative 12/03/2019     ABG:  No results found for: PH, PCO2, PO2, HCO3, BE, THGB, TCO2, O2SAT  HgBA1c:    Lab Results   Component Value Date    LABA1C 5.2 07/02/2017     FLP:    Lab Results   Component Value Date    TRIG 69 04/03/2021    HDL 50 04/03/2021    LDLCALC 58 04/03/2021    LABVLDL 14 04/03/2021     TSH:    Lab Results   Component Value Date    TSH 1.810 04/03/2021     IRON:  No results found for: IRON  LIPASE:    Lab Results   Component Value Date    LIPASE 39 01/03/2020       ASSESSMENT AND PLAN:      Patient Active Problem List    Diagnosis Date Noted    CAP (community acquired pneumonia) 04/10/2015     Priority: High    Syncope 04/10/2015     Priority: High    TIA (transient ischemic attack) 06/01/2013     Priority: High    COPD exacerbation ([address] 04/02/2021    Closed compression fracture of lumbar vertebra ([address] 07/01/2017    Thoracic outlet syndrome     COPD (chronic obstructive pulmonary disease) ([address] 02/02/2011    Anxiety 02/02/2011    CAD (coronary artery disease) 02/02/2011    Tobacco abuse 02/02/2011    Hypothyroidism 02/02/2011    GERD (gastroesophageal reflux disease) 02/02/2011    Dyslipidemia 02/02/2011     Impression:  1. Acute hypoxic respiratory failure  2. Acute exacerbation of COPD with current tobacco use  3. Possible right lower lobe pneumonia  4. Acute COVID-19 respiratory infection  5. Hypothyroidism  6. Hyperlipidemia  7. History of TIA  8. History of coronary disease    Plan:  Admit to telemetry floor  Home medication reviewed  Monitor heart rate, blood pressure, O2 saturation  O2 nasal cannula   Lovenox 40 mg subcu twice daily  DuoNeb aerosols 4 times daily  Sputum and blood cultures-negative  Azithromycin 500 mg daily  Robitussin-DM 10 cc every 4 hours as needed  General diet  Up ad merle.   Tessalon Perles 200 mg p.o. 3 times daily    Chest x-ray PA and lateral -increased opacities lung bases  Blood cultures-negative  Transfer to [facility]  Remdesivir IV piggyback x5 days  Decadron 6 mg p.o. daily  Vitamin C, zinc, vitamin B6, thiamine 1 mg daily  Incentive spirometry every 2 hours while awake  Proning as much as possible  Combivent inhaler 2 puffs 4 times daily  Symbicort inhaler 2 puffs twice daily    Mycostatin suspension 5 cc swish and swallow 4 times daily    Terence Ramos D.O.  4/9/2021  9:35 AM

## 2021-04-09 NOTE — PROGRESS NOTES
Patient stated concerns during care. Stated she spoke to case management today about code status. Patient is adamant she does not wish to have CPR done, she does not want to be \"stuck on a ventilator\" and does not wish to be intubated.

## 2021-04-10 LAB
ALBUMIN SERPL-MCNC: 2.7 G/DL (ref 3.5–5.2)
ALP BLD-CCNC: 86 U/L (ref 35–104)
ALT SERPL-CCNC: 43 U/L (ref 0–32)
ANION GAP SERPL CALCULATED.3IONS-SCNC: 8 MMOL/L (ref 7–16)
AST SERPL-CCNC: 37 U/L (ref 0–31)
BILIRUB SERPL-MCNC: <0.2 MG/DL (ref 0–1.2)
BUN BLDV-MCNC: 12 MG/DL (ref 8–23)
CALCIUM SERPL-MCNC: 7.6 MG/DL (ref 8.6–10.2)
CHLORIDE BLD-SCNC: 108 MMOL/L (ref 98–107)
CO2: 24 MMOL/L (ref 22–29)
CREAT SERPL-MCNC: 0.6 MG/DL (ref 0.5–1)
GFR AFRICAN AMERICAN: >60
GFR NON-AFRICAN AMERICAN: >60 ML/MIN/1.73
GLUCOSE BLD-MCNC: 79 MG/DL (ref 74–99)
POTASSIUM SERPL-SCNC: 4 MMOL/L (ref 3.5–5)
SODIUM BLD-SCNC: 140 MMOL/L (ref 132–146)
TOTAL PROTEIN: 5.4 G/DL (ref 6.4–8.3)

## 2021-04-10 PROCEDURE — 2580000003 HC RX 258: Performed by: INTERNAL MEDICINE

## 2021-04-10 PROCEDURE — 2700000000 HC OXYGEN THERAPY PER DAY

## 2021-04-10 PROCEDURE — 94640 AIRWAY INHALATION TREATMENT: CPT

## 2021-04-10 PROCEDURE — 80053 COMPREHEN METABOLIC PANEL: CPT

## 2021-04-10 PROCEDURE — 2500000003 HC RX 250 WO HCPCS: Performed by: INTERNAL MEDICINE

## 2021-04-10 PROCEDURE — 6370000000 HC RX 637 (ALT 250 FOR IP): Performed by: INTERNAL MEDICINE

## 2021-04-10 PROCEDURE — 36415 COLL VENOUS BLD VENIPUNCTURE: CPT

## 2021-04-10 PROCEDURE — 6360000002 HC RX W HCPCS: Performed by: INTERNAL MEDICINE

## 2021-04-10 PROCEDURE — 1200000000 HC SEMI PRIVATE

## 2021-04-10 RX ADMIN — DEXAMETHASONE 6 MG: 2 TABLET ORAL at 08:25

## 2021-04-10 RX ADMIN — NYSTATIN 500000 UNITS: 100000 SUSPENSION ORAL at 17:20

## 2021-04-10 RX ADMIN — BUDESONIDE AND FORMOTEROL FUMARATE DIHYDRATE 2 PUFF: 160; 4.5 AEROSOL RESPIRATORY (INHALATION) at 19:05

## 2021-04-10 RX ADMIN — ENOXAPARIN SODIUM 40 MG: 40 INJECTION SUBCUTANEOUS at 20:29

## 2021-04-10 RX ADMIN — IPRATROPIUM BROMIDE AND ALBUTEROL 1 PUFF: 20; 100 SPRAY, METERED RESPIRATORY (INHALATION) at 19:05

## 2021-04-10 RX ADMIN — OXYCODONE HYDROCHLORIDE AND ACETAMINOPHEN 1000 MG: 500 TABLET ORAL at 08:23

## 2021-04-10 RX ADMIN — ASPIRIN 81 MG: 81 TABLET, FILM COATED ORAL at 08:22

## 2021-04-10 RX ADMIN — ZINC SULFATE 220 MG (50 MG) CAPSULE 50 MG: CAPSULE at 08:23

## 2021-04-10 RX ADMIN — BENZONATATE 200 MG: 100 CAPSULE ORAL at 08:23

## 2021-04-10 RX ADMIN — IBUPROFEN 800 MG: 800 TABLET, FILM COATED ORAL at 05:23

## 2021-04-10 RX ADMIN — ENOXAPARIN SODIUM 40 MG: 40 INJECTION SUBCUTANEOUS at 08:24

## 2021-04-10 RX ADMIN — BENZONATATE 200 MG: 100 CAPSULE ORAL at 20:29

## 2021-04-10 RX ADMIN — REMDESIVIR 100 MG: 100 INJECTION, POWDER, LYOPHILIZED, FOR SOLUTION INTRAVENOUS at 12:23

## 2021-04-10 RX ADMIN — IPRATROPIUM BROMIDE AND ALBUTEROL 1 PUFF: 20; 100 SPRAY, METERED RESPIRATORY (INHALATION) at 11:20

## 2021-04-10 RX ADMIN — PYRIDOXINE HCL TAB 50 MG 50 MG: 50 TAB at 08:23

## 2021-04-10 RX ADMIN — BENZONATATE 200 MG: 100 CAPSULE ORAL at 12:24

## 2021-04-10 RX ADMIN — NYSTATIN 500000 UNITS: 100000 SUSPENSION ORAL at 12:23

## 2021-04-10 RX ADMIN — SODIUM CHLORIDE: 9 INJECTION, SOLUTION INTRAVENOUS at 12:23

## 2021-04-10 RX ADMIN — THIAMINE HCL TAB 100 MG 100 MG: 100 TAB at 08:23

## 2021-04-10 RX ADMIN — NYSTATIN 500000 UNITS: 100000 SUSPENSION ORAL at 20:29

## 2021-04-10 RX ADMIN — IPRATROPIUM BROMIDE AND ALBUTEROL 1 PUFF: 20; 100 SPRAY, METERED RESPIRATORY (INHALATION) at 06:15

## 2021-04-10 RX ADMIN — AZITHROMYCIN MONOHYDRATE 500 MG: 250 TABLET ORAL at 08:22

## 2021-04-10 RX ADMIN — BUDESONIDE AND FORMOTEROL FUMARATE DIHYDRATE 2 PUFF: 160; 4.5 AEROSOL RESPIRATORY (INHALATION) at 06:15

## 2021-04-10 RX ADMIN — NYSTATIN 500000 UNITS: 100000 SUSPENSION ORAL at 08:24

## 2021-04-10 RX ADMIN — SODIUM CHLORIDE: 9 INJECTION, SOLUTION INTRAVENOUS at 03:05

## 2021-04-10 ASSESSMENT — PAIN DESCRIPTION - ORIENTATION: ORIENTATION: LOWER

## 2021-04-10 ASSESSMENT — PAIN DESCRIPTION - DESCRIPTORS: DESCRIPTORS: ACHING;DISCOMFORT;SORE

## 2021-04-10 ASSESSMENT — PAIN SCALES - GENERAL
PAINLEVEL_OUTOF10: 5
PAINLEVEL_OUTOF10: 0

## 2021-04-10 ASSESSMENT — PAIN DESCRIPTION - ONSET
ONSET: GRADUAL
ONSET: ON-GOING

## 2021-04-10 ASSESSMENT — PAIN DESCRIPTION - PAIN TYPE
TYPE: CHRONIC PAIN
TYPE: ACUTE PAIN;CHRONIC PAIN

## 2021-04-10 ASSESSMENT — PAIN DESCRIPTION - FREQUENCY: FREQUENCY: INTERMITTENT

## 2021-04-10 ASSESSMENT — PAIN DESCRIPTION - PROGRESSION
CLINICAL_PROGRESSION: NOT CHANGED
CLINICAL_PROGRESSION: GRADUALLY IMPROVING

## 2021-04-10 NOTE — PLAN OF CARE
Problem: Pain:  Goal: Pain level will decrease  Description: Pain level will decrease  4/10/2021 1417 by Love Luz RN  Outcome: Met This Shift  4/10/2021 0422 by Arabella Camacho RN  Outcome: Met This Shift     Problem: Falls - Risk of:  Goal: Will remain free from falls  Description: Will remain free from falls  4/10/2021 1417 by Love Luz RN  Outcome: Met This Shift  4/10/2021 0422 by Arabella Camacho RN  Outcome: Met This Shift     Problem: Falls - Risk of:  Goal: Absence of physical injury  Description: Absence of physical injury  4/10/2021 1417 by Love Luz RN  Outcome: Met This Shift  4/10/2021 0422 by Arabella Camacho RN  Outcome: Met This Shift

## 2021-04-10 NOTE — PROGRESS NOTES
Department of Internal Medicine        CHIEF COMPLAINT: Shortness of breath, fever/chills body aches and nonproductive cough. Reason for Admission:   Required pneumonia right lower lobe    HISTORY OF PRESENT ILLNESS:      The patient is a 73 y.o. female who presents with increased shortness of breath, fever/chills, scant productive cough and generalized body aches. Patient denied chest pain today but did have some chest tightness yesterday that resolved. There is no associated nausea/vomiting or diarrhea. Patient has a scant productive white sputum off and on. Patient also complains of cephalgia. Patient's had increased shortness of breath with activity. Symptoms started about 5 days ago. Patient states she has not received her Covid vaccine with the COVID-19 antigen being negative. WBC is 3.2 with potassium 3.4. Temperature is normal 98.1 with a heart rate of 100 blood pressure 117/56. O2 sat was 95% on 2 L. Patient was ambulated short distance in the ED and the O2 sat did go down as low as 87% on room air. Chest x-ray show pronounced emphysema along with right greater than left base atelectasis or infiltrates. Patient was mated the hospital because of acute hypoxic respiratory failure and possibly underlying viral versus bacterial pneumonia in right lung. 4/3/2021  Patient seen examined on telemetry floor. Patient feels little bit better today. Patient still has a harsh nonproductive cough. Lung sounds still have some mild expiratory wheezing and coarse breath sounds. Patient still short of breath with activity. BUN/creatinine is 12/0.7 with potassium improved to 4.2. WBC still low at 3.1 with hemoglobin 1.6. Temperature ranges 98.699.1 with heart rate 94. Blood pressure ranges 90/50 5113/55. O2 sat 94% on 2 L nasal cannula at rest.  CT the chest showed extensive emphysematous changes with scarring or atelectasis in the bases.   With mild leukopenia and a CAT scan showing no definitive pneumonia the patient possibly has a has a viral bronchitis/pneumonitis versus bacterial bronchitis. 4/4/2021  Patient seen examined on telemetry floor. Patient denies any chest or abdominal pain. Patient still complains of increased shortness of breath with activity and only about 25% improved from admission. Patient O2 saturation with activity yesterday on room air was 87%. Temperature was 97.8 with a heart rate of 100 and sinus rhythm. Blood pressure 120/70 with O2 sat today on 2 L nasal cannula was 91-96% at rest.  Patient lung sounds are improved today. 4/5/2021  Patient seen examined on telemetry floor. Patient still feels short of breath with any activity. Patient denies any chest or abdominal pain. There is no nausea or vomiting. Patient still with moist nonproductive cough. BUN/creatinine 13/0.6 with potassium increased to 5.3 today. CBC is normal.  Temperature 97.9 with heart rate 72 with blood pressure currently 141/75. O2 sat 95% on 2 L nasal cannula. Patient with persistent coarse breath sounds with mild scattered occasional rhonchi. 4/6/2021  Patient seen examined on telemetry floor. Case discussed with patient's nurse at the bedside today. Patient's heart rate dramatically increased today with activity and with patient having the oxygen off for short period time. Patient's O2 sat did drop into the 80s on room air. Patient's heart rate improved with putting oxygen back on. Currently the patient denies any problem chest pain, abdominal pain, nausea/vomiting or unusual shortness of breath at rest.  Patient little bit more symptomatic with dyspnea with exertion today. Patient lung sounds have decreased coarse breath sounds without wheezing or rhonchi today. BUN/creatinine was 19/0.7. Temperature 98.3 with heart rate 83. Patient thought to be in atrial fibrillation by nursing today. Blood pressure 123/73 with O2 sat 97% on 4 L nasal cannula.     4/7/2021  Patient seen examined on telemetry floor. Patient had a bad morning because of complaints of fever/chills, increased cough, dizziness and weakness. Patient had temp of 101.5 early this morning. Blood pressure went as low as 80/58 but currently 105/69. O2 sat 95% on 2 L currently but was as low as 90% on 4 L nasal cannula earlier this morning. Patient lung sounds have increased congestion with expiratory wheezing with scattered crackles. Patient will have repeat chest x-ray PA and lateral along with stat blood cultures along with CBC and BMP now. 4/8/2021  Patient seen examined on [address]. Patient feels fairly good today. Patient currently on 4-5 L nasal cannula and is currently proning. Patient denies any chest or abdominal pain. Patient does have shortness of breath with activity. Patient COVID-19 antigen result was positive from yesterday early afternoon. Temperature 97.3 with heart rate of 76. Blood pressure 115/64. O2 sat 90% on 4 L nasal cannula at rest.  Patient switched over to Combivent and Symbicort inhaler along with consulting pharmacist for remdesivir and starting p.o. Decadron. Patient started on oral vitamin C, zinc, B6 and thiamine. 7 serology every 2 hours while awake. 4/9/2021  Patient seen examined on [address]. Patient states she is feeling better today. Patient has been turned on 3 L nasal cannula from 4 L. Patient also complains of soreness in her mouth with examination showing some white plaques and mild erythema. Patient denies any chest or abdominal pain. There is no nausea vomiting. BUN/creatinine 20/0.6. D-dimer 2200 with a fibrinogen 468. Temperature 97.3 with heart rate of 95. Temperature 97.3 with heart rate 95 with blood pressure currently 124/88. O2 sat 97% on 3 L nasal cannula. Start statin suspension. 4/10/2021  Patient seen examined on [address]. Patient states she feels better today. Patient denies any chest pain or abdominal pain.   Patient is breathing again is improved with patient have a nonproductive cough. BUN/creatinine 12/0.6. Transaminases mildly elevated. Temperatures normal at 97.2 with heart rate 86 and blood pressure currently 141/73. O2 sat 93% on 3 L nasal cannula at rest.    Past Medical History:    Past Medical History:   Diagnosis Date    AAA [facility]) ([facility] 2014    Arthritis     Blood circulation, collateral     thoracic outlet syndrome    CAD (coronary artery disease)     mi    COPD (chronic obstructive pulmonary disease) (HCC)     Emphysema with chronic bronchitis (HCC)     GERD (gastroesophageal reflux disease)     Hypothyroidism     MI (myocardial infarction) ([facility]     Substance abuse ([facility]     tobacco    Thoracic outlet syndrome     TIA (transient ischemic attack)     Unspecified cerebral artery occlusion with cerebral infarction      Past Surgical History:    Past Surgical History:   Procedure Laterality Date    APPENDECTOMY      OVARY REMOVAL      also tube on right removed    TONSILLECTOMY AND ADENOIDECTOMY      TUBAL LIGATION Bilateral     UTERINE SUSPENSION         Medications Prior to Admission:    @  Prior to Admission medications    Medication Sig Start Date End Date Taking?  Authorizing Provider   Calcium Carbonate Antacid (TUMS ULTRA 1000) 1000 MG CHEW Take 1,000 mg by mouth 3 times daily as needed (Reflux)   Yes Historical Provider, MD   ibuprofen (ADVIL;MOTRIN) 200 MG tablet Take 400 mg by mouth every 6 hours as needed for Pain   Yes Historical Provider, MD   Cholecalciferol (VITAMIN D3) 50 MCG (2000 UT) CAPS Take 2,000 Units by mouth daily   Yes Historical Provider, MD   aspirin 81 MG EC tablet Take 1 tablet by mouth daily 12/5/19  Yes [employer], DO       Allergies:  Penicillins, Tetanus toxoids, Ciprofloxacin, Effexor [venlafaxine], Kiwi extract, Betadine [povidone iodine], Codeine, Demerol, Sulfa antibiotics, and Tape [place]  tape]    Social History:   Social History     Socioeconomic History    Marital status:      Spouse name: Not on file    Number of children: Not on file    Years of education: Not on file    Highest education level: Not on file   Occupational History    Not on file   Social Needs    Financial resource strain: Not on file    Food insecurity     Worry: Not on file     Inability: Not on file    Transportation needs     Medical: Not on file     Non-medical: Not on file   Tobacco Use    Smoking status: Current Some Day Smoker     Packs/day: 1.00     Years: 51.00     Pack years: 51.00     Types: Cigarettes    Smokeless tobacco: Never Used    Tobacco comment: IN THE PROCESS OF QUITTING   Substance and Sexual Activity    Alcohol use: Not Currently     Comment: occasional    Drug use: No    Sexual activity: Not on file   Lifestyle    Physical activity     Days per week: Not on file     Minutes per session: Not on file    Stress: Not on file   Relationships    Social connections     Talks on phone: Not on file     Gets together: Not on file     Attends [employer] service: Not on file     Active member of club or organization: Not on file     Attends meetings of clubs or organizations: Not on file     Relationship status: Not on file    Intimate partner violence     Fear of current or ex partner: Not on file     Emotionally abused: Not on file     Physically abused: Not on file     Forced sexual activity: Not on file   Other Topics Concern    Not on file   Social History Narrative    Not on file       Family History:   Family History   Problem Relation Age of Onset    Liver Disease Father     Cancer Father     Heart Disease Father     Cancer Other         breast    Cancer Mother         breast    Heart Disease Mother     Dementia Mother     Diabetes Mother        REVIEW OF SYSTEMS:    Gen: Patient denies any lightheadedness or dizziness. No LOC or syncope. No fevers or chills. HEENT: No earache, sore throat or nasal congestion.     Resp: +cough with scant white productive sputum, no hemoptysis   Cardiac: Denies chest pain, SOB, diaphoresis or palpitations. GI: No nausea, vomiting, diarrhea or constipation. No melena or hematochezia. : No urinary complaints, dysuria, hematuria or frequency. MSK: No extremity weakness, paralysis or paresthesias. PHYSICAL EXAM:    Vitals:  BP (!) 141/73   Pulse 86   Temp 97.2 °F (36.2 °C) (Infrared)   Resp 18   Ht 5' 6\" (1.676 m)   Wt 148 lb (67.1 kg)   SpO2 93%   BMI 23.89 kg/m²     General:  This is a 73 y.o. yo female who is alert and oriented in NAD  HEENT:  Head is normocephalic and atraumatic, PERRLA, EOMI, mucus membranes moist with no pharyngeal erythema or exudate. Neck:  Supple with no carotid bruits, JVD or thyromegaly.   No cervical adenopathy  CV:  Regular rate and rhythm, no murmurs  Lungs: Coarse decreased breath sounds to auscultation bilaterally with mild scattered right lung crackles with no wheezes, rhonchi  Abdomen:  Soft, nontender, nondistended, bowel sounds present  Extremities:  No edema, peripheral pulses intact bilaterally  Neuro:  Cranial nerves II-XII grossly intact; motor and sensory function intact with no focal deficits  Skin:  No rashes, lesions or wounds    DATA:  CBC with Differential:    Lab Results   Component Value Date    WBC 8.6 04/08/2021    RBC 4.12 04/08/2021    HGB 12.8 04/08/2021    HCT 38.5 04/08/2021     04/08/2021    MCV 93.4 04/08/2021    MCH 31.1 04/08/2021    MCHC 33.2 04/08/2021    RDW 13.2 04/08/2021    LYMPHOPCT 8.1 04/08/2021    MONOPCT 4.1 04/08/2021    MYELOPCT 2.6 04/07/2021    BASOPCT 0.1 04/08/2021    MONOSABS 0.35 04/08/2021    LYMPHSABS 0.70 04/08/2021    EOSABS 0.00 04/08/2021    BASOSABS 0.01 04/08/2021     CMP:    Lab Results   Component Value Date     04/10/2021    K 4.0 04/10/2021    K 3.4 04/02/2021     04/10/2021    CO2 24 04/10/2021    BUN 12 04/10/2021    CREATININE 0.6 04/10/2021    GFRAA >60 04/10/2021    LABGLOM >60 04/10/2021 GLUCOSE 79 04/10/2021    GLUCOSE 83 02/03/2011    PROT 5.4 04/10/2021    LABALBU 2.7 04/10/2021    LABALBU 4.1 02/03/2011    CALCIUM 7.6 04/10/2021    BILITOT <0.2 04/10/2021    ALKPHOS 86 04/10/2021    AST 37 04/10/2021    ALT 43 04/10/2021     Magnesium:    Lab Results   Component Value Date    MG 1.8 04/03/2021     Phosphorus:    Lab Results   Component Value Date    PHOS 4.0 07/02/2017     PT/INR:    Lab Results   Component Value Date    PROTIME 12.1 04/02/2021    INR 1.0 04/02/2021     Troponin:    Lab Results   Component Value Date    TROPONINI <0.01 04/02/2021     U/A:    Lab Results   Component Value Date    COLORU Straw 12/03/2019    PROTEINU Negative 12/03/2019    PHUR 7.0 12/03/2019    WBCUA 0-1 12/03/2019    RBCUA NONE 12/03/2019    BACTERIA NONE 12/03/2019    CLARITYU Clear 12/03/2019    SPECGRAV 1.010 12/03/2019    LEUKOCYTESUR TRACE 12/03/2019    UROBILINOGEN 0.2 12/03/2019    BILIRUBINUR Negative 12/03/2019    BLOODU TRACE 12/03/2019    GLUCOSEU Negative 12/03/2019     ABG:  No results found for: PH, PCO2, PO2, HCO3, BE, THGB, TCO2, O2SAT  HgBA1c:    Lab Results   Component Value Date    LABA1C 5.2 07/02/2017     FLP:    Lab Results   Component Value Date    TRIG 69 04/03/2021    HDL 50 04/03/2021    LDLCALC 58 04/03/2021    LABVLDL 14 04/03/2021     TSH:    Lab Results   Component Value Date    TSH 1.810 04/03/2021     IRON:  No results found for: IRON  LIPASE:    Lab Results   Component Value Date    LIPASE 39 01/03/2020       ASSESSMENT AND PLAN:      Patient Active Problem List    Diagnosis Date Noted    CAP (community acquired pneumonia) 04/10/2015     Priority: High    Syncope 04/10/2015     Priority: High    TIA (transient ischemic attack) 06/01/2013     Priority: High    COPD exacerbation ([facility] 04/02/2021    Closed compression fracture of lumbar vertebra ([address] 07/01/2017    Thoracic outlet syndrome     COPD (chronic obstructive pulmonary disease) ([facility] 02/02/2011    Anxiety 02/02/2011

## 2021-04-10 NOTE — PLAN OF CARE
Problem: Pain:  Goal: Pain level will decrease  Description: Pain level will decrease  Outcome: Met This Shift     Problem: Pain:  Goal: Control of acute pain  Description: Control of acute pain  Outcome: Met This Shift     Problem: Pain:  Goal: Control of chronic pain  Description: Control of chronic pain  Outcome: Met This Shift     Problem: Falls - Risk of:  Goal: Will remain free from falls  Description: Will remain free from falls  4/10/2021 0422 by Ayse Chapa RN  Outcome: Met This Shift     Problem: Falls - Risk of:  Goal: Absence of physical injury  Description: Absence of physical injury  4/10/2021 0422 by Ayse Chapa RN  Outcome: Met This Shift     Problem: Skin Integrity:  Goal: Will show no infection signs and symptoms  Description: Will show no infection signs and symptoms  Outcome: Met This Shift     Problem: Skin Integrity:  Goal: Absence of new skin breakdown  Description: Absence of new skin breakdown  Outcome: Met This Shift     Problem: Airway Clearance - Ineffective  Goal: Achieve or maintain patent airway  Outcome: Met This Shift     Problem: Gas Exchange - Impaired  Goal: Absence of hypoxia  Outcome: Met This Shift     Problem: Gas Exchange - Impaired  Goal: Promote optimal lung function  Outcome: Met This Shift     Problem: Breathing Pattern - Ineffective  Goal: Ability to achieve and maintain a regular respiratory rate  Outcome: Met This Shift     Problem: Body Temperature -  Risk of, Imbalanced  Goal: Ability to maintain a body temperature within defined limits  Outcome: Met This Shift     Problem: Body Temperature -  Risk of, Imbalanced  Goal: Will regain or maintain usual level of consciousness  Outcome: Met This Shift     Problem:  Body Temperature -  Risk of, Imbalanced  Goal: Complications related to the disease process, condition or treatment will be avoided or minimized  Outcome: Met This Shift     Problem: Isolation Precautions - Risk of Spread of Infection  Goal: Prevent

## 2021-04-10 NOTE — PROGRESS NOTES
Patient reports she has been using her incentive spirometer \" during commercials\" and verbalized understanding of how to use it.

## 2021-04-11 LAB
ALBUMIN SERPL-MCNC: 2.4 G/DL (ref 3.5–5.2)
ALP BLD-CCNC: 72 U/L (ref 35–104)
ALT SERPL-CCNC: 30 U/L (ref 0–32)
ANION GAP SERPL CALCULATED.3IONS-SCNC: 8 MMOL/L (ref 7–16)
AST SERPL-CCNC: 26 U/L (ref 0–31)
BILIRUB SERPL-MCNC: <0.2 MG/DL (ref 0–1.2)
BUN BLDV-MCNC: 15 MG/DL (ref 8–23)
CALCIUM SERPL-MCNC: 7.4 MG/DL (ref 8.6–10.2)
CHLORIDE BLD-SCNC: 107 MMOL/L (ref 98–107)
CO2: 23 MMOL/L (ref 22–29)
CREAT SERPL-MCNC: 0.6 MG/DL (ref 0.5–1)
GFR AFRICAN AMERICAN: >60
GFR NON-AFRICAN AMERICAN: >60 ML/MIN/1.73
GLUCOSE BLD-MCNC: 85 MG/DL (ref 74–99)
POTASSIUM SERPL-SCNC: 3.5 MMOL/L (ref 3.5–5)
SODIUM BLD-SCNC: 138 MMOL/L (ref 132–146)
TOTAL PROTEIN: 5.2 G/DL (ref 6.4–8.3)

## 2021-04-11 PROCEDURE — 2500000003 HC RX 250 WO HCPCS: Performed by: INTERNAL MEDICINE

## 2021-04-11 PROCEDURE — 2700000000 HC OXYGEN THERAPY PER DAY

## 2021-04-11 PROCEDURE — 36415 COLL VENOUS BLD VENIPUNCTURE: CPT

## 2021-04-11 PROCEDURE — 6370000000 HC RX 637 (ALT 250 FOR IP): Performed by: INTERNAL MEDICINE

## 2021-04-11 PROCEDURE — 80053 COMPREHEN METABOLIC PANEL: CPT

## 2021-04-11 PROCEDURE — 94640 AIRWAY INHALATION TREATMENT: CPT

## 2021-04-11 PROCEDURE — 1200000000 HC SEMI PRIVATE

## 2021-04-11 PROCEDURE — 6360000002 HC RX W HCPCS: Performed by: INTERNAL MEDICINE

## 2021-04-11 PROCEDURE — 2580000003 HC RX 258: Performed by: INTERNAL MEDICINE

## 2021-04-11 RX ORDER — FUROSEMIDE 10 MG/ML
20 INJECTION INTRAMUSCULAR; INTRAVENOUS 2 TIMES DAILY
Status: COMPLETED | OUTPATIENT
Start: 2021-04-11 | End: 2021-04-12

## 2021-04-11 RX ORDER — LIDOCAINE HYDROCHLORIDE 20 MG/ML
15 SOLUTION OROPHARYNGEAL
Status: DISCONTINUED | OUTPATIENT
Start: 2021-04-11 | End: 2021-04-11

## 2021-04-11 RX ORDER — POTASSIUM CHLORIDE 20 MEQ/1
20 TABLET, EXTENDED RELEASE ORAL
Status: DISCONTINUED | OUTPATIENT
Start: 2021-04-11 | End: 2021-04-13 | Stop reason: HOSPADM

## 2021-04-11 RX ADMIN — NYSTATIN 500000 UNITS: 100000 SUSPENSION ORAL at 21:00

## 2021-04-11 RX ADMIN — FUROSEMIDE 20 MG: 10 INJECTION, SOLUTION INTRAMUSCULAR; INTRAVENOUS at 11:27

## 2021-04-11 RX ADMIN — IPRATROPIUM BROMIDE AND ALBUTEROL 1 PUFF: 20; 100 SPRAY, METERED RESPIRATORY (INHALATION) at 00:36

## 2021-04-11 RX ADMIN — PYRIDOXINE HCL TAB 50 MG 50 MG: 50 TAB at 09:05

## 2021-04-11 RX ADMIN — IPRATROPIUM BROMIDE AND ALBUTEROL 1 PUFF: 20; 100 SPRAY, METERED RESPIRATORY (INHALATION) at 10:16

## 2021-04-11 RX ADMIN — LIDOCAINE HYDROCHLORIDE 15 ML: 20 SOLUTION ORAL; TOPICAL at 09:04

## 2021-04-11 RX ADMIN — BUDESONIDE AND FORMOTEROL FUMARATE DIHYDRATE 2 PUFF: 160; 4.5 AEROSOL RESPIRATORY (INHALATION) at 05:15

## 2021-04-11 RX ADMIN — NYSTATIN 500000 UNITS: 100000 SUSPENSION ORAL at 14:34

## 2021-04-11 RX ADMIN — OXYCODONE HYDROCHLORIDE AND ACETAMINOPHEN 1000 MG: 500 TABLET ORAL at 09:05

## 2021-04-11 RX ADMIN — REMDESIVIR 100 MG: 100 INJECTION, POWDER, LYOPHILIZED, FOR SOLUTION INTRAVENOUS at 11:22

## 2021-04-11 RX ADMIN — NYSTATIN 500000 UNITS: 100000 SUSPENSION ORAL at 09:04

## 2021-04-11 RX ADMIN — FUROSEMIDE 20 MG: 10 INJECTION, SOLUTION INTRAMUSCULAR; INTRAVENOUS at 20:59

## 2021-04-11 RX ADMIN — BUDESONIDE AND FORMOTEROL FUMARATE DIHYDRATE 2 PUFF: 160; 4.5 AEROSOL RESPIRATORY (INHALATION) at 17:12

## 2021-04-11 RX ADMIN — THIAMINE HCL TAB 100 MG 100 MG: 100 TAB at 11:24

## 2021-04-11 RX ADMIN — NYSTATIN 500000 UNITS: 100000 SUSPENSION ORAL at 17:17

## 2021-04-11 RX ADMIN — BENZONATATE 200 MG: 100 CAPSULE ORAL at 09:05

## 2021-04-11 RX ADMIN — IPRATROPIUM BROMIDE AND ALBUTEROL 1 PUFF: 20; 100 SPRAY, METERED RESPIRATORY (INHALATION) at 05:14

## 2021-04-11 RX ADMIN — AZITHROMYCIN MONOHYDRATE 500 MG: 250 TABLET ORAL at 09:05

## 2021-04-11 RX ADMIN — BENZONATATE 200 MG: 100 CAPSULE ORAL at 14:34

## 2021-04-11 RX ADMIN — BENZONATATE 200 MG: 100 CAPSULE ORAL at 21:00

## 2021-04-11 RX ADMIN — LIDOCAINE HYDROCHLORIDE 15 ML: 20 SOLUTION ORAL; TOPICAL at 06:05

## 2021-04-11 RX ADMIN — DEXAMETHASONE 6 MG: 2 TABLET ORAL at 09:05

## 2021-04-11 RX ADMIN — ENOXAPARIN SODIUM 40 MG: 40 INJECTION SUBCUTANEOUS at 21:00

## 2021-04-11 RX ADMIN — POTASSIUM CHLORIDE 20 MEQ: 20 TABLET, EXTENDED RELEASE ORAL at 11:31

## 2021-04-11 RX ADMIN — POTASSIUM CHLORIDE 20 MEQ: 20 TABLET, EXTENDED RELEASE ORAL at 17:16

## 2021-04-11 RX ADMIN — IPRATROPIUM BROMIDE AND ALBUTEROL 1 PUFF: 20; 100 SPRAY, METERED RESPIRATORY (INHALATION) at 17:12

## 2021-04-11 RX ADMIN — ASPIRIN 81 MG: 81 TABLET, FILM COATED ORAL at 09:05

## 2021-04-11 RX ADMIN — IPRATROPIUM BROMIDE AND ALBUTEROL 1 PUFF: 20; 100 SPRAY, METERED RESPIRATORY (INHALATION) at 22:36

## 2021-04-11 RX ADMIN — ENOXAPARIN SODIUM 40 MG: 40 INJECTION SUBCUTANEOUS at 09:05

## 2021-04-11 RX ADMIN — ZINC SULFATE 220 MG (50 MG) CAPSULE 50 MG: CAPSULE at 09:05

## 2021-04-11 NOTE — PLAN OF CARE
Problem: Airway Clearance - Ineffective  Goal: Achieve or maintain patent airway  Outcome: Met This Shift     Problem:  Body Temperature -  Risk of, Imbalanced  Goal: Ability to maintain a body temperature within defined limits  Outcome: Met This Shift     Problem: Loneliness or Risk for Loneliness  Goal: Demonstrate positive use of time alone when socialization is not possible  Outcome: Met This Shift

## 2021-04-11 NOTE — PLAN OF CARE
Problem: Pain:  Goal: Pain level will decrease  Description: Pain level will decrease  2/14/9559 [id number] by Romie Beck RN  Outcome: Met This Shift     Problem: Pain:  Goal: Control of acute pain  Description: Control of acute pain  Outcome: Met This Shift     Problem: Pain:  Goal: Control of chronic pain  Description: Control of chronic pain  Outcome: Met This Shift     Problem: Falls - Risk of:  Goal: Will remain free from falls  Description: Will remain free from falls  5/84/0774 [id number] by Romie Beck RN  Outcome: Met This Shift     Problem: Falls - Risk of:  Goal: Absence of physical injury  Description: Absence of physical injury  3/85/4381 [id number] by Romie Beck RN  Outcome: Met This Shift     Problem: Skin Integrity:  Goal: Will show no infection signs and symptoms  Description: Will show no infection signs and symptoms  Outcome: Met This Shift     Problem: Skin Integrity:  Goal: Absence of new skin breakdown  Description: Absence of new skin breakdown  Outcome: Met This Shift     Problem: Airway Clearance - Ineffective  Goal: Achieve or maintain patent airway  Outcome: Met This Shift     Problem: Gas Exchange - Impaired  Goal: Absence of hypoxia  Outcome: Met This Shift     Problem: Gas Exchange - Impaired  Goal: Promote optimal lung function  Outcome: Met This Shift     Problem: Breathing Pattern - Ineffective  Goal: Ability to achieve and maintain a regular respiratory rate  Outcome: Met This Shift     Problem: Body Temperature -  Risk of, Imbalanced  Goal: Ability to maintain a body temperature within defined limits  Outcome: Met This Shift     Problem: Body Temperature -  Risk of, Imbalanced  Goal: Will regain or maintain usual level of consciousness  Outcome: Met This Shift     Problem:  Body Temperature -  Risk of, Imbalanced  Goal: Complications related to the disease process, condition or treatment will be avoided or minimized  Outcome: Met This Shift     Problem: Nutrition Deficits  Goal: Optimize nutritional status  Outcome: Met This Shift     Problem: Risk for Fluid Volume Deficit  Goal: Maintain normal heart rhythm  Outcome: Met This Shift     Problem: Risk for Fluid Volume Deficit  Goal: Maintain absence of muscle cramping  Outcome: Met This Shift     Problem: Risk for Fluid Volume Deficit  Goal: Maintain normal serum potassium, sodium, calcium, phosphorus, and pH  Outcome: Met This Shift     Problem: Fatigue  Goal: Verbalize increase energy and improved vitality  Outcome: Met This Shift     Problem: Patient Education: Go to Patient Education Activity  Goal: Patient/Family Education  Outcome: Met This Shift

## 2021-04-11 NOTE — PROGRESS NOTES
pneumonia the patient possibly has a has a viral bronchitis/pneumonitis versus bacterial bronchitis. 4/4/2021  Patient seen examined on telemetry floor. Patient denies any chest or abdominal pain. Patient still complains of increased shortness of breath with activity and only about 25% improved from admission. Patient O2 saturation with activity yesterday on room air was 87%. Temperature was 97.8 with a heart rate of 100 and sinus rhythm. Blood pressure 120/70 with O2 sat today on 2 L nasal cannula was 91-96% at rest.  Patient lung sounds are improved today. 4/5/2021  Patient seen examined on telemetry floor. Patient still feels short of breath with any activity. Patient denies any chest or abdominal pain. There is no nausea or vomiting. Patient still with moist nonproductive cough. BUN/creatinine 13/0.6 with potassium increased to 5.3 today. CBC is normal.  Temperature 97.9 with heart rate 72 with blood pressure currently 141/75. O2 sat 95% on 2 L nasal cannula. Patient with persistent coarse breath sounds with mild scattered occasional rhonchi. 4/6/2021  Patient seen examined on telemetry floor. Case discussed with patient's nurse at the bedside today. Patient's heart rate dramatically increased today with activity and with patient having the oxygen off for short period time. Patient's O2 sat did drop into the 80s on room air. Patient's heart rate improved with putting oxygen back on. Currently the patient denies any problem chest pain, abdominal pain, nausea/vomiting or unusual shortness of breath at rest.  Patient little bit more symptomatic with dyspnea with exertion today. Patient lung sounds have decreased coarse breath sounds without wheezing or rhonchi today. BUN/creatinine was 19/0.7. Temperature 98.3 with heart rate 83. Patient thought to be in atrial fibrillation by nursing today. Blood pressure 123/73 with O2 sat 97% on 4 L nasal cannula.     4/7/2021  Patient seen patient have a nonproductive cough. BUN/creatinine 12/0.6. Transaminases mildly elevated. Temperatures normal at 97.2 with heart rate 86 and blood pressure currently 141/73. O2 sat 93% on 3 L nasal cannula at rest.    4/11/2021  Patient seen examined on [facility]. Patient does not feel as good today - just overall feels weak patient still complains of mouth sores which not improved with Mycostatin. Viscous lidocaine ordered yesterday afternoon. Patient also complains of some increasing lower leg edema along with increased drainage from her right eye. Patient denies any chest or abdominal pain. Temperature 97.2 with heart rate of 79. Blood pressure currently 130/69. O2 sats 95% on 2 L nasal cannula at rest.  BUN/creatinine 15/0.6 with normal liver enzymes and electrolytes. IV fluids will be stopped and short courses IV Lasix. Also changed to Magic mouthwash for mouth sores. O2 sat is improved today. Past Medical History:    Past Medical History:   Diagnosis Date    AAA (abdominal aortic aneurysm) ([facility] 2014    Arthritis     Blood circulation, collateral     thoracic outlet syndrome    CAD (coronary artery disease)     mi    COPD (chronic obstructive pulmonary disease) (HCC)     Emphysema with chronic bronchitis (HCC)     GERD (gastroesophageal reflux disease)     Hypothyroidism     MI (myocardial infarction) ([facility]     Substance abuse ([facility]     tobacco    Thoracic outlet syndrome     TIA (transient ischemic attack)     Unspecified cerebral artery occlusion with cerebral infarction      Past Surgical History:    Past Surgical History:   Procedure Laterality Date    APPENDECTOMY      OVARY REMOVAL      also tube on right removed    TONSILLECTOMY AND ADENOIDECTOMY      TUBAL LIGATION Bilateral     UTERINE SUSPENSION         Medications Prior to Admission:    @  Prior to Admission medications    Medication Sig Start Date End Date Taking?  Authorizing Provider   Calcium Carbonate Antacid (TUMS ULTRA 1000) 1000 MG CHEW Take 1,000 mg by mouth 3 times daily as needed (Reflux)   Yes Historical Provider, MD   ibuprofen (ADVIL;MOTRIN) 200 MG tablet Take 400 mg by mouth every 6 hours as needed for Pain   Yes Historical Provider, MD   Cholecalciferol (VITAMIN D3) 50 MCG (2000 UT) CAPS Take 2,000 Units by mouth daily   Yes Historical Provider, MD   aspirin 81 MG EC tablet Take 1 tablet by mouth daily 12/5/19  Yes Rayna Thomason DO       Allergies:  Penicillins, Tetanus toxoids, Ciprofloxacin, Effexor [venlafaxine], Kiwi extract, Betadine [povidone iodine], Codeine, Demerol, Sulfa antibiotics, and Tape [place] tape]    Social History:   Social History     Socioeconomic History    Marital status:       Spouse name: Not on file    Number of children: Not on file    Years of education: Not on file    Highest education level: Not on file   Occupational History    Not on file   Social Needs    Financial resource strain: Not on file    Food insecurity     Worry: Not on file     Inability: Not on file    Transportation needs     Medical: Not on file     Non-medical: Not on file   Tobacco Use    Smoking status: Current Some Day Smoker     Packs/day: 1.00     Years: 51.00     Pack years: 51.00     Types: Cigarettes    Smokeless tobacco: Never Used    Tobacco comment: IN THE PROCESS OF QUITTING   Substance and Sexual Activity    Alcohol use: Not Currently     Comment: occasional    Drug use: No    Sexual activity: Not on file   Lifestyle    Physical activity     Days per week: Not on file     Minutes per session: Not on file    Stress: Not on file   Relationships    Social connections     Talks on phone: Not on file     Gets together: Not on file     Attends Christian service: Not on file     Active member of club or organization: Not on file     Attends meetings of clubs or organizations: Not on file     Relationship status: Not on file    Intimate partner violence     Fear of current or ex partner: Not on file     Emotionally abused: Not on file     Physically abused: Not on file     Forced sexual activity: Not on file   Other Topics Concern    Not on file   Social History Narrative    Not on file       Family History:   Family History   Problem Relation Age of Onset    Liver Disease Father     Cancer Father     Heart Disease Father     Cancer Other         breast    Cancer Mother         breast    Heart Disease Mother     Dementia Mother     Diabetes Mother        REVIEW OF SYSTEMS:    Gen: Patient denies any lightheadedness or dizziness. No LOC or syncope. No fevers or chills. HEENT: No earache, sore throat or nasal congestion. Resp: +cough with scant white productive sputum, no hemoptysis   Cardiac: Denies chest pain, SOB, diaphoresis or palpitations. GI: No nausea, vomiting, diarrhea or constipation. No melena or hematochezia. : No urinary complaints, dysuria, hematuria or frequency. MSK: No extremity weakness, paralysis or paresthesias. PHYSICAL EXAM:    Vitals:  /69   Pulse 79   Temp 97.2 °F (36.2 °C) (Infrared)   Resp 20   Ht 5' 6\" (1.676 m)   Wt 148 lb (67.1 kg)   SpO2 95%   BMI 23.89 kg/m²     General:  This is a 73 y.o. yo female who is alert and oriented in NAD  HEENT:  Head is normocephalic and atraumatic, PERRLA, EOMI, mucus membranes moist with no pharyngeal erythema or exudate. Neck:  Supple with no carotid bruits, JVD or thyromegaly.   No cervical adenopathy  CV:  Regular rate and rhythm, no murmurs  Lungs: Coarse decreased breath sounds to auscultation bilaterally with mild scattered right lung crackles with no wheezes, rhonchi  Abdomen:  Soft, nontender, nondistended, bowel sounds present  Extremities:  No edema, peripheral pulses intact bilaterally  Neuro:  Cranial nerves II-XII grossly intact; motor and sensory function intact with no focal deficits  Skin:  No rashes, lesions or wounds    DATA:  CBC with Differential:    Lab Results Component Value Date    WBC 8.6 04/08/2021    RBC 4.12 04/08/2021    HGB 12.8 04/08/2021    HCT 38.5 04/08/2021     04/08/2021    MCV 93.4 04/08/2021    MCH 31.1 04/08/2021    MCHC 33.2 04/08/2021    RDW 13.2 04/08/2021    LYMPHOPCT 8.1 04/08/2021    MONOPCT 4.1 04/08/2021    MYELOPCT 2.6 04/07/2021    BASOPCT 0.1 04/08/2021    MONOSABS 0.35 04/08/2021    LYMPHSABS 0.70 04/08/2021    EOSABS 0.00 04/08/2021    BASOSABS 0.01 04/08/2021     CMP:    Lab Results   Component Value Date     04/11/2021    K 3.5 04/11/2021    K 3.4 04/02/2021     04/11/2021    CO2 23 04/11/2021    BUN 15 04/11/2021    CREATININE 0.6 04/11/2021    GFRAA >60 04/11/2021    LABGLOM >60 04/11/2021    GLUCOSE 85 04/11/2021    GLUCOSE 83 02/03/2011    PROT 5.2 04/11/2021    LABALBU 2.4 04/11/2021    LABALBU 4.1 02/03/2011    CALCIUM 7.4 04/11/2021    BILITOT <0.2 04/11/2021    ALKPHOS 72 04/11/2021    AST 26 04/11/2021    ALT 30 04/11/2021     Magnesium:    Lab Results   Component Value Date    MG 1.8 04/03/2021     Phosphorus:    Lab Results   Component Value Date    PHOS 4.0 07/02/2017     PT/INR:    Lab Results   Component Value Date    PROTIME 12.1 04/02/2021    INR 1.0 04/02/2021     Troponin:    Lab Results   Component Value Date    TROPONINI <0.01 04/02/2021     U/A:    Lab Results   Component Value Date    COLORU Straw 12/03/2019    PROTEINU Negative 12/03/2019    PHUR 7.0 12/03/2019    WBCUA 0-1 12/03/2019    RBCUA NONE 12/03/2019    BACTERIA NONE 12/03/2019    CLARITYU Clear 12/03/2019    SPECGRAV 1.010 12/03/2019    LEUKOCYTESUR TRACE 12/03/2019    UROBILINOGEN 0.2 12/03/2019    BILIRUBINUR Negative 12/03/2019    BLOODU TRACE 12/03/2019    GLUCOSEU Negative 12/03/2019     ABG:  No results found for: PH, PCO2, PO2, HCO3, BE, THGB, TCO2, O2SAT  HgBA1c:    Lab Results   Component Value Date    LABA1C 5.2 07/02/2017     FLP:    Lab Results   Component Value Date    TRIG 69 04/03/2021    HDL 50 04/03/2021    LDLCALC 58 Mycostatin suspension 5 cc swish and swallow 4 times daily    Magic mouthwash 5 cc 4 times daily as needed swish and swallow  Stop IV fluids  Lasix 20 mg IV push twice daily x3 doses  KCl 20 equivalents 3 times daily with meals    Jesús Hartley D.O.  4/11/2021  11:00 AM

## 2021-04-11 NOTE — PROGRESS NOTES
Patient complaining of inability to eat due to mouth sores, states nystatin is not helping. Dr. Raulito Pickens (covering for Dr. Trish Apgar) notified, see new order.

## 2021-04-12 LAB
ALBUMIN SERPL-MCNC: 2.8 G/DL (ref 3.5–5.2)
ALP BLD-CCNC: 77 U/L (ref 35–104)
ALT SERPL-CCNC: 34 U/L (ref 0–32)
ANION GAP SERPL CALCULATED.3IONS-SCNC: 8 MMOL/L (ref 7–16)
AST SERPL-CCNC: 34 U/L (ref 0–31)
BASOPHILS ABSOLUTE: 0.02 E9/L (ref 0–0.2)
BASOPHILS RELATIVE PERCENT: 0.2 % (ref 0–2)
BILIRUB SERPL-MCNC: 0.2 MG/DL (ref 0–1.2)
BLOOD CULTURE, ROUTINE: NORMAL
BUN BLDV-MCNC: 17 MG/DL (ref 8–23)
CALCIUM SERPL-MCNC: 7.9 MG/DL (ref 8.6–10.2)
CHLORIDE BLD-SCNC: 105 MMOL/L (ref 98–107)
CO2: 26 MMOL/L (ref 22–29)
CREAT SERPL-MCNC: 0.7 MG/DL (ref 0.5–1)
CULTURE, BLOOD 2: NORMAL
D DIMER: 925 NG/ML DDU
EOSINOPHILS ABSOLUTE: 0.01 E9/L (ref 0.05–0.5)
EOSINOPHILS RELATIVE PERCENT: 0.1 % (ref 0–6)
GFR AFRICAN AMERICAN: >60
GFR NON-AFRICAN AMERICAN: >60 ML/MIN/1.73
GLUCOSE BLD-MCNC: 99 MG/DL (ref 74–99)
HCT VFR BLD CALC: 34.1 % (ref 34–48)
HEMOGLOBIN: 11.5 G/DL (ref 11.5–15.5)
IMMATURE GRANULOCYTES #: 0.22 E9/L
IMMATURE GRANULOCYTES %: 2.6 % (ref 0–5)
LYMPHOCYTES ABSOLUTE: 2.4 E9/L (ref 1.5–4)
LYMPHOCYTES RELATIVE PERCENT: 28.5 % (ref 20–42)
MAGNESIUM: 2 MG/DL (ref 1.6–2.6)
MCH RBC QN AUTO: 30.8 PG (ref 26–35)
MCHC RBC AUTO-ENTMCNC: 33.7 % (ref 32–34.5)
MCV RBC AUTO: 91.4 FL (ref 80–99.9)
MONOCYTES ABSOLUTE: 0.83 E9/L (ref 0.1–0.95)
MONOCYTES RELATIVE PERCENT: 9.9 % (ref 2–12)
NEUTROPHILS ABSOLUTE: 4.93 E9/L (ref 1.8–7.3)
NEUTROPHILS RELATIVE PERCENT: 58.7 % (ref 43–80)
PDW BLD-RTO: 13.2 FL (ref 11.5–15)
PLATELET # BLD: 494 E9/L (ref 130–450)
PMV BLD AUTO: 8.8 FL (ref 7–12)
POTASSIUM SERPL-SCNC: 4.4 MMOL/L (ref 3.5–5)
RBC # BLD: 3.73 E12/L (ref 3.5–5.5)
SODIUM BLD-SCNC: 139 MMOL/L (ref 132–146)
TOTAL PROTEIN: 5.9 G/DL (ref 6.4–8.3)
WBC # BLD: 8.4 E9/L (ref 4.5–11.5)

## 2021-04-12 PROCEDURE — 6370000000 HC RX 637 (ALT 250 FOR IP): Performed by: INTERNAL MEDICINE

## 2021-04-12 PROCEDURE — 36415 COLL VENOUS BLD VENIPUNCTURE: CPT

## 2021-04-12 PROCEDURE — 2580000003 HC RX 258: Performed by: INTERNAL MEDICINE

## 2021-04-12 PROCEDURE — 85025 COMPLETE CBC W/AUTO DIFF WBC: CPT

## 2021-04-12 PROCEDURE — 83735 ASSAY OF MAGNESIUM: CPT

## 2021-04-12 PROCEDURE — 1200000000 HC SEMI PRIVATE

## 2021-04-12 PROCEDURE — 85378 FIBRIN DEGRADE SEMIQUANT: CPT

## 2021-04-12 PROCEDURE — 97110 THERAPEUTIC EXERCISES: CPT

## 2021-04-12 PROCEDURE — 6360000002 HC RX W HCPCS: Performed by: INTERNAL MEDICINE

## 2021-04-12 PROCEDURE — 97530 THERAPEUTIC ACTIVITIES: CPT

## 2021-04-12 PROCEDURE — 94640 AIRWAY INHALATION TREATMENT: CPT

## 2021-04-12 PROCEDURE — 2500000003 HC RX 250 WO HCPCS: Performed by: INTERNAL MEDICINE

## 2021-04-12 PROCEDURE — 80053 COMPREHEN METABOLIC PANEL: CPT

## 2021-04-12 PROCEDURE — 2700000000 HC OXYGEN THERAPY PER DAY

## 2021-04-12 RX ORDER — FUROSEMIDE 10 MG/ML
20 INJECTION INTRAMUSCULAR; INTRAVENOUS ONCE
Status: COMPLETED | OUTPATIENT
Start: 2021-04-12 | End: 2021-04-12

## 2021-04-12 RX ADMIN — ENOXAPARIN SODIUM 40 MG: 40 INJECTION SUBCUTANEOUS at 20:29

## 2021-04-12 RX ADMIN — OXYCODONE HYDROCHLORIDE AND ACETAMINOPHEN 1000 MG: 500 TABLET ORAL at 09:03

## 2021-04-12 RX ADMIN — REMDESIVIR 100 MG: 100 INJECTION, POWDER, LYOPHILIZED, FOR SOLUTION INTRAVENOUS at 12:41

## 2021-04-12 RX ADMIN — BENZONATATE 200 MG: 100 CAPSULE ORAL at 12:41

## 2021-04-12 RX ADMIN — FUROSEMIDE 20 MG: 10 INJECTION, SOLUTION INTRAMUSCULAR; INTRAVENOUS at 09:03

## 2021-04-12 RX ADMIN — FUROSEMIDE 20 MG: 10 INJECTION, SOLUTION INTRAMUSCULAR; INTRAVENOUS at 18:57

## 2021-04-12 RX ADMIN — PYRIDOXINE HCL TAB 50 MG 50 MG: 50 TAB at 09:03

## 2021-04-12 RX ADMIN — NYSTATIN 500000 UNITS: 100000 SUSPENSION ORAL at 18:56

## 2021-04-12 RX ADMIN — Medication 5 ML: at 09:01

## 2021-04-12 RX ADMIN — POTASSIUM CHLORIDE 20 MEQ: 20 TABLET, EXTENDED RELEASE ORAL at 12:40

## 2021-04-12 RX ADMIN — NYSTATIN 500000 UNITS: 100000 SUSPENSION ORAL at 09:02

## 2021-04-12 RX ADMIN — POTASSIUM CHLORIDE 20 MEQ: 20 TABLET, EXTENDED RELEASE ORAL at 18:56

## 2021-04-12 RX ADMIN — IPRATROPIUM BROMIDE AND ALBUTEROL 1 PUFF: 20; 100 SPRAY, METERED RESPIRATORY (INHALATION) at 23:11

## 2021-04-12 RX ADMIN — BUDESONIDE AND FORMOTEROL FUMARATE DIHYDRATE 2 PUFF: 160; 4.5 AEROSOL RESPIRATORY (INHALATION) at 07:20

## 2021-04-12 RX ADMIN — IPRATROPIUM BROMIDE AND ALBUTEROL 1 PUFF: 20; 100 SPRAY, METERED RESPIRATORY (INHALATION) at 18:34

## 2021-04-12 RX ADMIN — ACETAMINOPHEN 500 MG: 500 TABLET ORAL at 09:21

## 2021-04-12 RX ADMIN — IPRATROPIUM BROMIDE AND ALBUTEROL 1 PUFF: 20; 100 SPRAY, METERED RESPIRATORY (INHALATION) at 12:05

## 2021-04-12 RX ADMIN — POTASSIUM CHLORIDE 20 MEQ: 20 TABLET, EXTENDED RELEASE ORAL at 09:04

## 2021-04-12 RX ADMIN — ASPIRIN 81 MG: 81 TABLET, FILM COATED ORAL at 09:03

## 2021-04-12 RX ADMIN — Medication 5 ML: at 12:00

## 2021-04-12 RX ADMIN — ZINC SULFATE 220 MG (50 MG) CAPSULE 50 MG: CAPSULE at 09:02

## 2021-04-12 RX ADMIN — IPRATROPIUM BROMIDE AND ALBUTEROL 1 PUFF: 20; 100 SPRAY, METERED RESPIRATORY (INHALATION) at 07:20

## 2021-04-12 RX ADMIN — THIAMINE HCL TAB 100 MG 100 MG: 100 TAB at 09:02

## 2021-04-12 RX ADMIN — ENOXAPARIN SODIUM 40 MG: 40 INJECTION SUBCUTANEOUS at 09:02

## 2021-04-12 RX ADMIN — AZITHROMYCIN MONOHYDRATE 500 MG: 250 TABLET ORAL at 09:02

## 2021-04-12 RX ADMIN — BENZONATATE 200 MG: 100 CAPSULE ORAL at 20:30

## 2021-04-12 RX ADMIN — BUDESONIDE AND FORMOTEROL FUMARATE DIHYDRATE 2 PUFF: 160; 4.5 AEROSOL RESPIRATORY (INHALATION) at 18:34

## 2021-04-12 RX ADMIN — BENZONATATE 200 MG: 100 CAPSULE ORAL at 09:03

## 2021-04-12 RX ADMIN — DEXAMETHASONE 6 MG: 2 TABLET ORAL at 09:03

## 2021-04-12 RX ADMIN — NYSTATIN 500000 UNITS: 100000 SUSPENSION ORAL at 12:41

## 2021-04-12 RX ADMIN — NYSTATIN 500000 UNITS: 100000 SUSPENSION ORAL at 20:30

## 2021-04-12 ASSESSMENT — PAIN SCALES - GENERAL
PAINLEVEL_OUTOF10: 0
PAINLEVEL_OUTOF10: 1

## 2021-04-12 NOTE — PROGRESS NOTES
Physical Therapy Treatment Note    Room #:  0417/0384-41  Patient Name: Demetra Cheung  YOB: 1950  MRN: [id number]    Referring Provider:   Chriss Reyna DO      Date of Service: 4/12/2021    Evaluating Physical Therapist: Mary Lopez, PT #[id number]      Diagnosis:   COPD exacerbation ([facility] [J44.1]       Patient Active Problem List   Diagnosis    COPD [facility]chronic obstructive [facility] disease) ([facility]    Anxiety    CAD (coronary artery disease)    Tobacco abuse    Hypothyroidism    GERD (gastroesophageal reflux disease)    Dyslipidemia    TIA (transient ischemic attack)    Thoracic outlet syndrome    CAP (community acquired pneumonia)    Syncope    Closed compression fracture of lumbar vertebra ([facility]    COPD exacerbation ([facility]       Tentative placement recommendation:  Home Health Physical Therapy if goals met   Equipment recommendation: To be determined      Prior Level of Function: Patient ambulated independently , occassionally with a cane when foot hurts  Rehab Potential: good  for baseline    Past medical history:   Past Medical History:   Diagnosis Date    AAA (abdominal aortic aneurysm) ([facility] 2014    Arthritis     Blood circulation, collateral     thoracic outlet syndrome    CAD (coronary artery disease)     mi    COPD (chronic obstructive pulmonary disease) ([facility]     Emphysema with chronic bronchitis (HCC)     GERD (gastroesophageal reflux disease)     Hypothyroidism     MI (myocardial infarction) ([facility]     Substance abuse ([facility]     tobacco    Thoracic outlet syndrome     TIA (transient ischemic attack)     Unspecified cerebral artery occlusion with cerebral infarction      Past Surgical History:   Procedure Laterality Date    APPENDECTOMY      OVARY REMOVAL      also tube on right removed    TONSILLECTOMY AND ADENOIDECTOMY      TUBAL LIGATION Bilateral     UTERINE SUSPENSION         Precautions:  Activity as tolerated, falls, alarm and O2 , 2L, covid positive SUBJECTIVE:    Social history: Patient lives with son  And his wife in a ranch home  with 1+1+1  to enter with Rail  Tub shower grab bars    Equipment owned: [employer] chair and Grab bars,      [address]   How much difficulty turning over in bed?: A Little  How much difficulty sitting down on / standing up from a chair with arms?: A Little  How much difficulty moving from lying on back to sitting on side of bed?: A Little  How much help from another person moving to and from a bed to a chair?: A Little  How much help from another person needed to walk in hospital room?: A Little  How much help from another person for climbing 3-5 steps with a railing?: A Lot  AM-PAC Inpatient Mobility Raw Score : 17  AM-PAC Inpatient T-Scale Score : 42.13  Mobility Inpatient CMS 0-100% Score: 50.57  Mobility Inpatient CMS G-Code Modifier : CK    Nursing cleared patient for PT treatment. pt ok for function      OBJECTIVE;   Initial Evaluation  Date: 4/5/2021 Treatment Date:    4/12/2021   Short Term/ Long Term   Goals   Was pt agreeable to Eval/treatment? Yes  yes To be met in 4 days   Pain level   0/10   0/10    Bed Mobility    Rolling: Independent   Supine to sit: Supervision    Sit to supine: Supervision    Scooting: Supervision   Rolling: Supervision    Supine to sit: Supervision    Sit to supine: Not assessed    Scooting: Supervision     Rolling: Independent   Supine to sit: Independent   Sit to supine: Independent   Scooting: Independent    Transfers Sit to stand: Supervision   Sit to stand: Supervision  cuing for safety and pace     Sit to stand: Independent    Ambulation    2x75 feet using  IV pole with Supervision    , assistance with O2 line 4 x 20 feet using  no device with Supervision    cuing for pacing, pursed lip breathing, and safety.      150 feet using  no device with Independent    Stair negotiation: ascended and descended   Not assessed       ROM Within functional limits      Strength BUE:  refer to OT eval  RLE:  4+/5  LLE:  4+/5  Increase strength in affected mm groups by 1/3 grade   Balance Sitting EOB:  good   Dynamic Standing:  fair + with IV pole Sitting EOB: good  Dynamic Standing: good      Dynamic Standing: good      Patient is Alert & Oriented x person, place, time and situation and follows directions   Sensation:  Patient  denies numbness and tingling bilateral lower extremities    Edema:  no   Endurance: fair     Vitals: 2 liters nasal cannula   Blood Pressure at rest  Blood Pressure during session    Heart Rate at rest  Heart Rate during session    SPO2 at rest 95%  SPO2 during session 87-90%     Patient education  Patient educated on role of Physical Therapy, risks of immobility, safety and plan of care, energy conservation, importance of positional changes for oxygen exchange,  importance of mobility while in hospital , purse lip breathing, ankle pumps, quad set and glut set for edema control, blood clot prevention, safety  and O2 line management and safety    Patient was educated and facilitated on techniques to increase safety and independence with bed mobility, balance, functional transfers, and functional mobility. Patient was explained the the benefits of mobility and risks of immobility. Patient response to education:   Pt verbalized understanding Pt demonstrated skill Pt requires further education in this area   Yes Partial Yes      Treatment:  Patient practiced and was instructed/facilitated in the following treatment:   Patient Sat edge of bed 10 minutes with Supervision  to increase dynamic sitting balance and activity tolerance. Patient educated on pursed lip breathing, using the spirometer on increasing her lung capacity, pacing, and energy conservation. pt amb in room 4 x 20 ft and back to the chair. Pt performed seated exercises and then elevated her legs on the bed due to edema.   Therapeutic Exercises:  ankle pumps, hip abduction/adduction, long arc quad and seated marching  x  10-15reps. 2 sets. Pulse ox monitored throughout with rests provided and cues for technique and breathing. At end of session, patient in chair with  call light and phone within reach,  all lines and tubes intact, nursing notified. Patient would benefit from continued skilled Physical Therapy to improve functional independence and quality of life. Patient's/ family goals   home      ASSESSMENT: Patient exhibits decreased strength, balance and endurance impairing gait distance and tolerance to activity . Closely monitoring  SPO2 levels. Pt with increased activity with decreased pulse ox with minimal activity. Her SPO2 dropped to 87% after ambulation and recovered to 90% in 12 seconds. Pt able to perform gait activities with no loss or unsteadiness noted but limited due to shortness of breath. Plan of Care:     -Standing Balance: Perform strengthening exercises in standing to promote motor control with or without upper extremity support   -Gait: Gait training and Standing activities to improve: base of support, weight shift, weight bearing    -Endurance: Utilize Supervised activities to increase level of endurance to allow for safe functional mobility including transfers and gait     Patient and or family understand(s) diagnosis, prognosis, and plan of care. Frequency of treatments: Patient will be seen  daily. Time in  10:02  Time out  10:40    Total Treatment Time 38 minutes      CPT codes:  Therapeutic activities (16027)   23 minutes  2 unit(s)  Therapeutic exercises (01747)   15 minutes  1 unit(s)    Vamsi Rodas  [facility]  LIC # [id number] yes

## 2021-04-12 NOTE — PROGRESS NOTES
Physician Progress Note      Mark Dickinson  CSN #:                  [id number]  :                       1950  ADMIT DATE:       2021 1:40 PM  DISCH DATE:  RESPONDING  PROVIDER #:        LENORE MARES DO          QUERY TEXT:    Pt admitted with acute hypoxic respiratory failure. Pt noted to have   documentation of Probable right lower lobe pneumonia. If possible, after   further study, please document in the progress notes and discharge summary if   you are evaluating and/or treating any of the following: The medical record reflects the following:  Risk Factors: per documentation diagnosis of COPD exacerbation, acute   respiratory failure, pneumonia  Clinical Indicators: per H and P Probable right lower lobe pneumonia, possibly   underlying viral versus bacterial pneumonia in right lung, per medicine   progress note With mild leukopenia and a CAT scan showing no definitive   pneumonia the patient possibly has a has a viral bronchitis/pneumonitis versus   bacterial bronchitis, procalcitonin .04, wbc 3.2, 3.1  Treatment: CT scan, respiratory culture, supplemental oxygen, IV Solumedrol,   Duoneb, Pulmicort, PO Zithromax    Thank you  Aga Pal RN CCDS  [phone]  Options provided:  -- Gram negative pneumonia  -- Gram positive pneumonia  -- Bacterial pneumonia  -- Viral pneumonia  -- bacterial bronchitis  -- Viral bronchitis  -- Other - I will add my own diagnosis  -- Disagree - Not applicable / Not valid  -- Disagree - Clinically unable to determine / Unknown  -- Refer to Clinical Documentation Reviewer    PROVIDER RESPONSE TEXT:    This patient has viral pneumonia.     Query created by: Carey Steinberg on 2021 2:20 PM      Electronically signed by:  Navi Camargo DO 2021 11:09 AM

## 2021-04-12 NOTE — PLAN OF CARE
Problem: Pain:  Goal: Pain level will decrease  Description: Pain level will decrease  Outcome: Met This Shift  Goal: Control of acute pain  Description: Control of acute pain  Outcome: Met This Shift  Goal: Control of chronic pain  Description: Control of chronic pain  Outcome: Met This Shift     Problem: Falls - Risk of:  Goal: Will remain free from falls  Description: Will remain free from falls  Outcome: Met This Shift  Goal: Absence of physical injury  Description: Absence of physical injury  Outcome: Met This Shift     Problem: Skin Integrity:  Goal: Will show no infection signs and symptoms  Description: Will show no infection signs and symptoms  Outcome: Met This Shift  Goal: Absence of new skin breakdown  Description: Absence of new skin breakdown  Outcome: Met This Shift     Problem: Airway Clearance - Ineffective  Goal: Achieve or maintain patent airway  4/49/8451 [id number] by Bjorn Carmona RN  Outcome: Met This Shift     Problem: Gas Exchange - Impaired  Goal: Absence of hypoxia  Outcome: Met This Shift     Problem: Gas Exchange - Impaired  Goal: Promote optimal lung function  Outcome: Met This Shift     Problem: Breathing Pattern - Ineffective  Goal: Ability to achieve and maintain a regular respiratory rate  Outcome: Met This Shift     Problem: Body Temperature -  Risk of, Imbalanced  Goal: Ability to maintain a body temperature within defined limits  3/01/4052 [id number] by Bjorn Carmona RN  Outcome: Met This Shift     Problem: Body Temperature -  Risk of, Imbalanced  Goal: Will regain or maintain usual level of consciousness  Outcome: Met This Shift     Problem:  Body Temperature -  Risk of, Imbalanced  Goal: Complications related to the disease process, condition or treatment will be avoided or minimized  Outcome: Met This Shift     Problem: Isolation Precautions - Risk of Spread of Infection  Goal: Prevent transmission of infection  Outcome: Met This Shift     Problem: Nutrition Deficits  Goal: Optimize nutritional status  Outcome: Met This Shift     Problem: Risk for Fluid Volume Deficit  Goal: Maintain normal heart rhythm  Outcome: Met This Shift     Problem: Risk for Fluid Volume Deficit  Goal: Maintain absence of muscle cramping  Outcome: Met This Shift     Problem: Risk for Fluid Volume Deficit  Goal: Maintain normal serum potassium, sodium, calcium, phosphorus, and pH  Outcome: Met This Shift     Problem: Loneliness or Risk for Loneliness  Goal: Demonstrate positive use of time alone when socialization is not possible  8/27/9113 [id number] by Liz Ruiz RN  Outcome: Met This Shift     Problem: Fatigue  Goal: Verbalize increase energy and improved vitality  Outcome: Met This Shift     Problem: Patient Education: Go to Patient Education Activity  Goal: Patient/Family Education  Outcome: Met This Shift

## 2021-04-12 NOTE — PROGRESS NOTES
Department of Internal Medicine        CHIEF COMPLAINT: Shortness of breath, fever/chills body aches and nonproductive cough. Reason for Admission:   Required pneumonia right lower lobe    HISTORY OF PRESENT ILLNESS:      The patient is a 73 y.o. female who presents with increased shortness of breath, fever/chills, scant productive cough and generalized body aches. Patient denied chest pain today but did have some chest tightness yesterday that resolved. There is no associated nausea/vomiting or diarrhea. Patient has a scant productive white sputum off and on. Patient also complains of cephalgia. Patient's had increased shortness of breath with activity. Symptoms started about 5 days ago. Patient states she has not received her Covid vaccine with the COVID-19 antigen being negative. WBC is 3.2 with potassium 3.4. Temperature is normal 98.1 with a heart rate of 100 blood pressure 117/56. O2 sat was 95% on 2 L. Patient was ambulated short distance in the ED and the O2 sat did go down as low as 87% on room air. Chest x-ray show pronounced emphysema along with right greater than left base atelectasis or infiltrates. Patient was mated the hospital because of acute hypoxic respiratory failure and possibly underlying viral versus bacterial pneumonia in right lung. 4/3/2021  Patient seen examined on telemetry floor. Patient feels little bit better today. Patient still has a harsh nonproductive cough. Lung sounds still have some mild expiratory wheezing and coarse breath sounds. Patient still short of breath with activity. BUN/creatinine is 12/0.7 with potassium improved to 4.2. WBC still low at 3.1 with hemoglobin 1.6. Temperature ranges 98.699.1 with heart rate 94. Blood pressure ranges 90/50 5113/55. O2 sat 94% on 2 L nasal cannula at rest.  CT the chest showed extensive emphysematous changes with scarring or atelectasis in the bases.   With mild leukopenia and a CAT scan showing no definitive pneumonia the patient possibly has a has a viral bronchitis/pneumonitis versus bacterial bronchitis. 4/4/2021  Patient seen examined on telemetry floor. Patient denies any chest or abdominal pain. Patient still complains of increased shortness of breath with activity and only about 25% improved from admission. Patient O2 saturation with activity yesterday on room air was 87%. Temperature was 97.8 with a heart rate of 100 and sinus rhythm. Blood pressure 120/70 with O2 sat today on 2 L nasal cannula was 91-96% at rest.  Patient lung sounds are improved today. 4/5/2021  Patient seen examined on telemetry floor. Patient still feels short of breath with any activity. Patient denies any chest or abdominal pain. There is no nausea or vomiting. Patient still with moist nonproductive cough. BUN/creatinine 13/0.6 with potassium increased to 5.3 today. CBC is normal.  Temperature 97.9 with heart rate 72 with blood pressure currently 141/75. O2 sat 95% on 2 L nasal cannula. Patient with persistent coarse breath sounds with mild scattered occasional rhonchi. 4/6/2021  Patient seen examined on telemetry floor. Case discussed with patient's nurse at the bedside today. Patient's heart rate dramatically increased today with activity and with patient having the oxygen off for short period time. Patient's O2 sat did drop into the 80s on room air. Patient's heart rate improved with putting oxygen back on. Currently the patient denies any problem chest pain, abdominal pain, nausea/vomiting or unusual shortness of breath at rest.  Patient little bit more symptomatic with dyspnea with exertion today. Patient lung sounds have decreased coarse breath sounds without wheezing or rhonchi today. BUN/creatinine was 19/0.7. Temperature 98.3 with heart rate 83. Patient thought to be in atrial fibrillation by nursing today. Blood pressure 123/73 with O2 sat 97% on 4 L nasal cannula.     4/7/2021  Patient seen examined on telemetry floor. Patient had a bad morning because of complaints of fever/chills, increased cough, dizziness and weakness. Patient had temp of 101.5 early this morning. Blood pressure went as low as 80/58 but currently 105/69. O2 sat 95% on 2 L currently but was as low as 90% on 4 L nasal cannula earlier this morning. Patient lung sounds have increased congestion with expiratory wheezing with scattered crackles. Patient will have repeat chest x-ray PA and lateral along with stat blood cultures along with CBC and BMP now. 4/8/2021  Patient seen examined on [facility]. Patient feels fairly good today. Patient currently on 4-5 L nasal cannula and is currently proning. Patient denies any chest or abdominal pain. Patient does have shortness of breath with activity. Patient COVID-19 antigen result was positive from yesterday early afternoon. Temperature 97.3 with heart rate of 76. Blood pressure 115/64. O2 sat 90% on 4 L nasal cannula at rest.  Patient switched over to Combivent and Symbicort inhaler along with consulting pharmacist for remdesivir and starting p.o. Decadron. Patient started on oral vitamin C, zinc, B6 and thiamine. 7 serology every 2 hours while awake. 4/9/2021  Patient seen examined on [facility]. Patient states she is feeling better today. Patient has been turned on 3 L nasal cannula from 4 L. Patient also complains of soreness in her mouth with examination showing some white plaques and mild erythema. Patient denies any chest or abdominal pain. There is no nausea vomiting. BUN/creatinine 20/0.6. D-dimer 2200 with a fibrinogen 468. Temperature 97.3 with heart rate of 95. Temperature 97.3 with heart rate 95 with blood pressure currently 124/88. O2 sat 97% on 3 L nasal cannula. Start statin suspension. 4/10/2021  Patient seen examined on [facility]. Patient states she feels better today. Patient denies any chest pain or abdominal pain.   Patient is breathing again is improved with patient have a nonproductive cough. BUN/creatinine 12/0.6. Transaminases mildly elevated. Temperatures normal at 97.2 with heart rate 86 and blood pressure currently 141/73. O2 sat 93% on 3 L nasal cannula at rest.    4/11/2021  Patient seen examined on [address]. Patient does not feel as good today - just overall feels weak patient still complains of mouth sores which not improved with Mycostatin. Viscous lidocaine ordered yesterday afternoon. Patient also complains of some increasing lower leg edema along with increased drainage from her right eye. Patient denies any chest or abdominal pain. Temperature 97.2 with heart rate of 79. Blood pressure currently 130/69. O2 sats 95% on 2 L nasal cannula at rest.  BUN/creatinine 15/0.6 with normal liver enzymes and electrolytes. IV fluids will be stopped and short courses IV Lasix. Also changed to Magic mouthwash for mouth sores. O2 sat is improved today. 4/12/2021  Patient seen examined on [address]. Patient is slowly feeling better. The patient denies any chest or abdominal pain. The patient does have shortness of breath with any activity but her O2 saturation is not dropping significantly. Patient has a nonproductive cough. Patient's had very good urine output to IV Lasix. BUN/creatinine 17/0.7 with patient's CBC is completely normal and has mild elevation transaminase still. Temperature is 97.2 with heart rate 81 with O2 sat 93% on 2 L nasal cannula at rest.  Blood pressure 108/71. Oral intake has improved.     Past Medical History:    Past Medical History:   Diagnosis Date    AAA (abdominal aortic aneurysm) ([facility] 2014    Arthritis     Blood circulation, collateral     thoracic outlet syndrome    CAD (coronary artery disease)     mi    COPD (chronic obstructive pulmonary disease) (HCC)     Emphysema with chronic bronchitis (HCC)     GERD (gastroesophageal reflux disease)     Hypothyroidism     MI [facility]myocardial infarction) ([facility]     Substance abuse ([facility]     tobacco    Thoracic outlet syndrome     TIA (transient ischemic attack)     Unspecified cerebral artery occlusion with cerebral infarction      Past Surgical History:    Past Surgical History:   Procedure Laterality Date    APPENDECTOMY      OVARY REMOVAL      also tube on right removed    TONSILLECTOMY AND ADENOIDECTOMY      TUBAL LIGATION Bilateral     UTERINE SUSPENSION         Medications Prior to Admission:    @  Prior to Admission medications    Medication Sig Start Date End Date Taking? Authorizing Provider   Calcium Carbonate Antacid (TUMS ULTRA 1000) 1000 MG CHEW Take 1,000 mg by mouth 3 times daily as needed (Reflux)   Yes Historical Provider, MD   ibuprofen (ADVIL;MOTRIN) 200 MG tablet Take 400 mg by mouth every 6 hours as needed for Pain   Yes Historical Provider, MD   Cholecalciferol (VITAMIN D3) 50 MCG (2000 UT) CAPS Take 2,000 Units by mouth daily   Yes Historical Provider, MD   aspirin 81 MG EC tablet Take 1 tablet by mouth daily 12/5/19  Yes Starla Dobson,        Allergies:  Penicillins, Tetanus toxoids, Ciprofloxacin, Effexor [venlafaxine], Kiwi extract, Betadine [povidone iodine], Codeine, Demerol, Sulfa antibiotics, and Tape Arline Ifeanyi tape]    Social History:   Social History     Socioeconomic History    Marital status:       Spouse name: Not on file    Number of children: Not on file    Years of education: Not on file    Highest education level: Not on file   Occupational History    Not on file   Social Needs    Financial resource strain: Not on file    Food insecurity     Worry: Not on file     Inability: Not on file    Transportation needs     Medical: Not on file     Non-medical: Not on file   Tobacco Use    Smoking status: Current Some Day Smoker     Packs/day: 1.00     Years: 51.00     Pack years: 51.00     Types: Cigarettes    Smokeless tobacco: Never Used    Tobacco comment: IN THE PROCESS OF QUITTING   Substance and Sexual Activity    Alcohol use: Not Currently     Comment: occasional    Drug use: No    Sexual activity: Not on file   Lifestyle    Physical activity     Days per week: Not on file     Minutes per session: Not on file    Stress: Not on file   Relationships    Social connections     Talks on phone: Not on file     Gets together: Not on file     Attends [employer] service: Not on file     Active member of club or organization: Not on file     Attends meetings of clubs or organizations: Not on file     Relationship status: Not on file    Intimate partner violence     Fear of current or ex partner: Not on file     Emotionally abused: Not on file     Physically abused: Not on file     Forced sexual activity: Not on file   Other Topics Concern    Not on file   Social History Narrative    Not on file       Family History:   Family History   Problem Relation Age of Onset    Liver Disease Father     Cancer Father     Heart Disease Father     Cancer Other         breast    Cancer Mother         breast    Heart Disease Mother     Dementia Mother     Diabetes Mother        REVIEW OF SYSTEMS:    Gen: Patient denies any lightheadedness or dizziness. No LOC or syncope. No fevers or chills. HEENT: No earache, sore throat or nasal congestion. Resp: +cough with scant white productive sputum, no hemoptysis   Cardiac: Denies chest pain, SOB, diaphoresis or palpitations. GI: No nausea, vomiting, diarrhea or constipation. No melena or hematochezia. : No urinary complaints, dysuria, hematuria or frequency. MSK: No extremity weakness, paralysis or paresthesias. PHYSICAL EXAM:    Vitals:  /71   Pulse 81   Temp 97.2 °F (36.2 °C) (Infrared)   Resp 18   Ht 5' 6\" (1.676 m)   Wt 148 lb (67.1 kg)   SpO2 93%   BMI 23.89 kg/m²     General:  This is a 73 y.o. yo female who is alert and oriented in NAD  HEENT:  Head is normocephalic and atraumatic, PERRLA, EOMI, mucus membranes moist with no pharyngeal erythema or exudate.   Neck: Supple with no carotid bruits, JVD or thyromegaly.   No cervical adenopathy  CV:  Regular rate and rhythm, no murmurs  Lungs: Coarse decreased breath sounds to auscultation bilaterally with mild scattered right lung crackles with no wheezes, rhonchi  Abdomen:  Soft, nontender, nondistended, bowel sounds present  Extremities:  No edema, peripheral pulses intact bilaterally  Neuro:  Cranial nerves II-XII grossly intact; motor and sensory function intact with no focal deficits  Skin:  No rashes, lesions or wounds    DATA:  CBC with Differential:    Lab Results   Component Value Date    WBC 8.4 04/12/2021    RBC 3.73 04/12/2021    HGB 11.5 04/12/2021    HCT 34.1 04/12/2021     04/12/2021    MCV 91.4 04/12/2021    MCH 30.8 04/12/2021    MCHC 33.7 04/12/2021    RDW 13.2 04/12/2021    LYMPHOPCT 28.5 04/12/2021    MONOPCT 9.9 04/12/2021    MYELOPCT 2.6 04/07/2021    BASOPCT 0.2 04/12/2021    MONOSABS 0.83 04/12/2021    LYMPHSABS 2.40 04/12/2021    EOSABS 0.01 04/12/2021    BASOSABS 0.02 04/12/2021     CMP:    Lab Results   Component Value Date     04/12/2021    K 4.4 04/12/2021    K 3.4 04/02/2021     04/12/2021    CO2 26 04/12/2021    BUN 17 04/12/2021    CREATININE 0.7 04/12/2021    GFRAA >60 04/12/2021    LABGLOM >60 04/12/2021    GLUCOSE 99 04/12/2021    GLUCOSE 83 02/03/2011    PROT 5.9 04/12/2021    LABALBU 2.8 04/12/2021    LABALBU 4.1 02/03/2011    CALCIUM 7.9 04/12/2021    BILITOT 0.2 04/12/2021    ALKPHOS 77 04/12/2021    AST 34 04/12/2021    ALT 34 04/12/2021     Magnesium:    Lab Results   Component Value Date    MG 2.0 04/12/2021     Phosphorus:    Lab Results   Component Value Date    PHOS 4.0 07/02/2017     PT/INR:    Lab Results   Component Value Date    PROTIME 12.1 04/02/2021    INR 1.0 04/02/2021     Troponin:    Lab Results   Component Value Date    TROPONINI <0.01 04/02/2021     U/A:    Lab Results   Component Value Date    COLORU Straw 12/03/2019    PROTEINU Negative 12/03/2019 PHUR 7.0 12/03/2019    WBCUA 0-1 12/03/2019    RBCUA NONE 12/03/2019    BACTERIA NONE 12/03/2019    CLARITYU Clear 12/03/2019    SPECGRAV 1.010 12/03/2019    LEUKOCYTESUR TRACE 12/03/2019    UROBILINOGEN 0.2 12/03/2019    BILIRUBINUR Negative 12/03/2019    BLOODU TRACE 12/03/2019    GLUCOSEU Negative 12/03/2019     ABG:  No results found for: PH, PCO2, PO2, HCO3, BE, THGB, TCO2, O2SAT  HgBA1c:    Lab Results   Component Value Date    LABA1C 5.2 07/02/2017     FLP:    Lab Results   Component Value Date    TRIG 69 04/03/2021    HDL 50 04/03/2021    LDLCALC 58 04/03/2021    LABVLDL 14 04/03/2021     TSH:    Lab Results   Component Value Date    TSH 1.810 04/03/2021     IRON:  No results found for: IRON  LIPASE:    Lab Results   Component Value Date    LIPASE 39 01/03/2020       ASSESSMENT AND PLAN:      Patient Active Problem List    Diagnosis Date Noted    CAP (community acquired pneumonia) 04/10/2015     Priority: High    Syncope 04/10/2015     Priority: High    TIA (transient ischemic attack) 06/01/2013     Priority: High    COPD exacerbation ([facility] 04/02/2021    Closed compression fracture of lumbar vertebra ([facility] 07/01/2017    Thoracic outlet syndrome     COPD (chronic obstructive pulmonary disease) ([facility] 02/02/2011    Anxiety 02/02/2011    CAD (coronary artery disease) 02/02/2011    Tobacco abuse 02/02/2011    Hypothyroidism 02/02/2011    GERD (gastroesophageal reflux disease) 02/02/2011    Dyslipidemia 02/02/2011     Impression:  1. Acute hypoxic respiratory failure  2. Acute exacerbation of COPD with current tobacco use  3. Possible right lower lobe pneumonia  4. Acute COVID-19 respiratory infection  5. Hypothyroidism  6. Hyperlipidemia  7. History of TIA  8. History of coronary disease  9.   Oral candidiasis    Plan:  Admit to telemetry floor  Home medication reviewed  Monitor heart rate, blood pressure, O2 saturation  O2 nasal cannula   Lovenox 40 mg subcu twice daily  DuoNeb aerosols 4 times daily  Sputum and blood cultures-negative  Azithromycin 500 mg daily  Robitussin-DM 10 cc every 4 hours as needed  General diet  Up ad merle.   Tessalon Perles 200 mg p.o. 3 times daily    Chest x-ray PA and lateral -increased opacities lung bases  Blood cultures-negative  Transfer to [facility]  Remdesivir IV piggyback - 5/5 day  Decadron 6 mg p.o. daily  Vitamin C, zinc, vitamin B6, thiamine 1 mg daily  Incentive spirometry every 2 hours while awake  Proning as much as possible  Combivent inhaler 2 puffs 4 times daily  Symbicort inhaler 2 puffs twice daily    Mycostatin suspension 5 cc swish and swallow 4 times daily    Magic mouthwash 5 cc 4 times daily as needed swish and swallow  Repeat Lasix 20 mg IV push x1 today  O2 saturation on room air when at rest and with activity  Tentative discharge home tomorrow      Felicia Smith D.O.  4/12/2021  11:05 AM

## 2021-04-12 NOTE — CARE COORDINATION
CM Note: 2021 at 3:24pm: COVID POSITIVE - test done on . Currently on 2L NC - no home O2. Discussed the possible need for home O2 and reviewed choice list for home O2 providers. States she would like to use [facility] if she had to go home with O2. Asking about the co-pay cost for home O2. Talked to Michelle at [facility] and they will follow along. States with this patient's insurance the co-pay is usually 20% and that would be roughtly $70.00 a month. This information was told to the patient, and she was agreeable to the cost if she would need home O2. Will need ambulatory pulse ox testing. Reviewed PT / OT recommendations for HHC, and patient again declined.  Ruven Apgar RN

## 2021-04-13 VITALS
SYSTOLIC BLOOD PRESSURE: 128 MMHG | BODY MASS INDEX: 23.78 KG/M2 | HEIGHT: 66 IN | RESPIRATION RATE: 20 BRPM | DIASTOLIC BLOOD PRESSURE: 65 MMHG | OXYGEN SATURATION: 95 % | TEMPERATURE: 97.9 F | WEIGHT: 148 LBS | HEART RATE: 103 BPM

## 2021-04-13 PROCEDURE — 2700000000 HC OXYGEN THERAPY PER DAY

## 2021-04-13 PROCEDURE — 97110 THERAPEUTIC EXERCISES: CPT

## 2021-04-13 PROCEDURE — 94640 AIRWAY INHALATION TREATMENT: CPT

## 2021-04-13 PROCEDURE — 6370000000 HC RX 637 (ALT 250 FOR IP): Performed by: INTERNAL MEDICINE

## 2021-04-13 PROCEDURE — 97530 THERAPEUTIC ACTIVITIES: CPT

## 2021-04-13 PROCEDURE — 6360000002 HC RX W HCPCS: Performed by: INTERNAL MEDICINE

## 2021-04-13 RX ORDER — ZINC SULFATE 50(220)MG
50 CAPSULE ORAL DAILY
Qty: 30 CAPSULE | Refills: 3 | COMMUNITY
Start: 2021-04-14

## 2021-04-13 RX ORDER — BENZONATATE 200 MG/1
200 CAPSULE ORAL 3 TIMES DAILY PRN
Qty: 21 CAPSULE | Refills: 0 | Status: SHIPPED | OUTPATIENT
Start: 2021-04-13 | End: 2021-04-20

## 2021-04-13 RX ORDER — PYRIDOXINE HCL (VITAMIN B6) 50 MG
50 TABLET ORAL DAILY
Qty: 30 TABLET | Refills: 0 | COMMUNITY
Start: 2021-04-14

## 2021-04-13 RX ORDER — DEXAMETHASONE 6 MG/1
6 TABLET ORAL DAILY
Qty: 4 TABLET | Refills: 0 | Status: SHIPPED | OUTPATIENT
Start: 2021-04-14 | End: 2021-04-18

## 2021-04-13 RX ADMIN — PYRIDOXINE HCL TAB 50 MG 50 MG: 50 TAB at 12:08

## 2021-04-13 RX ADMIN — OXYCODONE HYDROCHLORIDE AND ACETAMINOPHEN 1000 MG: 500 TABLET ORAL at 10:07

## 2021-04-13 RX ADMIN — IPRATROPIUM BROMIDE AND ALBUTEROL 1 PUFF: 20; 100 SPRAY, METERED RESPIRATORY (INHALATION) at 06:05

## 2021-04-13 RX ADMIN — AZITHROMYCIN MONOHYDRATE 500 MG: 250 TABLET ORAL at 10:06

## 2021-04-13 RX ADMIN — ENOXAPARIN SODIUM 40 MG: 40 INJECTION SUBCUTANEOUS at 10:06

## 2021-04-13 RX ADMIN — NYSTATIN 500000 UNITS: 100000 SUSPENSION ORAL at 14:12

## 2021-04-13 RX ADMIN — NYSTATIN 500000 UNITS: 100000 SUSPENSION ORAL at 10:08

## 2021-04-13 RX ADMIN — BENZONATATE 200 MG: 100 CAPSULE ORAL at 10:07

## 2021-04-13 RX ADMIN — DEXAMETHASONE 6 MG: 2 TABLET ORAL at 10:07

## 2021-04-13 RX ADMIN — THIAMINE HCL TAB 100 MG 100 MG: 100 TAB at 10:07

## 2021-04-13 RX ADMIN — POTASSIUM CHLORIDE 20 MEQ: 20 TABLET, EXTENDED RELEASE ORAL at 10:07

## 2021-04-13 RX ADMIN — BUDESONIDE AND FORMOTEROL FUMARATE DIHYDRATE 2 PUFF: 160; 4.5 AEROSOL RESPIRATORY (INHALATION) at 18:52

## 2021-04-13 RX ADMIN — IPRATROPIUM BROMIDE AND ALBUTEROL 1 PUFF: 20; 100 SPRAY, METERED RESPIRATORY (INHALATION) at 10:45

## 2021-04-13 RX ADMIN — POTASSIUM CHLORIDE 20 MEQ: 20 TABLET, EXTENDED RELEASE ORAL at 12:08

## 2021-04-13 RX ADMIN — BUDESONIDE AND FORMOTEROL FUMARATE DIHYDRATE 2 PUFF: 160; 4.5 AEROSOL RESPIRATORY (INHALATION) at 06:05

## 2021-04-13 RX ADMIN — ASPIRIN 81 MG: 81 TABLET, FILM COATED ORAL at 10:07

## 2021-04-13 RX ADMIN — BENZONATATE 200 MG: 100 CAPSULE ORAL at 14:12

## 2021-04-13 RX ADMIN — IPRATROPIUM BROMIDE AND ALBUTEROL 1 PUFF: 20; 100 SPRAY, METERED RESPIRATORY (INHALATION) at 18:49

## 2021-04-13 RX ADMIN — ZINC SULFATE 220 MG (50 MG) CAPSULE 50 MG: CAPSULE at 12:08

## 2021-04-13 ASSESSMENT — PAIN SCALES - GENERAL: PAINLEVEL_OUTOF10: 0

## 2021-04-13 NOTE — CARE COORDINATION
CM Note: 4/13/2021 at 2:06pm: CM Note: 4/12/2021 at 3:24pm: COVID POSITIVE - test done on 4/7. Currently on 2L NC. Ambulatory pulse ox testing done this morning and patient did qualify for home oxygen. Talked to the patient again and she wanted to use [facility]. Ian ALBERTO ([phone]) and spoke to Venu Contreras and she states they can accept. Informed her that patient is COVID +. O2 script obtained from physician and it was faxed to [facility]. Per Venu Contreras, she states they called her son Yoselin Mckinney and informed him that they will be bring a portable oxygen concentrator here to the hospital between 2pm-6pm today and then they will deliver the oxygen equipment and portable tanks to their home tomorrow. MORAIMA called and talked to son Yoselin Mckinney and he states he is aware of all the above as he taked to Venu Contreras from [facility]. He states he gets off work tonight [facility] today and will be here to pick his mom up between 6-6:30pm. He states he was told by Venu Contreras that [facility] will be out tomorrow between 10am-2pm and  his fiance will be there tomorrow to receive the O2 and  equipment for his mom. Nursing informed. Mrs. Karla Cherry called and informed of all the above and is in agreement.  Monster Rodriguez RN

## 2021-04-13 NOTE — PROGRESS NOTES
Pulse ox was 88% on room air at rest.  Ambulated patient on room air. Oxygen saturation was 84% on room air while ambulating. Oxygen applied. Recovery pulse ox was 94% on 2 liters of oxygen while ambulating.

## 2021-04-13 NOTE — PROGRESS NOTES
Physical Therapy Treatment Note    Room #:  6971/7980-73  Patient Name: Karlos Hines  YOB: 1950  MRN: [id number]    Referring Provider:   Starla Dobson DO      Date of Service: 4/13/2021    Evaluating Physical Therapist: Jazzmine Sutherland, PT #[id number]      Diagnosis:   COPD exacerbation ([facility] [J44.1]       Patient Active Problem List   Diagnosis    [facility]    Anxiety    CAD (coronary artery disease)    Tobacco abuse    Hypothyroidism    GERD (gastroesophageal reflux disease)    Dyslipidemia    TIA (transient ischemic attack)    Thoracic outlet syndrome    CAP (community acquired pneumonia)    Syncope    Closed compression fracture of lumbar vertebra ([facility]    COPD exacerbation ([facility]       Tentative placement recommendation:  Home Health Physical Therapy if goals met   Equipment recommendation: To be determined      Prior Level of Function: Patient ambulated independently , occassionally with a cane when foot hurts  Rehab Potential: good  for baseline    Past medical history:   Past Medical History:   Diagnosis Date    [facility] 2014    Arthritis     Blood circulation, collateral     thoracic outlet syndrome    CAD (coronary artery disease)     mi    COPD (chronic obstructive pulmonary disease) ([facility]     Emphysema with chronic bronchitis (HCC)     GERD (gastroesophageal reflux disease)     Hypothyroidism     MI (myocardial infarction) ([facility]     Substance abuse ([facility]     tobacco    Thoracic outlet syndrome     TIA (transient ischemic attack)     Unspecified cerebral artery occlusion with cerebral infarction      Past Surgical History:   Procedure Laterality Date    APPENDECTOMY      OVARY REMOVAL      also tube on right removed    TONSILLECTOMY AND ADENOIDECTOMY      TUBAL LIGATION Bilateral     UTERINE SUSPENSION         Precautions:  Activity as tolerated, falls, alarm and O2 , 2L, covid positive her lung capacity, pacing, and energy conservation. pt amb in room , seated rests, pt rested in restroom, stood at window, sat at side of bed   Therapeutic Exercises:  ankle pumps and long arc quad  x  10-15reps. Pulse ox monitored throughout with rests provided and cues for technique and breathing. Pt stood at the window,  Pulse ox monitored throughout all activity     At end of session, patient in bed side lying  with  call light and phone within reach,  all lines and tubes intact, nursing notified. Patient would benefit from continued skilled Physical Therapy to improve functional independence and quality of life. Patient's/ family goals   home      ASSESSMENT: Patient exhibits decreased strength, balance and endurance impairing gait distance and tolerance to activity . Closely monitoring  SPO2 levels. Pt with increased activity with decreased pulse ox with minimal activity. Standing and seated rests provide, pt continues pleasant and motivated to participate and progress   Plan of Care:     -Standing Balance: Perform strengthening exercises in standing to promote motor control with or without upper extremity support   -Gait: Gait training and Standing activities to improve: base of support, weight shift, weight bearing    -Endurance: Utilize Supervised activities to increase level of endurance to allow for safe functional mobility including transfers and gait     Patient and or family understand(s) diagnosis, prognosis, and plan of care. Frequency of treatments: Patient will be seen  daily.        Time in  1016  Time out  1058    Total Treatment Time 42 minutes      CPT codes:  Therapeutic activities (75881)   25 minutes  2 unit(s)  Therapeutic exercises (91250)   17 minutes  1 unit(s)

## 2021-04-13 NOTE — DISCHARGE SUMMARY
pneumonia the patient possibly has a has a viral bronchitis/pneumonitis versus bacterial bronchitis. 4/4/2021  Patient seen examined on telemetry floor. Patient denies any chest or abdominal pain. Patient still complains of increased shortness of breath with activity and only about 25% improved from admission. Patient O2 saturation with activity yesterday on room air was 87%. Temperature was 97.8 with a heart rate of 100 and sinus rhythm. Blood pressure 120/70 with O2 sat today on 2 L nasal cannula was 91-96% at rest.  Patient lung sounds are improved today. 4/5/2021  Patient seen examined on telemetry floor. Patient still feels short of breath with any activity. Patient denies any chest or abdominal pain. There is no nausea or vomiting. Patient still with moist nonproductive cough. BUN/creatinine 13/0.6 with potassium increased to 5.3 today. CBC is normal.  Temperature 97.9 with heart rate 72 with blood pressure currently 141/75. O2 sat 95% on 2 L nasal cannula. Patient with persistent coarse breath sounds with mild scattered occasional rhonchi. 4/6/2021  Patient seen examined on telemetry floor. Case discussed with patient's nurse at the bedside today. Patient's heart rate dramatically increased today with activity and with patient having the oxygen off for short period time. Patient's O2 sat did drop into the 80s on room air. Patient's heart rate improved with putting oxygen back on. Currently the patient denies any problem chest pain, abdominal pain, nausea/vomiting or unusual shortness of breath at rest.  Patient little bit more symptomatic with dyspnea with exertion today. Patient lung sounds have decreased coarse breath sounds without wheezing or rhonchi today. BUN/creatinine was 19/0.7. Temperature 98.3 with heart rate 83. Patient thought to be in atrial fibrillation by nursing today. Blood pressure 123/73 with O2 sat 97% on 4 L nasal cannula.     4/7/2021  Patient seen examined on telemetry floor. Patient had a bad morning because of complaints of fever/chills, increased cough, dizziness and weakness. Patient had temp of 101.5 early this morning. Blood pressure went as low as 80/58 but currently 105/69. O2 sat 95% on 2 L currently but was as low as 90% on 4 L nasal cannula earlier this morning. Patient lung sounds have increased congestion with expiratory wheezing with scattered crackles. Patient will have repeat chest x-ray PA and lateral along with stat blood cultures along with CBC and BMP now. 4/8/2021  Patient seen examined on [facility]. Patient feels fairly good today. Patient currently on 4-5 L nasal cannula and is currently proning. Patient denies any chest or abdominal pain. Patient does have shortness of breath with activity. Patient COVID-19 antigen result was positive from yesterday early afternoon. Temperature 97.3 with heart rate of 76. Blood pressure 115/64. O2 sat 90% on 4 L nasal cannula at rest.  Patient switched over to Combivent and Symbicort inhaler along with consulting pharmacist for remdesivir and starting p.o. Decadron. Patient started on oral vitamin C, zinc, B6 and thiamine. 7 serology every 2 hours while awake. 4/9/2021  Patient seen examined on [facility]. Patient states she is feeling better today. Patient has been turned on 3 L nasal cannula from 4 L. Patient also complains of soreness in her mouth with examination showing some white plaques and mild erythema. Patient denies any chest or abdominal pain. There is no nausea vomiting. BUN/creatinine 20/0.6. D-dimer 2200 with a fibrinogen 468. Temperature 97.3 with heart rate of 95. Temperature 97.3 with heart rate 95 with blood pressure currently 124/88. O2 sat 97% on 3 L nasal cannula. Start statin suspension. 4/10/2021  Patient seen examined on [facility]. Patient states she feels better today. Patient denies any chest pain or abdominal pain.   Patient is breathing again is improved with patient have a nonproductive cough. BUN/creatinine 12/0.6. Transaminases mildly elevated. Temperatures normal at 97.2 with heart rate 86 and blood pressure currently 141/73. O2 sat 93% on 3 L nasal cannula at rest.    4/11/2021  Patient seen examined on [facility]. Patient does not feel as good today - just overall feels weak patient still complains of mouth sores which not improved with Mycostatin. Viscous lidocaine ordered yesterday afternoon. Patient also complains of some increasing lower leg edema along with increased drainage from her right eye. Patient denies any chest or abdominal pain. Temperature 97.2 with heart rate of 79. Blood pressure currently 130/69. O2 sats 95% on 2 L nasal cannula at rest.  BUN/creatinine 15/0.6 with normal liver enzymes and electrolytes. IV fluids will be stopped and short courses IV Lasix. Also changed to Magic mouthwash for mouth sores. O2 sat is improved today. 4/12/2021  Patient seen examined on [facility]. Patient is slowly feeling better. The patient denies any chest or abdominal pain. The patient does have shortness of breath with any activity but her O2 saturation is not dropping significantly. Patient has a nonproductive cough. Patient's had very good urine output to IV Lasix. BUN/creatinine 17/0.7 with patient's CBC is completely normal and has mild elevation transaminase still. Temperature is 97.2 with heart rate 81 with O2 sat 93% on 2 L nasal cannula at rest.  Blood pressure 108/71. Oral intake has improved. 4/13/2021  Patient seen examined on [facility]. Patient feels better. She wants to go home. Temperature 98.2 with heart rate eighty-six. Blood pressure 113/81. O2 sat 94% on 2 L nasal cannula. Urine output is very good.     Pt is stable for discharge    Past Medical History:    Past Medical History:   Diagnosis Date    AAA (abdominal aortic aneurysm) ([facility] [address] 2014    Arthritis     Blood circulation, collateral     thoracic outlet syndrome    CAD (coronary artery disease)     mi    COPD (chronic obstructive pulmonary disease) (HCC)     Emphysema with chronic bronchitis (HCC)     GERD (gastroesophageal reflux disease)     Hypothyroidism     MI (myocardial infarction) ([facility] [address]     Sub[facility]     tobacco    Thoracic outlet syndrome     TIA (transient ischemic attack)     Unspecified cerebral artery occlusion with cerebral infarction      Past Surgical History:    Past Surgical History:   Procedure Laterality Date    APPENDECTOMY      OVARY REMOVAL      also tube on right removed    TONSILLECTOMY AND ADENOIDECTOMY      TUBAL LIGATION Bilateral     UTERINE SUSPENSION         Medications Prior to Admission:    @  Prior to Admission medications    Medication Sig Start Date End Date Taking? Authorizing Provider   Calcium Carbonate Antacid (TUMS ULTRA 1000) 1000 MG CHEW Take 1,000 mg by mouth 3 times daily as needed (Reflux)   Yes Historical Provider, MD   ibuprofen (ADVIL;MOTRIN) 200 MG tablet Take 400 mg by mouth every 6 hours as needed for Pain   Yes Historical Provider, MD   Cholecalciferol (VITAMIN D3) 50 MCG (2000 UT) CAPS Take 2,000 Units by mouth daily   Yes Historical Provider, MD   aspirin 81 MG EC tablet Take 1 tablet by mouth daily 12/5/19  Yes Ricci Grider, DO       Allergies:  Penicillins, Tetanus toxoids, Ciprofloxacin, Effexor [venlafaxine], Kiwi extract, Betadine [povidone iodine], Codeine, Demerol, Sulfa antibiotics, and Tape Dorothyann Paron tape]    Social History:   Social History     Socioeconomic History    Marital status:       Spouse name: Not on file    Number of children: Not on file    Years of education: Not on file    Highest education level: Not on file   Occupational History    Not on file   Social Needs    Financial resource strain: Not on file    Food insecurity     Worry: Not on file     Inability: Not on file    Transportation needs     Medical: Not on file     Non-medical: Not on file   Tobacco Use Value Date    PROTIME 12.1 04/02/2021    INR 1.0 04/02/2021     Troponin:    Lab Results   Component Value Date    TROPONINI <0.01 04/02/2021     U/A:    Lab Results   Component Value Date    COLORU Straw 12/03/2019    PROTEINU Negative 12/03/2019    PHUR 7.0 12/03/2019    WBCUA 0-1 12/03/2019    RBCUA NONE 12/03/2019    BACTERIA NONE 12/03/2019    CLARITYU Clear 12/03/2019    SPECGRAV 1.010 12/03/2019    LEUKOCYTESUR TRACE 12/03/2019    UROBILINOGEN 0.2 12/03/2019    BILIRUBINUR Negative 12/03/2019    BLOODU TRACE 12/03/2019    GLUCOSEU Negative 12/03/2019     ABG:  No results found for: PH, PCO2, PO2, HCO3, BE, THGB, TCO2, O2SAT  HgBA1c:    Lab Results   Component Value Date    LABA1C 5.2 07/02/2017     FLP:    Lab Results   Component Value Date    TRIG 69 04/03/2021    HDL 50 04/03/2021    LDLCALC 58 04/03/2021    LABVLDL 14 04/03/2021     TSH:    Lab Results   Component Value Date    TSH 1.810 04/03/2021     IRON:  No results found for: IRON  LIPASE:    Lab Results   Component Value Date    LIPASE 39 01/03/2020       ASSESSMENT AND PLAN:      Patient Active Problem List    Diagnosis Date Noted    CAP (community acquired pneumonia) 04/10/2015     Priority: High    Syncope 04/10/2015     Priority: High    TIA (transient ischemic attack) 06/01/2013     Priority: High    COPD exacerbation ([address] 04/02/2021    Closed compression fracture of lumbar vertebra ([facility] [facility] 07/01/2017    Thoracic outlet syndrome     COPD (chronic obstructive pulmonary disease) ([facility] [address] 02/02/2011    Anxiety 02/02/2011    CAD (coronary artery disease) 02/02/2011    Tobacco abuse 02/02/2011    Hypothyroidism 02/02/2011    GERD (gastroesophageal reflux disease) 02/02/2011    Dyslipidemia 02/02/2011     Impression:  1. Acute hypoxic respiratory failure  2. Acute exacerbation of COPD with current tobacco use  3. Right lower lobe pneumonia  4. Acute COVID-19 respiratory infection  5. Hypothyroidism  6. Hyperlipidemia  7.   History of TIA  8. History of coronary disease  9.   Oral candidiasis    Plan:  Discharge home   Rx O2 NC 2-3 L NC  Rx Tessalon Perles 200 mg p.o. 3 times daily  Rx Decadron 6 mg p.o. daily X 4 days  Vitamin C, zinc, vitamin B6, thiamine 1 mg daily  Rx Combivent inhaler 2 puffs 4 times daily  Rx Symbicort inhaler 2 puffs twice daily  Rx Magic mouthwash 5 cc 4 times daily as needed swish and swallow    Same home meds    F/U with PCP in 1 week or as directed        Antoni Wagner D.O.  4/13/2021  12:31 PM

## 2021-04-14 ENCOUNTER — CARE COORDINATION (OUTPATIENT)
Dept: CASE MANAGEMENT | Age: 71
End: 2021-04-14

## 2021-04-14 NOTE — CARE COORDINATION
[facility] Initial Follow Up Call    Call within 2 business days of discharge: Yes    Patient: Philip Justin Patient : 1950   MRN: [id number]  Reason for Admission: COPD exacerbation/COVID-19+  Discharge Date: 21 RARS: Readmission Risk Score: 11      Last Discharge [facility]       Complaint Diagnosis Description Type Department Provider    21 Shortness of Breath; Cough Community acquired pneumonia of right lower lobe of lung . .. ED to Hosp-Admission (Discharged) (ADMITTED) [id number]; [facility]. .. Challenges to be reviewed by the provider   Additional needs identified to be addressed with provider No  none             Method of communication with provider : none    Discussed COVID-19 related testing which was available at this time. Test results were positive. Patient informed of results, if available? Yes    Advance Care Planning:   Does patient have an Advance Directive:  decision maker updated. Was this a readmission? No  Patient stated reason for admission: shortness of breath  Patients top risk factors for readmission: medical condition and polypharmacy    Care Transition Nurse (CTN) contacted the patient by telephone to perform post hospital discharge assessment. Verified name and  with patient as identifiers. Provided introduction to self, and explanation of the CTN role. CTN reviewed discharge instructions, medical action plan and red flags with patient who verbalized understanding. Patient given an opportunity to ask questions and does not have any further questions or concerns at this time. Were discharge instructions available to patient? Yes. Reviewed appropriate site of care based on symptoms and resources available to patient including: PCP, Urgent care clinics, When to call 911 and Condition related references. The patient agrees to contact the PCP office for questions related to their healthcare.      Medication reconciliation was performed with patient, who verbalizes understanding of administration of home medications. Advised obtaining a 90-day supply of all daily and as-needed medications. Covid Risk Education    Patient has following risk factors of: COPD and pneumonia. Education provided regarding infection prevention, and signs and symptoms of COVID-19 and when to seek medical attention with patient who verbalized understanding. Discussed exposure protocols and quarantine From CDC: Are you at higher risk for severe illness?   and given an opportunity for questions and concerns. The patient agrees to contact the COVID-19 hotline [phone] or PCP office for questions related to COVID-19. For more information on steps you can take to protect yourself, see CDC's How to Protect Yourself     Was patient discharged with a pulse oximeter? Yes Discussed and confirmed pulse oximeter discharge instructions and when to notify provider or seek emergency care. Patient/family/caregiver given information for [employer] and agrees to enroll no  Patient's preferred e-mail: declines  Patient's preferred phone number: declines    Discussed follow-up appointments. If no appointment was previously scheduled, appointment scheduling offered: Yes. Is follow up appointment scheduled within 7 days of discharge? No  Non-[facility] follow up appointment(s): CTN confirmed with patient she will call and schedule follow up with Dr. Juan C Luong (PCP) and declines needing CTN assistance. Plan for follow-up call in 7-10 days based on severity of symptoms and risk factors. Plan for next call: symptom management-Has fatigue/SOB/cough improved? and follow up appointment-Did patient get scheudled for PCP f/u? Non-face-to-face services provided:  Scheduled appointment with PCP-CTN confirmed with patient she will call and schedule follow up with Dr. Yasmeen Lainez (PCP) and declines needing CTN assistance.    Obtained and reviewed discharge

## 2021-04-23 ENCOUNTER — CARE COORDINATION (OUTPATIENT)
Dept: CASE MANAGEMENT | Age: 71
End: 2021-04-23

## 2021-12-27 ENCOUNTER — APPOINTMENT (OUTPATIENT)
Dept: CT IMAGING | Age: 71
End: 2021-12-27
Payer: MEDICARE

## 2021-12-27 ENCOUNTER — HOSPITAL ENCOUNTER (EMERGENCY)
Age: 71
Discharge: HOME | End: 2021-12-27
Attending: STUDENT IN AN ORGANIZED HEALTH CARE EDUCATION/TRAINING PROGRAM
Payer: MEDICARE

## 2021-12-27 VITALS
WEIGHT: 162 LBS | HEIGHT: 66 IN | TEMPERATURE: 98 F | DIASTOLIC BLOOD PRESSURE: 112 MMHG | HEART RATE: 100 BPM | RESPIRATION RATE: 22 BRPM | OXYGEN SATURATION: 94 % | SYSTOLIC BLOOD PRESSURE: 160 MMHG | BODY MASS INDEX: 26.03 KG/M2

## 2021-12-27 DIAGNOSIS — S32.000A COMPRESSION FRACTURE OF LUMBAR VERTEBRA, UNSPECIFIED LUMBAR VERTEBRAL LEVEL, INITIAL ENCOUNTER (HCC): ICD-10-CM

## 2021-12-27 DIAGNOSIS — S22.000A COMPRESSION FRACTURE OF THORACIC VERTEBRA, UNSPECIFIED THORACIC VERTEBRAL LEVEL, INITIAL ENCOUNTER (HCC): Primary | ICD-10-CM

## 2021-12-27 LAB
ALBUMIN SERPL-MCNC: 4.1 G/DL (ref 3.5–5.2)
ALP BLD-CCNC: 113 U/L (ref 35–104)
ALT SERPL-CCNC: 8 U/L (ref 0–32)
ANION GAP SERPL CALCULATED.3IONS-SCNC: 12 MMOL/L (ref 7–16)
AST SERPL-CCNC: 18 U/L (ref 0–31)
BASOPHILS ABSOLUTE: 0.03 E9/L (ref 0–0.2)
BASOPHILS RELATIVE PERCENT: 0.6 % (ref 0–2)
BILIRUB SERPL-MCNC: 0.2 MG/DL (ref 0–1.2)
BUN BLDV-MCNC: 13 MG/DL (ref 6–23)
CALCIUM SERPL-MCNC: 9 MG/DL (ref 8.6–10.2)
CHLORIDE BLD-SCNC: 104 MMOL/L (ref 98–107)
CO2: 23 MMOL/L (ref 22–29)
CREAT SERPL-MCNC: 0.8 MG/DL (ref 0.5–1)
EKG ATRIAL RATE: 84 BPM
EKG P AXIS: 59 DEGREES
EKG P-R INTERVAL: 160 MS
EKG Q-T INTERVAL: 380 MS
EKG QRS DURATION: 88 MS
EKG QTC CALCULATION (BAZETT): 449 MS
EKG R AXIS: -25 DEGREES
EKG T AXIS: 35 DEGREES
EKG VENTRICULAR RATE: 84 BPM
EOSINOPHILS ABSOLUTE: 0.13 E9/L (ref 0.05–0.5)
EOSINOPHILS RELATIVE PERCENT: 2.8 % (ref 0–6)
GFR AFRICAN AMERICAN: >60
GFR NON-AFRICAN AMERICAN: >60 ML/MIN/1.73
GLUCOSE BLD-MCNC: 86 MG/DL (ref 74–99)
HCT VFR BLD CALC: 34.9 % (ref 34–48)
HEMOGLOBIN: 11.3 G/DL (ref 11.5–15.5)
IMMATURE GRANULOCYTES #: 0.02 E9/L
IMMATURE GRANULOCYTES %: 0.4 % (ref 0–5)
LYMPHOCYTES ABSOLUTE: 1.21 E9/L (ref 1.5–4)
LYMPHOCYTES RELATIVE PERCENT: 25.8 % (ref 20–42)
MCH RBC QN AUTO: 30.7 PG (ref 26–35)
MCHC RBC AUTO-ENTMCNC: 32.4 % (ref 32–34.5)
MCV RBC AUTO: 94.8 FL (ref 80–99.9)
MONOCYTES ABSOLUTE: 0.53 E9/L (ref 0.1–0.95)
MONOCYTES RELATIVE PERCENT: 11.3 % (ref 2–12)
NEUTROPHILS ABSOLUTE: 2.77 E9/L (ref 1.8–7.3)
NEUTROPHILS RELATIVE PERCENT: 59.1 % (ref 43–80)
PDW BLD-RTO: 12.3 FL (ref 11.5–15)
PLATELET # BLD: 303 E9/L (ref 130–450)
PMV BLD AUTO: 9.6 FL (ref 7–12)
POTASSIUM REFLEX MAGNESIUM: 3.9 MMOL/L (ref 3.5–5)
RBC # BLD: 3.68 E12/L (ref 3.5–5.5)
SODIUM BLD-SCNC: 139 MMOL/L (ref 132–146)
TOTAL PROTEIN: 7.3 G/DL (ref 6.4–8.3)
TROPONIN, HIGH SENSITIVITY: 8 NG/L (ref 0–9)
WBC # BLD: 4.7 E9/L (ref 4.5–11.5)

## 2021-12-27 PROCEDURE — 6360000002 HC RX W HCPCS: Performed by: STUDENT IN AN ORGANIZED HEALTH CARE EDUCATION/TRAINING PROGRAM

## 2021-12-27 PROCEDURE — 72128 CT CHEST SPINE W/O DYE: CPT

## 2021-12-27 PROCEDURE — 6370000000 HC RX 637 (ALT 250 FOR IP): Performed by: STUDENT IN AN ORGANIZED HEALTH CARE EDUCATION/TRAINING PROGRAM

## 2021-12-27 PROCEDURE — 85025 COMPLETE CBC W/AUTO DIFF WBC: CPT

## 2021-12-27 PROCEDURE — 93005 ELECTROCARDIOGRAM TRACING: CPT | Performed by: STUDENT IN AN ORGANIZED HEALTH CARE EDUCATION/TRAINING PROGRAM

## 2021-12-27 PROCEDURE — 96374 THER/PROPH/DIAG INJ IV PUSH: CPT

## 2021-12-27 PROCEDURE — 84484 ASSAY OF TROPONIN QUANT: CPT

## 2021-12-27 PROCEDURE — 71275 CT ANGIOGRAPHY CHEST: CPT

## 2021-12-27 PROCEDURE — 99285 EMERGENCY DEPT VISIT HI MDM: CPT

## 2021-12-27 PROCEDURE — 96376 TX/PRO/DX INJ SAME DRUG ADON: CPT

## 2021-12-27 PROCEDURE — 80053 COMPREHEN METABOLIC PANEL: CPT

## 2021-12-27 PROCEDURE — 6360000004 HC RX CONTRAST MEDICATION: Performed by: RADIOLOGY

## 2021-12-27 PROCEDURE — 72131 CT LUMBAR SPINE W/O DYE: CPT

## 2021-12-27 RX ORDER — OXYCODONE HYDROCHLORIDE AND ACETAMINOPHEN 5; 325 MG/1; MG/1
1 TABLET ORAL ONCE
Status: COMPLETED | OUTPATIENT
Start: 2021-12-27 | End: 2021-12-27

## 2021-12-27 RX ORDER — OXYCODONE HYDROCHLORIDE AND ACETAMINOPHEN 5; 325 MG/1; MG/1
1 TABLET ORAL EVERY 8 HOURS PRN
Qty: 12 TABLET | Refills: 0 | Status: SHIPPED | OUTPATIENT
Start: 2021-12-27 | End: 2021-12-31

## 2021-12-27 RX ORDER — FENTANYL CITRATE 50 UG/ML
INJECTION, SOLUTION INTRAMUSCULAR; INTRAVENOUS
Status: DISCONTINUED
Start: 2021-12-27 | End: 2021-12-27 | Stop reason: HOSPADM

## 2021-12-27 RX ORDER — FENTANYL CITRATE 50 UG/ML
50 INJECTION, SOLUTION INTRAMUSCULAR; INTRAVENOUS ONCE
Status: COMPLETED | OUTPATIENT
Start: 2021-12-27 | End: 2021-12-27

## 2021-12-27 RX ADMIN — IOPAMIDOL 75 ML: 755 INJECTION, SOLUTION INTRAVENOUS at 14:22

## 2021-12-27 RX ADMIN — FENTANYL CITRATE 50 MCG: 50 INJECTION, SOLUTION INTRAMUSCULAR; INTRAVENOUS at 13:33

## 2021-12-27 RX ADMIN — OXYCODONE AND ACETAMINOPHEN 1 TABLET: 5; 325 TABLET ORAL at 17:08

## 2021-12-27 RX ADMIN — FENTANYL CITRATE 50 MCG: 50 INJECTION, SOLUTION INTRAMUSCULAR; INTRAVENOUS at 15:21

## 2021-12-27 ASSESSMENT — ENCOUNTER SYMPTOMS
COUGH: 0
ABDOMINAL PAIN: 0
NAUSEA: 0
COLOR CHANGE: 0
BACK PAIN: 1
VOMITING: 0
SHORTNESS OF BREATH: 0
DIARRHEA: 0

## 2021-12-27 ASSESSMENT — PAIN SCALES - GENERAL
PAINLEVEL_OUTOF10: 10
PAINLEVEL_OUTOF10: 9

## 2021-12-27 ASSESSMENT — PAIN DESCRIPTION - ORIENTATION: ORIENTATION: RIGHT

## 2021-12-27 ASSESSMENT — PAIN DESCRIPTION - PAIN TYPE: TYPE: ACUTE PAIN

## 2021-12-27 ASSESSMENT — PAIN DESCRIPTION - PROGRESSION: CLINICAL_PROGRESSION: NOT CHANGED

## 2021-12-27 ASSESSMENT — PAIN DESCRIPTION - LOCATION: LOCATION: BACK;KNEE;RIB CAGE

## 2021-12-27 NOTE — ED PROVIDER NOTES
HPI   61-year-old female patient presents to emergency department for chief complaint of back pain and bilateral rib pain. Patient had a fall 3 weeks prior patient states that she has been at home try deal with the pain by taking ibuprofen which has provided some relief. Patient reports that she tried to step over gait however tripped and fell forward. She is complaining of mid back pain as well as bilateral rib pain, worse with inspiration, no anterior chest pain. She denies any history of blood clots however she does have history of AAA diagnosed in 2019 she has it was around 3 cm. She is not complaining of any abdominal pain, tearing sensation, patient not hypotensive. Symptoms moderate in severity, persistent. She has history of emphysema on 2 to 3 L of oxygen at baseline. She has not been having to increase her oxygen. Review of Systems   Constitutional: Negative for chills, fatigue and fever. HENT: Negative for congestion. Respiratory: Negative for cough and shortness of breath. Cardiovascular: Negative for chest pain. Gastrointestinal: Negative for abdominal pain, diarrhea, nausea and vomiting. Genitourinary: Negative for difficulty urinating, dysuria and hematuria. Musculoskeletal: Positive for back pain. Bilateral rib pain   Skin: Negative for color change. Neurological: Negative for dizziness and numbness. All other systems reviewed and are negative. Physical Exam  Vitals and nursing note reviewed. Constitutional:       Appearance: Normal appearance. HENT:      Head: Normocephalic and atraumatic. Nose: Nose normal. No congestion. Mouth/Throat:      Mouth: Mucous membranes are moist.      Pharynx: Oropharynx is clear. Eyes:      Conjunctiva/sclera: Conjunctivae normal.      Pupils: Pupils are equal, round, and reactive to light. Cardiovascular:      Rate and Rhythm: Normal rate and regular rhythm. Pulses: Normal pulses.       Heart sounds: Normal heart sounds. Pulmonary:      Effort: Pulmonary effort is normal. No respiratory distress. Breath sounds: Normal breath sounds. Comments: Bilateral rib tenderness to palpation. No overlying ecchymosis, no flail chest  Abdominal:      General: Bowel sounds are normal. There is no distension. Tenderness: There is no abdominal tenderness. Musculoskeletal:         General: Normal range of motion. Cervical back: Normal range of motion and neck supple. Comments: Thoracic spine tenderness to palpation no step-offs or crepitus no overlying ecchymosis   Skin:     General: Skin is warm and dry. Capillary Refill: Capillary refill takes less than 2 seconds. Neurological:      General: No focal deficit present. Mental Status: She is alert. Procedures     MDM   53-year-old female patient presented to emergency department complaint of back pain. Lab work and imaging performed. Patient without any acute EKG changes, no ST elevations and normal troponin. Found to have multiple thoracic and lumbar compression fractures. no signs or symptoms of cauda equina at this time. Patient not hypotensive at this time and no abdominal pain, unlikely to have complication of her AAA at this time. Pain control here with Percocet, patient is able to ambulate. at this time patient is to be discharged and follow-up as an outpatient. EKG: This EKG is signed and interpreted by me. Rate: 84  Rhythm: sinus  Interpretation: no acute changes, no ST elevations  Comparison: No changes compared to prior       ED Course as of 12/27/21 1818   Mon Dec 27, 2021   1642 Patient still having pain we will try Percocet. Patient with multiple compression fractures of her thoracic vertebrae [FG]   1740 Patient ambulating to the restroom. [FG]   1808 Pain improved.  [FG]      ED Course User Index  [FG] Malka Chamorro DO       --------------------------------------------- PAST HISTORY ---------------------------------------------  Past Medical History:  has a past medical history of AAA (abdominal aortic aneurysm) ([facility] Arthritis, Blood circulation, collateral, CAD (coronary artery disease), COPD (chronic obstructive pulmonary disease) ([facility] Emphysema with chronic bronchitis ([facility] GERD (gastroesophageal reflux disease), Hypothyroidism, MI (myocardial infarction) ([facility] Substance abuse ([place] [id number].), Thoracic outlet syndrome, TIA (transient ischemic attack), and Unspecified cerebral artery occlusion with cerebral infarction. Past Surgical History:  has a past surgical history that includes Tonsillectomy and adenoidectomy; Uterine Suspension; Appendectomy; Ovary removal; and Tubal ligation (Bilateral). Social History:  reports that she has been smoking cigarettes. She has a 51.00 pack-year smoking history. She has never used smokeless tobacco. She reports previous alcohol use. She reports that she does not use drugs. Family History: family history includes Cancer in her father, mother, and another family member; Dementia in her mother; Diabetes in her mother; Heart Disease in her father and mother; Liver Disease in her father. The patients home medications have been reviewed.     Allergies: Penicillins, Tetanus toxoids, Ciprofloxacin, Effexor [venlafaxine], Kiwi extract, Betadine [povidone iodine], Codeine, Demerol, Sulfa antibiotics, and Tape [adhesive tape]    -------------------------------------------------- RESULTS -------------------------------------------------  Labs:  Results for orders placed or performed during the hospital encounter of 12/27/21   CBC Auto Differential   Result Value Ref Range    WBC 4.7 4.5 - 11.5 E9/L    RBC 3.68 3.50 - 5.50 E12/L    Hemoglobin 11.3 (L) 11.5 - 15.5 g/dL    Hematocrit 34.9 34.0 - 48.0 %    MCV 94.8 80.0 - 99.9 fL    MCH 30.7 26.0 - 35.0 pg    MCHC 32.4 32.0 - 34.5 %    RDW 12.3 11.5 - 15.0 fL    Platelets 961 305 - 946 E9/L    MPV 9.6 7.0 - 12.0 fL    Neutrophils % 59.1 43.0 - 80.0 %    Immature Granulocytes % 0.4 0.0 - 5.0 %    Lymphocytes % 25.8 20.0 - 42.0 %    Monocytes % 11.3 2.0 - 12.0 %    Eosinophils % 2.8 0.0 - 6.0 %    Basophils % 0.6 0.0 - 2.0 %    Neutrophils Absolute 2.77 1.80 - 7.30 E9/L    Immature Granulocytes # 0.02 E9/L    Lymphocytes Absolute 1.21 (L) 1.50 - 4.00 E9/L    Monocytes Absolute 0.53 0.10 - 0.95 E9/L    Eosinophils Absolute 0.13 0.05 - 0.50 E9/L    Basophils Absolute 0.03 0.00 - 0.20 E9/L   Comprehensive Metabolic Panel w/ Reflex to MG   Result Value Ref Range    Sodium 139 132 - 146 mmol/L    Potassium reflex Magnesium 3.9 3.5 - 5.0 mmol/L    Chloride 104 98 - 107 mmol/L    CO2 23 22 - 29 mmol/L    Anion Gap 12 7 - 16 mmol/L    Glucose 86 74 - 99 mg/dL    BUN 13 6 - 23 mg/dL    CREATININE 0.8 0.5 - 1.0 mg/dL    GFR Non-African American >60 >=60 mL/min/1.73    GFR African American >60     Calcium 9.0 8.6 - 10.2 mg/dL    Total Protein 7.3 6.4 - 8.3 g/dL    Albumin 4.1 3.5 - 5.2 g/dL    Total Bilirubin 0.2 0.0 - 1.2 mg/dL    Alkaline Phosphatase 113 (H) 35 - 104 U/L    ALT 8 0 - 32 U/L    AST 18 0 - 31 U/L   Troponin   Result Value Ref Range    Troponin, High Sensitivity 8 0 - 9 ng/L   EKG 12 Lead   Result Value Ref Range    Ventricular Rate 84 BPM    Atrial Rate 84 BPM    P-R Interval 160 ms    QRS Duration 88 ms    Q-T Interval 380 ms    QTc Calculation (Bazett) 449 ms    P Axis 59 degrees    R Axis -25 degrees    T Axis 35 degrees       Radiology:  CT THORACIC SPINE WO CONTRAST   Final Result   Osteoporotic compression fractures of T7 and T11, and L1 new since exam on   January 3, 2020 and otherwise age indeterminate. There is minimal multilevel   2-3 mm bony retrolisthesis without significant bony spinal canal stenosis.       Chronic unchanged compression fracture of L2.      RECOMMENDATIONS:   Unavailable         CT LUMBAR SPINE WO CONTRAST   Final Result   Osteoporotic compression fractures of T7 and T11, and L1 new since exam on   January 3, 2020 and otherwise age indeterminate. There is minimal multilevel   2-3 mm bony retrolisthesis without significant bony spinal canal stenosis. Chronic unchanged compression fracture of L2.      RECOMMENDATIONS:   Unavailable         CTA PULMONARY W CONTRAST   Final Result   No evidence of a pulmonary embolism. Severe emphysematous changes with bibasilar ground-glass and consolidative   airspace opacities suggesting atelectasis and/or pulmonary parenchymal   scarring. Multiple age-indeterminate (but new since April 2021) compression fracture   deformities of the thoracic spine including T7, T11 and L1. Recommend   clinical correlation and correlation with thoracic spine MRI or nuclear   medicine bone scan if determining the age of the fractures is clinically   relevant.             ------------------------- NURSING NOTES AND VITALS REVIEWED ---------------------------  Date / Time Roomed:  12/27/2021 12:54 PM  ED Bed Assignment:  19/19    The nursing notes within the ED encounter and vital signs as below have been reviewed. BP (!) 160/112   Pulse 100   Temp 98 °F (36.7 °C) (Oral)   Resp 22   Ht 5' 6\" (1.676 m)   Wt 162 lb (73.5 kg)   SpO2 94%   BMI 26.15 kg/m²   Oxygen Saturation Interpretation: Normal      ------------------------------------------ PROGRESS NOTES ------------------------------------------  6:14 PM EST  I have spoken with the patient and discussed todays results, in addition to providing specific details for the plan of care and counseling regarding the diagnosis and prognosis. Their questions are answered at this time and they are agreeable with the plan. I discussed at length with them reasons for immediate return here for re evaluation. They will followup with their primary care physician and orthopedic physician by calling their office tomorrow.       --------------------------------- ADDITIONAL PROVIDER NOTES ---------------------------------  At

## 2021-12-27 NOTE — ED PROVIDER NOTES
Radiology Procedure Waiver   Name: Susan Andersen  : 1950  MRN: [id number]    Date:  21    Time: 2:08 PM EST    Benefits of immediately proceeding with Radiology exam(s) without pre-testing outweigh the risks or are not indicated as specified below and therefore the following is/are being waived:    [] Pregnancy test   [] Patients LMP on-time and regular.   [] Patient had Tubal Ligation or has other Contraception Device. [] Patient  is Menopausal or Premenarcheal.    [] Patient had Full or Partial Hysterectomy. [] Protocol for Iodine allergy    [] MRI Questionnaire     [] BUN/Creatinine   [x] Patient age w/no hx of renal dysfunction. [] Patient on Dialysis. [x] Recent Normal Labs.   Electronically signed by Goyo Tay MD on 21 at 2:08 PM EST                Goyo Tay MD  21 [id number]

## 2022-07-11 ENCOUNTER — HOSPITAL ENCOUNTER (OUTPATIENT)
Dept: CT IMAGING | Age: 72
Discharge: HOME | End: 2022-07-11
Payer: MEDICARE

## 2022-07-11 DIAGNOSIS — R91.8 OTHER NONSPECIFIC ABNORMAL FINDING OF LUNG FIELD: ICD-10-CM

## 2022-07-11 PROCEDURE — 71250 CT THORAX DX C-: CPT

## 2022-09-30 NOTE — CARE COORDINATION
Margarito Mcnamara  [id number]  1963  9/30/2022    DIAGNOSIS: Recurrent BCC of the vertex scalp w/ invasion through cranium into superior sagittal sinus    REASON FOR VISIT: Routine scheduled follow-up.     HISTORY OF PRESENT ILLNESS:   Mr. Mcnamara is a 58M who was referred to Dr. Vidal for evaluation and treatment of recurrent basal cell carcinoma of the right forehead and scalp for possible resection.  He was previously treated with a hedgehog inhibitor, Erivedge, in 2018 with good response.  However, patient discontinued this prematurely after 6 months and the tumor has recurred. The tumor then recurred locally and, per imaging, the tumor was larger and had invaded through cranium into superior sagittal sinus.     He then underwent WLE w/ mesh/graft closure by Ksenia Vidal, Lauren, and Norma on 3/3/21 folllowed by reexcisions for (+)SM on 3/5/21.  He then underwent adj scalp/cranial RT @ 6600 cGy in 33 fxns, completed 6/11/21.    INTERVAL HISTORY:     Since his last visit, he has continue to endure slow healing of his scalp desquamation w/o any concerning regression or infection/ulceration.  His CT and MRI imaging studies from Sept 2022 remain w/ CHAN.  Also had several other lesions excised and bx'd by Dr. Vidal last month which all showed either clear SM's or no carcinoma (path below).          WLE's (8/24/22):        REVIEW OF SYSTEMS:  A complete review of systems was performed and the patient denies any acute concerns/changes other than per HPI    Past Medical History:   Diagnosis Date    ADHD (attention deficit hyperactivity disorder)     Arthritis     Basal cell carcinoma (BCC) of left side of neck 08/2022    Chronic pain     Depression     Hydrocele in adult     Hyperlipidemia     Hypertension     Migraine     Squamous cell skin cancer, face 08/2022     Past Surgical History:   Procedure Laterality Date    BACK SURGERY  11/2020    CRANIOTOMY N/A 3/3/2021    Procedure: CRANIOTOMY, USING FRAMELESS STEREOTAXY, OPEN,  [facility] Transitions Follow Up Call    2021    Patient: Karina Camacho  Patient : 1950   MRN: <[id number]>  Reason for Admission:  COPD exacerbation/COVID-19+  Discharge Date: 21 RARS: Readmission Risk Score: 11         Spoke with: Willis Batista states she is doing well, she is exhausted she has SOB upon exertion and has been using her Incentive spirometer. Using O2 at 2 LPM continuous. Denies chest pain, fever, chills. Using inhalers when needed. Has a curbside visit with PCP on Monday. Patient denies any concerns at present time. Needs to be reviewed by the provider   Additional needs identified to be addressed with provider No  none           Method of communication with provider : none      Care Transition Nurse (CTN) contacted the patient by telephone to follow up after admission on 21. Verified name and  with patient as identifiers. Addressed changes since last contact: symptom management-loss of energy, some SOB , no chest pain  Discharged needs reviewed: none  Follow up appointment completed? No    Advance Care Planning:   Does patient have an Advance Directive:  reviewed and current. CTN reviewed discharge instructions, medical action plan and red flags with patient and discussed any barriers to care and/or understanding of plan of care after discharge. Discussed appropriate site of care based on symptoms and resources available to patient including: PCP and When to call 911. The patient agrees to contact the PCP office for questions related to their healthcare. Patients top risk factors for readmission: medical condition-copd  Interventions to address risk factors: Obtained and reviewed discharge summary and/or continuity of care documents    final call based on severity of symptoms and risk factors. Plan for next call: final call  CTN provided contact information for future needs.           Care Transitions Subsequent and Final Call    Subsequent and Final BIFRONTAL;  Surgeon: Raheel Green MD;  Location: [facility] OR 2ND FLR;  Service: Neurosurgery;  Laterality: N/A;  TOR I  ASA I  TYPE & CROSS 2 UNITS  REGULAR BED  [place] HEADREST  [facility] CT    EXCISION OF LESION Left 8/24/2022    Procedure: EXCISION, LESION basal cell CA left neck--2 separate lesions;  Surgeon: Catalina Vidal MD;  Location: [facility] OR;  Service: ENT;  Laterality: Left;    FLAP PROCEDURE N/A 3/3/2021    Procedure: CREATION, FREE FLAP;  Surgeon: Ayaan Aguilar MD;  Location: [facility] OR 2ND FLR;  Service: ENT;  Laterality: N/A;  Latissimus free flap. Will need bean bag and [employer].     FLAP PROCEDURE Left 3/5/2021    Procedure: CREATION, FREE FLAP;  Surgeon: Ayaan Aguilar MD;  Location: [facility] OR 2ND FLR;  Service: ENT;  Laterality: Left;    LAPAROSCOPIC REPAIR OF HERNIA      RHIZOTOMY W/ RADIOFREQUENCY ABLATION Bilateral     two procedures    SURGICAL REMOVAL OF LESION OF FACE Right 8/24/2022    Procedure: EXCISION, LESION, FACE ;  Surgeon: Catalina Vidal MD;  Location: [facility] OR;  Service: ENT;  Laterality: Right;    TENDON REPAIR Right     thumb     Social History     Socioeconomic History    Marital status: Single    Number of children: 1   Occupational History     Comment: [employer]   Tobacco Use    Smoking status: Every Day     Packs/day: 0.50     Years: 10.00     Pack years: 5.00     Types: Cigarettes    Smokeless tobacco: Former    Tobacco comments:     1/2 pack per day   Substance and Sexual Activity    Alcohol use: Yes     Comment: ocassioanl beer    Drug use: Not Currently    Sexual activity: Yes     Partners: Female   Social History Narrative    Lives in a house with 25 year old son     Family History   Problem Relation Age of Onset    Depression Mother     Early death Mother 40    Alcohol abuse Father     Depression Father     Hypertension Brother     Headaches Sister      Medication List with Changes/Refills   Current Medications    ACETAMINOPHEN (TYLENOL) 325 MG TABLET    Take  Calls  Care Transitions Interventions  Other Interventions: Follow Up  No future appointments.     Familia Miller LPN 2 tablets (650 mg total) by mouth every 6 (six) hours as needed for Pain.    AMLODIPINE-BENAZEPRIL 10-20MG (LOTREL) 10-20 MG PER CAPSULE    Take 1 capsule by mouth once daily.    AMOXICILLIN (AMOXIL) 500 MG CAPSULE    Take 500 mg by mouth 3 (three) times daily.    ASPIRIN (ECOTRIN) 81 MG EC TABLET    TAKE 1 TABLET (81 MG TOTAL) BY MOUTH DAILY    ASPIRIN 81 MG CHEW    Take 81 mg by mouth once daily.    BACITRACIN 500 UNIT/GRAM OINTMENT    Apply topically 3 (three) times daily.    BACLOFEN (LIORESAL) 20 MG TABLET    Take 20 mg by mouth 3 (three) times daily as needed. Not taking    BISACODYL (DULCOLAX) 10 MG SUPP    Place 1 suppository (10 mg total) rectally daily as needed.    BUPROPION (WELLBUTRIN XL) 150 MG TB24 TABLET        BUTALBITAL-ACETAMINOPHEN-CAFFEINE -40 MG (FIORICET, ESGIC) -40 MG PER TABLET    Take 1 tablet by mouth 2 (two) times daily.    CALCIUM CARBONATE (TUMS) 200 MG CALCIUM (500 MG) CHEWABLE TABLET    Take 1 tablet (500 mg total) by mouth daily as needed.    DEXAMETHASONE (DECADRON) 2 MG TABLET    Take 1 tablet (2 mg total) by mouth 2 (two) times daily with meals.    DEXTROAMPHETAMINE-AMPHETAMINE (ADDERALL) 15 MG TABLET    Take 15 mg by mouth 2 (two) times daily.    DOCUSATE (COLACE) 50 MG/5 ML LIQUID    Take 10 mLs (100 mg total) by mouth once daily.    ENOXAPARIN (LOVENOX) 40 MG/0.4 ML SYRG    Inject 0.4 mLs (40 mg total) into the skin once daily.    GABAPENTIN (NEURONTIN) 300 MG CAPSULE    Take 600 mg by mouth 3 (three) times daily as needed.    IBUPROFEN (ADVIL,MOTRIN) 800 MG TABLET    TAKE 1 TABLET BY MOUTH EVERY 6 TO 8 HOURS AS NEEDED FOR PAIN    OXYCODONE (ROXICODONE) 10 MG TAB IMMEDIATE RELEASE TABLET    1 tablet as needed    QUETIAPINE (SEROQUEL) 25 MG TAB    Take 1 tablet (25 mg total) by mouth every evening.    ROSUVASTATIN (CRESTOR) 20 MG TABLET    1 tablet    SILDENAFIL (VIAGRA) 50 MG TABLET    Take 50 mg by mouth daily as needed.    SILVER SULFADIAZINE 1% (SILVADENE) 1 %  CREAM    Apply topically 2 (two) times daily.    TADALAFIL (CIALIS) 20 MG TAB    Take by mouth.    TRAMADOL (ULTRAM-ER) 100 MG TB24    Take 50 mg by mouth once daily.     ZOLPIDEM (AMBIEN) 10 MG TAB    1 tablet at bedtime as needed     Review of patient's allergies indicates:  No Known Allergies    QUALITY OF LIFE: 80%- Normal Activity with Effort: Some Symptoms of Disease    There were no vitals filed for this visit.  There is no height or weight on file to calculate BMI.    PHYSICAL EXAM:  GENERAL: alert; in no apparent distress.   HEAD: ~3x3cm area of desq of anterior midline scalp that is smaller and more healed than his last visit - no noted bleeding or active drainage, nor masses/ulceration/defect - right brow skin changes / keratoses unchanged w/ no change in brow ptosis  EYES: pupils are equal, round, reactive to light and accommodation. Sclera anicteric. Conjunctiva not injected.   NOSE/THROAT: no nasal erythema or rhinorrhea. Oropharynx pink, without erythema, ulcerations or thrush.   NECK: no cervical motion rigidity; supple with no masses; well-healed left neck longitudinal incision from recent WLE of other skin CA's by Dr. Vidal (path above)  CHEST: clear to auscultation bilaterally; no wheezes, crackles or rubs. Patient is speaking comfortably on room air with normal work of breathing without using accessory muscles of respiration.  CARDIOVASCULAR: regular rate and rhythm; no murmurs, rubs or gallops.  ABDOMEN: soft, nontender, nondistended. Bowel sounds present.   MUSCULOSKELETAL: no tenderness to palpation along the spine or scapulae. Normal range of motion.  NEUROLOGIC: cranial nerves II-XII intact bilaterally. Strength 5/5 in bilateral upper and lower extremities. No sensory deficits appreciated. Reflexes globally intact. No cerebellar signs. Normal gait.      ASSESSMENT: Margarito Mcnamara is a 58 y.o. male with h/o recurrent BCC of the vertex scalp w/ invasion through cranium into superior sagittal  sinus    PLAN:   - currently CHAN per all malignant sites mentioned above  - repeat CT neck/chest and brain MRI in 3m  - continue skin care as directed w/ silvadene and aquaphor  - continue f/u w/ ENT and dermatology  - RTC in 3m after next scans    All questions answered and contact information provided. Patient understands free to call us anytime with any questions or concerns regarding radiation therapy.    I have personally seen and evaluated this patient. Greater than 50% of this time was spent discussing coordination of care and/or counseling.    PHYSICIAN: Carlos Cherry III, MD

## 2023-03-29 ENCOUNTER — TRANSCRIBE ORDERS (OUTPATIENT)
Dept: ADMINISTRATIVE | Age: 73
End: 2023-03-29

## 2023-03-29 DIAGNOSIS — Z12.31 ENCOUNTER FOR SCREENING MAMMOGRAM FOR MALIGNANT NEOPLASM OF BREAST: Primary | ICD-10-CM

## 2023-05-30 ENCOUNTER — APPOINTMENT (OUTPATIENT)
Dept: GENERAL RADIOLOGY | Age: 73
End: 2023-05-30
Payer: MEDICARE

## 2023-05-30 ENCOUNTER — HOSPITAL ENCOUNTER (EMERGENCY)
Age: 73
Discharge: HOME | End: 2023-05-30
Attending: EMERGENCY MEDICINE
Payer: MEDICARE

## 2023-05-30 VITALS
DIASTOLIC BLOOD PRESSURE: 92 MMHG | HEART RATE: 96 BPM | RESPIRATION RATE: 16 BRPM | OXYGEN SATURATION: 97 % | TEMPERATURE: 97.1 F | SYSTOLIC BLOOD PRESSURE: 134 MMHG

## 2023-05-30 DIAGNOSIS — M25.512 ACUTE PAIN OF LEFT SHOULDER: Primary | ICD-10-CM

## 2023-05-30 PROCEDURE — 73030 X-RAY EXAM OF SHOULDER: CPT

## 2023-05-30 PROCEDURE — 96372 THER/PROPH/DIAG INJ SC/IM: CPT

## 2023-05-30 PROCEDURE — 6360000002 HC RX W HCPCS: Performed by: EMERGENCY MEDICINE

## 2023-05-30 PROCEDURE — 99284 EMERGENCY DEPT VISIT MOD MDM: CPT

## 2023-05-30 RX ORDER — KETOROLAC TROMETHAMINE 30 MG/ML
30 INJECTION, SOLUTION INTRAMUSCULAR; INTRAVENOUS ONCE
Status: COMPLETED | OUTPATIENT
Start: 2023-05-30 | End: 2023-05-30

## 2023-05-30 RX ORDER — LISINOPRIL 5 MG/1
5 TABLET ORAL DAILY
COMMUNITY

## 2023-05-30 RX ORDER — METOPROLOL SUCCINATE 25 MG/1
25 TABLET, EXTENDED RELEASE ORAL DAILY
COMMUNITY

## 2023-05-30 RX ORDER — FLUTICASONE FUROATE, UMECLIDINIUM BROMIDE AND VILANTEROL TRIFENATATE 200; 62.5; 25 UG/1; UG/1; UG/1
1 POWDER RESPIRATORY (INHALATION) DAILY
COMMUNITY

## 2023-05-30 RX ORDER — DEXAMETHASONE SODIUM PHOSPHATE 10 MG/ML
10 INJECTION, SOLUTION INTRAMUSCULAR; INTRAVENOUS ONCE
Status: COMPLETED | OUTPATIENT
Start: 2023-05-30 | End: 2023-05-30

## 2023-05-30 RX ORDER — HYDROCODONE BITARTRATE AND ACETAMINOPHEN 5; 325 MG/1; MG/1
1 TABLET ORAL EVERY 6 HOURS PRN
Qty: 12 TABLET | Refills: 0 | Status: SHIPPED | OUTPATIENT
Start: 2023-05-30 | End: 2023-06-02

## 2023-05-30 RX ADMIN — DEXAMETHASONE SODIUM PHOSPHATE 10 MG: 10 INJECTION, SOLUTION INTRAMUSCULAR; INTRAVENOUS at 11:13

## 2023-05-30 RX ADMIN — KETOROLAC TROMETHAMINE 30 MG: 30 INJECTION, SOLUTION INTRAMUSCULAR; INTRAVENOUS at 11:13

## 2023-05-30 ASSESSMENT — ENCOUNTER SYMPTOMS
SORE THROAT: 0
BACK PAIN: 0
BLOOD IN STOOL: 0
EYE PAIN: 0
COUGH: 0
DIARRHEA: 0
VOMITING: 0
EYE DISCHARGE: 0
SHORTNESS OF BREATH: 0
ABDOMINAL DISTENTION: 0
EYE REDNESS: 0
SINUS PRESSURE: 0
ABDOMINAL PAIN: 0
NAUSEA: 0
WHEEZING: 0

## 2023-05-30 ASSESSMENT — PAIN DESCRIPTION - DESCRIPTORS: DESCRIPTORS: ACHING;DULL

## 2023-05-30 ASSESSMENT — PAIN SCALES - GENERAL: PAINLEVEL_OUTOF10: 9

## 2023-05-30 ASSESSMENT — PAIN DESCRIPTION - ORIENTATION: ORIENTATION: LEFT

## 2023-05-30 ASSESSMENT — PAIN - FUNCTIONAL ASSESSMENT: PAIN_FUNCTIONAL_ASSESSMENT: 0-10

## 2023-05-30 ASSESSMENT — PAIN DESCRIPTION - LOCATION: LOCATION: SHOULDER;ARM

## 2023-05-30 NOTE — ED PROVIDER NOTES
The history is provided by the patient. Shoulder Problem  Location:  Shoulder  Shoulder location:  L shoulder  Injury: no    Pain details:     Quality:  Aching    Radiates to:  Does not radiate    Duration:  7 hours  Handedness:  Right-handed  Associated symptoms: no back pain and no fever       Review of Systems   Constitutional:  Negative for chills and fever. HENT:  Negative for ear pain, sinus pressure and sore throat. Eyes:  Negative for pain, discharge and redness. Respiratory:  Negative for cough, shortness of breath and wheezing. Cardiovascular:  Negative for chest pain. Gastrointestinal:  Negative for abdominal distention, abdominal pain, blood in stool, diarrhea, nausea and vomiting. Genitourinary:  Negative for dysuria and frequency. Musculoskeletal:  Negative for arthralgias and back pain. Skin:  Negative for rash and wound. Neurological:  Negative for weakness and headaches. Hematological:  Negative for adenopathy. All other systems reviewed and are negative. Physical Exam  Vitals and nursing note reviewed. Constitutional:       Appearance: She is well-developed. HENT:      Head: Normocephalic and atraumatic. Right Ear: Hearing and external ear normal.      Left Ear: Hearing and external ear normal.      Nose: Nose normal.      Mouth/Throat:      Pharynx: Uvula midline. Eyes:      General: Lids are normal.      Conjunctiva/sclera: Conjunctivae normal.      Pupils: Pupils are equal, round, and reactive to light. Cardiovascular:      Rate and Rhythm: Normal rate and regular rhythm. Heart sounds: Normal heart sounds. No murmur heard. Pulmonary:      Effort: Pulmonary effort is normal. No respiratory distress. Breath sounds: Normal breath sounds. No wheezing or rales. Abdominal:      General: Bowel sounds are normal.      Palpations: Abdomen is soft. Abdomen is not rigid. Tenderness: There is no abdominal tenderness.  There is no guarding or

## 2023-06-01 ENCOUNTER — HOSPITAL ENCOUNTER (OUTPATIENT)
Dept: MAMMOGRAPHY | Age: 73
Discharge: HOME | End: 2023-06-03
Payer: MEDICARE

## 2023-06-01 DIAGNOSIS — Z12.31 ENCOUNTER FOR SCREENING MAMMOGRAM FOR MALIGNANT NEOPLASM OF BREAST: ICD-10-CM

## 2023-06-01 PROCEDURE — 77063 BREAST TOMOSYNTHESIS BI: CPT

## 2023-06-17 ENCOUNTER — HOSPITAL ENCOUNTER (EMERGENCY)
Age: 73
Discharge: HOME | End: 2023-06-18
Attending: EMERGENCY MEDICINE
Payer: MEDICARE

## 2023-06-17 DIAGNOSIS — R07.9 CHEST PAIN, UNSPECIFIED TYPE: ICD-10-CM

## 2023-06-17 DIAGNOSIS — I10 PRIMARY HYPERTENSION: Primary | ICD-10-CM

## 2023-06-17 PROCEDURE — 99284 EMERGENCY DEPT VISIT MOD MDM: CPT

## 2023-06-17 ASSESSMENT — LIFESTYLE VARIABLES
HOW OFTEN DO YOU HAVE A DRINK CONTAINING ALCOHOL: NEVER
HOW MANY STANDARD DRINKS CONTAINING ALCOHOL DO YOU HAVE ON A TYPICAL DAY: PATIENT DOES NOT DRINK

## 2023-06-18 VITALS
RESPIRATION RATE: 20 BRPM | DIASTOLIC BLOOD PRESSURE: 77 MMHG | TEMPERATURE: 98.6 F | OXYGEN SATURATION: 98 % | SYSTOLIC BLOOD PRESSURE: 124 MMHG | HEART RATE: 71 BPM

## 2023-06-18 LAB
ALBUMIN SERPL-MCNC: 4.1 G/DL (ref 3.5–5.2)
ALP SERPL-CCNC: 99 U/L (ref 35–104)
ALT SERPL-CCNC: 9 U/L (ref 0–32)
ANION GAP SERPL CALCULATED.3IONS-SCNC: 10 MMOL/L (ref 7–16)
AST SERPL-CCNC: 15 U/L (ref 0–31)
BASOPHILS # BLD: 0.02 E9/L (ref 0–0.2)
BASOPHILS NFR BLD: 0.3 % (ref 0–2)
BILIRUB SERPL-MCNC: <0.2 MG/DL (ref 0–1.2)
BNP BLD-MCNC: 207 PG/ML (ref 0–125)
BUN SERPL-MCNC: 21 MG/DL (ref 6–23)
CALCIUM SERPL-MCNC: 8.8 MG/DL (ref 8.6–10.2)
CHLORIDE SERPL-SCNC: 105 MMOL/L (ref 98–107)
CO2 SERPL-SCNC: 22 MMOL/L (ref 22–29)
CREAT SERPL-MCNC: 0.8 MG/DL (ref 0.5–1)
EKG ATRIAL RATE: 74 BPM
EKG P AXIS: 52 DEGREES
EKG P-R INTERVAL: 146 MS
EKG Q-T INTERVAL: 398 MS
EKG QRS DURATION: 84 MS
EKG QTC CALCULATION (BAZETT): 441 MS
EKG R AXIS: -24 DEGREES
EKG T AXIS: 24 DEGREES
EKG VENTRICULAR RATE: 74 BPM
EOSINOPHIL # BLD: 0.01 E9/L (ref 0.05–0.5)
EOSINOPHIL NFR BLD: 0.1 % (ref 0–6)
ERYTHROCYTE [DISTWIDTH] IN BLOOD BY AUTOMATED COUNT: 12.4 FL (ref 11.5–15)
GLUCOSE SERPL-MCNC: 123 MG/DL (ref 74–99)
HCT VFR BLD AUTO: 37.8 % (ref 34–48)
HGB BLD-MCNC: 12.8 G/DL (ref 11.5–15.5)
IMM GRANULOCYTES # BLD: 0.03 E9/L
IMM GRANULOCYTES NFR BLD: 0.4 % (ref 0–5)
LYMPHOCYTES # BLD: 0.77 E9/L (ref 1.5–4)
LYMPHOCYTES NFR BLD: 10.6 % (ref 20–42)
MCH RBC QN AUTO: 32.1 PG (ref 26–35)
MCHC RBC AUTO-ENTMCNC: 33.9 % (ref 32–34.5)
MCV RBC AUTO: 94.7 FL (ref 80–99.9)
MONOCYTES # BLD: 0.18 E9/L (ref 0.1–0.95)
MONOCYTES NFR BLD: 2.5 % (ref 2–12)
NEUTROPHILS # BLD: 6.26 E9/L (ref 1.8–7.3)
NEUTS SEG NFR BLD: 86.1 % (ref 43–80)
PLATELET # BLD AUTO: 274 E9/L (ref 130–450)
PMV BLD AUTO: 10 FL (ref 7–12)
POTASSIUM SERPL-SCNC: 4.2 MMOL/L (ref 3.5–5)
PROT SERPL-MCNC: 7.6 G/DL (ref 6.4–8.3)
RBC # BLD AUTO: 3.99 E12/L (ref 3.5–5.5)
SODIUM SERPL-SCNC: 137 MMOL/L (ref 132–146)
TROPONIN, HIGH SENSITIVITY: 10 NG/L (ref 0–9)
TROPONIN, HIGH SENSITIVITY: 9 NG/L (ref 0–9)
WBC # BLD: 7.3 E9/L (ref 4.5–11.5)

## 2023-06-18 PROCEDURE — 85025 COMPLETE CBC W/AUTO DIFF WBC: CPT

## 2023-06-18 PROCEDURE — 93005 ELECTROCARDIOGRAM TRACING: CPT

## 2023-06-18 PROCEDURE — 80053 COMPREHEN METABOLIC PANEL: CPT

## 2023-06-18 PROCEDURE — 83880 ASSAY OF NATRIURETIC PEPTIDE: CPT

## 2023-06-18 PROCEDURE — 84484 ASSAY OF TROPONIN QUANT: CPT

## 2023-06-18 PROCEDURE — 93010 ELECTROCARDIOGRAM REPORT: CPT | Performed by: INTERNAL MEDICINE

## 2023-06-19 ASSESSMENT — ENCOUNTER SYMPTOMS
CHEST TIGHTNESS: 0
SORE THROAT: 0
DIARRHEA: 0
COUGH: 0
PHOTOPHOBIA: 0
RHINORRHEA: 0
ABDOMINAL PAIN: 0
VOMITING: 0
BACK PAIN: 0
NAUSEA: 0
SHORTNESS OF BREATH: 0

## 2023-11-24 ENCOUNTER — HOSPITAL ENCOUNTER (EMERGENCY)
Age: 73
Discharge: HOME | End: 2023-11-25
Attending: EMERGENCY MEDICINE
Payer: MEDICARE

## 2023-11-24 DIAGNOSIS — S39.012A STRAIN OF LUMBAR REGION, INITIAL ENCOUNTER: Primary | ICD-10-CM

## 2023-11-24 PROCEDURE — 96374 THER/PROPH/DIAG INJ IV PUSH: CPT

## 2023-11-24 PROCEDURE — 99284 EMERGENCY DEPT VISIT MOD MDM: CPT

## 2023-11-24 PROCEDURE — 96375 TX/PRO/DX INJ NEW DRUG ADDON: CPT

## 2023-11-24 RX ORDER — DEXAMETHASONE SODIUM PHOSPHATE 10 MG/ML
10 INJECTION INTRAMUSCULAR; INTRAVENOUS ONCE
Status: COMPLETED | OUTPATIENT
Start: 2023-11-24 | End: 2023-11-25

## 2023-11-24 RX ORDER — KETOROLAC TROMETHAMINE 15 MG/ML
15 INJECTION, SOLUTION INTRAMUSCULAR; INTRAVENOUS ONCE
Status: COMPLETED | OUTPATIENT
Start: 2023-11-24 | End: 2023-11-25

## 2023-11-24 ASSESSMENT — PAIN DESCRIPTION - PAIN TYPE: TYPE: ACUTE PAIN

## 2023-11-24 ASSESSMENT — PAIN DESCRIPTION - FREQUENCY: FREQUENCY: CONTINUOUS

## 2023-11-24 ASSESSMENT — LIFESTYLE VARIABLES: HOW OFTEN DO YOU HAVE A DRINK CONTAINING ALCOHOL: NEVER

## 2023-11-24 ASSESSMENT — PAIN - FUNCTIONAL ASSESSMENT: PAIN_FUNCTIONAL_ASSESSMENT: 0-10

## 2023-11-24 ASSESSMENT — PAIN DESCRIPTION - LOCATION: LOCATION: BACK

## 2023-11-24 ASSESSMENT — PAIN DESCRIPTION - ORIENTATION: ORIENTATION: MID

## 2023-11-24 ASSESSMENT — PAIN SCALES - GENERAL: PAINLEVEL_OUTOF10: 10

## 2023-11-25 ENCOUNTER — APPOINTMENT (OUTPATIENT)
Dept: CT IMAGING | Age: 73
End: 2023-11-25
Payer: MEDICARE

## 2023-11-25 VITALS
OXYGEN SATURATION: 97 % | DIASTOLIC BLOOD PRESSURE: 69 MMHG | RESPIRATION RATE: 18 BRPM | WEIGHT: 135 LBS | SYSTOLIC BLOOD PRESSURE: 123 MMHG | HEIGHT: 66 IN | HEART RATE: 85 BPM | TEMPERATURE: 97.9 F | BODY MASS INDEX: 21.69 KG/M2

## 2023-11-25 PROCEDURE — 72131 CT LUMBAR SPINE W/O DYE: CPT

## 2023-11-25 PROCEDURE — 96374 THER/PROPH/DIAG INJ IV PUSH: CPT

## 2023-11-25 PROCEDURE — 6370000000 HC RX 637 (ALT 250 FOR IP)

## 2023-11-25 PROCEDURE — 96375 TX/PRO/DX INJ NEW DRUG ADDON: CPT

## 2023-11-25 PROCEDURE — 6360000002 HC RX W HCPCS

## 2023-11-25 RX ORDER — HYDROCODONE BITARTRATE AND ACETAMINOPHEN 5; 325 MG/1; MG/1
1 TABLET ORAL ONCE
Status: COMPLETED | OUTPATIENT
Start: 2023-11-25 | End: 2023-11-25

## 2023-11-25 RX ORDER — HYDROCODONE BITARTRATE AND ACETAMINOPHEN 5; 325 MG/1; MG/1
1 TABLET ORAL EVERY 8 HOURS PRN
Qty: 9 TABLET | Refills: 0 | Status: SHIPPED | OUTPATIENT
Start: 2023-11-25 | End: 2023-11-28

## 2023-11-25 RX ADMIN — HYDROCODONE BITARTRATE AND ACETAMINOPHEN 1 TABLET: 5; 325 TABLET ORAL at 01:42

## 2023-11-25 RX ADMIN — DEXAMETHASONE SODIUM PHOSPHATE 10 MG: 10 INJECTION INTRAMUSCULAR; INTRAVENOUS at 00:37

## 2023-11-25 RX ADMIN — KETOROLAC TROMETHAMINE 15 MG: 15 INJECTION, SOLUTION INTRAMUSCULAR; INTRAVENOUS at 00:38

## 2023-11-25 ASSESSMENT — PAIN SCALES - GENERAL
PAINLEVEL_OUTOF10: 10
PAINLEVEL_OUTOF10: 10

## 2023-11-25 ASSESSMENT — PAIN DESCRIPTION - LOCATION: LOCATION: BACK

## 2023-11-25 ASSESSMENT — PAIN DESCRIPTION - ORIENTATION: ORIENTATION: MID

## 2023-11-25 NOTE — ED PROVIDER NOTES
[address]        Pt Name: Giuliana Sanchez  MRN: [id number]  [facility] 1950  Date of evaluation: 11/24/2023  Provider: Jossue Tran DO  PCP: Jackelyn Calderon DO  Note Started: 11:27 PM EST 11/24/23    CHIEF COMPLAINT       Chief Complaint   Patient presents with    Back Pain     Mid back pain after lifting turkey       HISTORY OF PRESENT ILLNESS: 1 or more Elements   History From: patient    Limitations to history : None    Giuliana Sanchez is a 72 y.o. female who presents to the emergency department for lower back pain that started yesterday after she was lifting a turkey. She reports that she was able to ambulate but the pain was worse today so she came to emergency department for evaluation. She tried taking ibuprofen at home without improvement. She does have history of fracture to her thoracic/lumbar region. Denies urinary or bladder changes. No saddle paresthesias. Reports there is radiating pain down her right leg. Patient denies fever, chills, headache, shortness of breath, chest pain, abdominal pain, nausea, vomiting, diarrhea, lightheadedness, dysuria, hematuria, hematochezia, and melena. Nursing Notes were all reviewed and agreed with or any disagreements were addressed in the HPI. REVIEW OF SYSTEMS :           Positives and Pertinent negatives as per HPI.      SURGICAL HISTORY     Past Surgical History:   Procedure Laterality Date    APPENDECTOMY      OVARY REMOVAL      also tube on right removed    TONSILLECTOMY AND ADENOIDECTOMY      TUBAL LIGATION Bilateral     UTERINE SUSPENSION         CURRENTMEDICATIONS       Discharge Medication List as of 11/25/2023  2:40 AM        CONTINUE these medications which have NOT CHANGED    Details   fluticasone-umeclidin-vilant (TRELEGY ELLIPTA) 200-62.5-25 MCG/ACT AEPB inhaler Inhale 1 puff into the lungs dailyHistorical Med      metoprolol succinate (TOPROL XL)

## 2023-11-26 ENCOUNTER — HOSPITAL ENCOUNTER (EMERGENCY)
Age: 73
Discharge: HOME | End: 2023-11-26
Attending: EMERGENCY MEDICINE
Payer: MEDICARE

## 2023-11-26 ENCOUNTER — APPOINTMENT (OUTPATIENT)
Dept: GENERAL RADIOLOGY | Age: 73
End: 2023-11-26
Payer: MEDICARE

## 2023-11-26 VITALS
DIASTOLIC BLOOD PRESSURE: 65 MMHG | HEART RATE: 89 BPM | OXYGEN SATURATION: 96 % | TEMPERATURE: 97.8 F | RESPIRATION RATE: 18 BRPM | SYSTOLIC BLOOD PRESSURE: 115 MMHG

## 2023-11-26 DIAGNOSIS — R11.0 NAUSEA: Primary | ICD-10-CM

## 2023-11-26 LAB
ALBUMIN SERPL-MCNC: 4 G/DL (ref 3.5–5.2)
ALP SERPL-CCNC: 90 U/L (ref 35–104)
ALT SERPL-CCNC: 7 U/L (ref 0–32)
ANION GAP SERPL CALCULATED.3IONS-SCNC: 10 MMOL/L (ref 7–16)
AST SERPL-CCNC: 15 U/L (ref 0–31)
BASOPHILS # BLD: 0.05 K/UL (ref 0–0.2)
BASOPHILS NFR BLD: 1 % (ref 0–2)
BILIRUB SERPL-MCNC: 0.3 MG/DL (ref 0–1.2)
BUN SERPL-MCNC: 15 MG/DL (ref 6–23)
CALCIUM SERPL-MCNC: 8.7 MG/DL (ref 8.6–10.2)
CHLORIDE SERPL-SCNC: 104 MMOL/L (ref 98–107)
CO2 SERPL-SCNC: 25 MMOL/L (ref 22–29)
CREAT SERPL-MCNC: 0.7 MG/DL (ref 0.5–1)
EKG ATRIAL RATE: 84 BPM
EKG P AXIS: 76 DEGREES
EKG P-R INTERVAL: 138 MS
EKG Q-T INTERVAL: 380 MS
EKG QRS DURATION: 82 MS
EKG QTC CALCULATION (BAZETT): 449 MS
EKG R AXIS: -54 DEGREES
EKG T AXIS: 32 DEGREES
EKG VENTRICULAR RATE: 84 BPM
EOSINOPHIL # BLD: 0.12 K/UL (ref 0.05–0.5)
EOSINOPHILS RELATIVE PERCENT: 1 % (ref 0–6)
ERYTHROCYTE [DISTWIDTH] IN BLOOD BY AUTOMATED COUNT: 13 % (ref 11.5–15)
GFR SERPL CREATININE-BSD FRML MDRD: >60 ML/MIN/1.73M2
GLUCOSE SERPL-MCNC: 73 MG/DL (ref 74–99)
HCT VFR BLD AUTO: 38.8 % (ref 34–48)
HGB BLD-MCNC: 12.8 G/DL (ref 11.5–15.5)
IMM GRANULOCYTES # BLD AUTO: 0.04 K/UL (ref 0–0.58)
IMM GRANULOCYTES NFR BLD: 0 % (ref 0–5)
INFLUENZA A BY PCR: NOT DETECTED
INFLUENZA B BY PCR: NOT DETECTED
LIPASE SERPL-CCNC: 51 U/L (ref 13–60)
LYMPHOCYTES NFR BLD: 1.57 K/UL (ref 1.5–4)
LYMPHOCYTES RELATIVE PERCENT: 17 % (ref 20–42)
MCH RBC QN AUTO: 31.2 PG (ref 26–35)
MCHC RBC AUTO-ENTMCNC: 33 G/DL (ref 32–34.5)
MCV RBC AUTO: 94.6 FL (ref 80–99.9)
MONOCYTES NFR BLD: 0.96 K/UL (ref 0.1–0.95)
MONOCYTES NFR BLD: 10 % (ref 2–12)
NEUTROPHILS NFR BLD: 71 % (ref 43–80)
NEUTS SEG NFR BLD: 6.74 K/UL (ref 1.8–7.3)
PLATELET # BLD AUTO: 280 K/UL (ref 130–450)
PMV BLD AUTO: 10 FL (ref 7–12)
POTASSIUM SERPL-SCNC: 3.6 MMOL/L (ref 3.5–5)
PROT SERPL-MCNC: 7.3 G/DL (ref 6.4–8.3)
RBC # BLD AUTO: 4.1 M/UL (ref 3.5–5.5)
SARS-COV-2 RDRP RESP QL NAA+PROBE: NOT DETECTED
SODIUM SERPL-SCNC: 139 MMOL/L (ref 132–146)
SPECIMEN DESCRIPTION: NORMAL
TROPONIN I SERPL HS-MCNC: 10 NG/L (ref 0–9)
TROPONIN I SERPL HS-MCNC: 9 NG/L (ref 0–9)
WBC OTHER # BLD: 9.5 K/UL (ref 4.5–11.5)

## 2023-11-26 PROCEDURE — 80053 COMPREHEN METABOLIC PANEL: CPT

## 2023-11-26 PROCEDURE — 96374 THER/PROPH/DIAG INJ IV PUSH: CPT

## 2023-11-26 PROCEDURE — 2580000003 HC RX 258

## 2023-11-26 PROCEDURE — 93010 ELECTROCARDIOGRAM REPORT: CPT | Performed by: INTERNAL MEDICINE

## 2023-11-26 PROCEDURE — 71046 X-RAY EXAM CHEST 2 VIEWS: CPT

## 2023-11-26 PROCEDURE — 87635 SARS-COV-2 COVID-19 AMP PRB: CPT

## 2023-11-26 PROCEDURE — 99285 EMERGENCY DEPT VISIT HI MDM: CPT

## 2023-11-26 PROCEDURE — 87502 INFLUENZA DNA AMP PROBE: CPT

## 2023-11-26 PROCEDURE — 93005 ELECTROCARDIOGRAM TRACING: CPT

## 2023-11-26 PROCEDURE — 6360000002 HC RX W HCPCS

## 2023-11-26 PROCEDURE — 83690 ASSAY OF LIPASE: CPT

## 2023-11-26 PROCEDURE — 85025 COMPLETE CBC W/AUTO DIFF WBC: CPT

## 2023-11-26 PROCEDURE — 84484 ASSAY OF TROPONIN QUANT: CPT

## 2023-11-26 RX ORDER — ONDANSETRON 2 MG/ML
4 INJECTION INTRAMUSCULAR; INTRAVENOUS ONCE
Status: COMPLETED | OUTPATIENT
Start: 2023-11-26 | End: 2023-11-26

## 2023-11-26 RX ORDER — LIDOCAINE 50 MG/G
1 PATCH TOPICAL DAILY
Qty: 10 PATCH | Refills: 0 | Status: SHIPPED | OUTPATIENT
Start: 2023-11-26 | End: 2023-12-06

## 2023-11-26 RX ORDER — 0.9 % SODIUM CHLORIDE 0.9 %
1000 INTRAVENOUS SOLUTION INTRAVENOUS ONCE
Status: COMPLETED | OUTPATIENT
Start: 2023-11-26 | End: 2023-11-26

## 2023-11-26 RX ADMIN — ONDANSETRON 4 MG: 2 INJECTION INTRAMUSCULAR; INTRAVENOUS at 15:16

## 2023-11-26 RX ADMIN — SODIUM CHLORIDE 1000 ML: 9 INJECTION, SOLUTION INTRAVENOUS at 15:16

## 2023-11-26 NOTE — ED PROVIDER NOTES
[address]        Pt Name: Nova Salgado  MRN: [id number]  [address] [facility] 1950  Date of evaluation: 11/26/2023  Provider: Damion Mckeon DO  PCP: Laura Light DO  Note Started: 3:10 PM EST 11/26/23    CHIEF COMPLAINT       Chief Complaint   Patient presents with    Illness     Pt injured her back on Thanksgiving was seen here Friday, prescribed Glenora Hurtado has been taking them since Friday. Today pt took one and then about 15-20mins started feeling very weak, nausea and daughter states pt looked pale and lips were grey. HISTORY OF PRESENT ILLNESS: 1 or more Elements   History From: patient    Limitations to history : None    Nova Salgado is a 72 y.o. female who presents to the emergency department for nausea and lightheadedness at home. She reports that she ate 1 piece of toast and then took half a Norco for her lower back pain and started to experience nausea, shortness of breath, you can you contribute contribute and daughter was concerned so she brought the patient to the emergency department for evaluation. Patient reports this happened approximately 1 hour ago, lasted about 20 minutes and is feeling better now. Wears 3 L NC O2 at baseline, no changes to her oxygen requirement. Patient denies fever, chills, headache, chest pain, abdominal pain, vomiting, diarrhea, lightheadedness, dysuria, hematuria, hematochezia, and melena. She also reports intermittent cough since yesterday. Nursing Notes were all reviewed and agreed with or any disagreements were addressed in the HPI. REVIEW OF SYSTEMS :           Positives and Pertinent negatives as per HPI.      SURGICAL HISTORY     Past Surgical History:   Procedure Laterality Date    APPENDECTOMY      OVARY REMOVAL      also tube on right removed    TONSILLECTOMY AND ADENOIDECTOMY      TUBAL LIGATION Bilateral     UTERINE SUSPENSION

## 2023-11-26 NOTE — ED NOTES
Pt is at [facility], [address] [place] [address] [address], [place]  44/13/97 [id number]

## 2023-11-27 NOTE — DISCHARGE INSTR - COC
Continuity of Care Form    Patient Name: Gabrielle Rodney   :  1950  MRN:  [id number]    [address] date:  2023  Discharge date:  ***    Code Status Order: Prior   Advance Directives:     Admitting Physician:  No admitting provider for patient encounter. PCP: Lucie Ellison DO    Discharging Nurse: [facility] Unit/Room#:   Discharging Unit Phone Number: ***    Emergency Contact:   Extended Emergency Contact Information  Primary Emergency Contact: [facility] Phone: [phone]  Mobile Phone: [phone]  Relation: Child   needed? No  Secondary Emergency Contact: Zana [facility] [address] [facility] Phone: [phone]  Mobile Phone: [phone]  Relation: Brother/Sister   needed? No    Past Surgical History:  Past Surgical History:   Procedure Laterality Date    APPENDECTOMY      OVARY REMOVAL      also tube on right removed    TONSILLECTOMY AND ADENOIDECTOMY      TUBAL LIGATION Bilateral     UTERINE SUSPENSION         Immunization History: There is no immunization history on file for this patient.     Active Problems:  Patient Active Problem List   Diagnosis Code    COPD (chronic obstructive pulmonary disease) ([address]) J44.9    Anxiety F41.9    CAD (coronary artery disease) I25.10    Tobacco abuse Z72.0    Hypothyroidism E03.9    GERD (gastroesophageal reflux disease) K21.9    Dyslipidemia E78.5    TIA (transient ischemic attack) G45.9    Thoracic outlet syndrome G54.0    CAP (community acquired pneumonia) J18.9    Syncope R55    Closed compression fracture of lumbar vertebra (HCC) S32.000A    COPD exacerbation ([address]) J44.1       Isolation/Infection:   Isolation            No Isolation          Patient Infection Status       Infection Onset Added Last Indicated Last Indicated By Review Planned Expiration Resolved Resolved By    None active    Resolved    COVID-19 21 Respiratory Panel, Molecular,

## 2024-04-30 PROBLEM — I10 PRIMARY HYPERTENSION: Status: ACTIVE | Noted: 2024-04-30

## 2024-05-22 ENCOUNTER — OFFICE VISIT (OUTPATIENT)
Dept: CARDIOLOGY CLINIC | Age: 74
End: 2024-05-22
Payer: MEDICARE

## 2024-05-22 VITALS
DIASTOLIC BLOOD PRESSURE: 52 MMHG | SYSTOLIC BLOOD PRESSURE: 94 MMHG | BODY MASS INDEX: 20.25 KG/M2 | HEART RATE: 99 BPM | RESPIRATION RATE: 18 BRPM | WEIGHT: 126 LBS | HEIGHT: 66 IN

## 2024-05-22 DIAGNOSIS — R00.2 PALPITATIONS: ICD-10-CM

## 2024-05-22 DIAGNOSIS — I10 PRIMARY HYPERTENSION: ICD-10-CM

## 2024-05-22 DIAGNOSIS — J96.11 CHRONIC HYPOXIC RESPIRATORY FAILURE (HCC): ICD-10-CM

## 2024-05-22 DIAGNOSIS — J44.9 CHRONIC OBSTRUCTIVE PULMONARY DISEASE, UNSPECIFIED COPD TYPE (HCC): Primary | ICD-10-CM

## 2024-05-22 DIAGNOSIS — R06.09 EXERTIONAL DYSPNEA: ICD-10-CM

## 2024-05-22 PROCEDURE — 99205 OFFICE O/P NEW HI 60 MIN: CPT | Performed by: INTERNAL MEDICINE

## 2024-05-22 PROCEDURE — 1123F ACP DISCUSS/DSCN MKR DOCD: CPT | Performed by: INTERNAL MEDICINE

## 2024-05-22 PROCEDURE — 3074F SYST BP LT 130 MM HG: CPT | Performed by: INTERNAL MEDICINE

## 2024-05-22 PROCEDURE — 93000 ELECTROCARDIOGRAM COMPLETE: CPT | Performed by: INTERNAL MEDICINE

## 2024-05-22 PROCEDURE — 3078F DIAST BP <80 MM HG: CPT | Performed by: INTERNAL MEDICINE

## 2024-05-22 NOTE — PROGRESS NOTES
OUTPATIENT CARDIOLOGY CONSULT    Name: Vita Avila    Age: 74 y.o.    Date of Service: 5/22/2024    Reason for Consultation: Chronic obstructive lung disease with associated chronic hypoxic respiratory failure, exertional palpitations, hypertension    Referring Physician: Jacky Estrella DO    History of Present Illness: The patient is a 74-year-old white female with no known history of structural heart disease and a risk profile of hypertension in addition to that of chronic obstructive lung disease with associated chronic hypoxic respiratory failure and ongoing tobacco consumption.  She presents with symptoms predominantly that of exertional dyspnea in the absence of additional manifestations suggestive of anginal-like chest discomfort or other ischemic equivalents nor additional manifestations of decompensated left ventricular systolic dysfunction or volume overload.  On occasion she will note lightheadedness with heart rates reported on automated blood pressure cuff assessment during these periods of approximately 30 bpm by report.  She recently underwent evaluation in your office with ambulatory pulse oximetry documenting the presence of desaturation and spite of her previously administered supplemental oxygen dosing with requirements of increasing dosing at that time.  Unfortunately, during that period no recording of heart rates accompanied the existing report at the time of present evaluation on her existing antihypertensive medical regimen relative hypotension is noted in the absence of symptoms at the time of her assessment.  At the time of evaluation she continues active tobacco consumption of approximately 1/2 pack of cigarettes on a daily basis.  In addition, she relates reports of a prior abdominal aortic aneurysm, albeit with most recent objective assessment that of an abdominal CT scan of approximately 4 years earlier demonstrating mild increase of her aortic dimensions not of aneurysmal

## 2024-05-22 NOTE — PROGRESS NOTES
Patient was seen today and a 7day monitor was placed. Monitor was ordered by Dr. Bethea. The monitor was applied, instructions were given to the patient. Patient stated understanding and gave verbalize readback.   Monitor company: [employer]  Serial number: [id number]

## 2024-06-07 DIAGNOSIS — R00.2 PALPITATIONS: ICD-10-CM

## 2024-06-25 ENCOUNTER — TELEPHONE (OUTPATIENT)
Dept: CARDIOLOGY CLINIC | Age: 74
End: 2024-06-25

## 2024-06-25 NOTE — TELEPHONE ENCOUNTER
----- Message from Lucio Bethea MD sent at 6/24/2024  1:27 PM EDT -----  Please notify patient that arrhythmia monitor was reviewed and demonstrates no significant electrical issues requiring alteration of her existing medical regimen.  Will review her echocardiogram upon completion and provide additional recommendations as appropriate

## 2024-08-12 ENCOUNTER — TELEPHONE (OUTPATIENT)
Dept: CARDIOLOGY | Age: 74
End: 2024-08-12

## 2024-08-12 NOTE — TELEPHONE ENCOUNTER
Contacted pt to reschedule echo appt. Left VM. Patient called right back, rescheduled for 8/28/24 8:00am. Pt missed previous appt due to transportation problem and she had no phone at the time to call and reschedule.

## 2024-08-28 ENCOUNTER — HOSPITAL ENCOUNTER (OUTPATIENT)
Dept: CARDIOLOGY | Age: 74
Discharge: HOME | End: 2024-08-30
Attending: INTERNAL MEDICINE
Payer: MEDICARE

## 2024-08-28 VITALS — BODY MASS INDEX: 20.25 KG/M2 | HEIGHT: 66 IN | WEIGHT: 126 LBS

## 2024-08-28 DIAGNOSIS — R06.09 EXERTIONAL DYSPNEA: ICD-10-CM

## 2024-08-28 LAB
ECHO AV AREA PEAK VELOCITY: 2.2 CM2
ECHO AV AREA VTI: 2.8 CM2
ECHO AV AREA/BSA PEAK VELOCITY: 1.3 CM2/M2
ECHO AV AREA/BSA VTI: 1.7 CM2/M2
ECHO AV CUSP MM: 1.9 CM
ECHO AV MEAN GRADIENT: 3 MMHG
ECHO AV MEAN VELOCITY: 0.8 M/S
ECHO AV PEAK GRADIENT: 5 MMHG
ECHO AV PEAK VELOCITY: 1.1 M/S
ECHO AV VELOCITY RATIO: 0.73
ECHO AV VTI: 16.5 CM
ECHO BSA: 1.63 M2
ECHO LA DIAMETER INDEX: 1.77 CM/M2
ECHO LA DIAMETER: 2.9 CM
ECHO LA VOL A-L A2C: 29 ML (ref 22–52)
ECHO LA VOL A-L A4C: 35 ML (ref 22–52)
ECHO LA VOL MOD A2C: 24 ML (ref 22–52)
ECHO LA VOL MOD A4C: 33 ML (ref 22–52)
ECHO LA VOLUME AREA LENGTH: 33 ML
ECHO LA VOLUME INDEX A-L A2C: 18 ML/M2 (ref 16–34)
ECHO LA VOLUME INDEX A-L A4C: 21 ML/M2 (ref 16–34)
ECHO LA VOLUME INDEX AREA LENGTH: 20 ML/M2 (ref 16–34)
ECHO LA VOLUME INDEX MOD A2C: 15 ML/M2 (ref 16–34)
ECHO LA VOLUME INDEX MOD A4C: 20 ML/M2 (ref 16–34)
ECHO LV EF PHYSICIAN: 60 %
ECHO LV FRACTIONAL SHORTENING: 26 % (ref 28–44)
ECHO LV INTERNAL DIMENSION DIASTOLE INDEX: 2.07 CM/M2
ECHO LV INTERNAL DIMENSION DIASTOLIC: 3.4 CM (ref 3.9–5.3)
ECHO LV INTERNAL DIMENSION SYSTOLIC INDEX: 1.52 CM/M2
ECHO LV INTERNAL DIMENSION SYSTOLIC: 2.5 CM
ECHO LV ISOVOLUMETRIC RELAXATION TIME (IVRT): 121.1 MS
ECHO LV IVSD: 1 CM (ref 0.6–0.9)
ECHO LV IVSS: 1.2 CM
ECHO LV MASS 2D: 98.9 G (ref 67–162)
ECHO LV MASS INDEX 2D: 60.3 G/M2 (ref 43–95)
ECHO LV POSTERIOR WALL DIASTOLIC: 1 CM (ref 0.6–0.9)
ECHO LV POSTERIOR WALL SYSTOLIC: 1.2 CM
ECHO LV RELATIVE WALL THICKNESS RATIO: 0.59
ECHO LVOT AREA: 3.1 CM2
ECHO LVOT AV VTI INDEX: 0.87
ECHO LVOT DIAM: 2 CM
ECHO LVOT MEAN GRADIENT: 1 MMHG
ECHO LVOT PEAK GRADIENT: 2 MMHG
ECHO LVOT PEAK VELOCITY: 0.8 M/S
ECHO LVOT STROKE VOLUME INDEX: 27.4 ML/M2
ECHO LVOT SV: 44.9 ML
ECHO LVOT VTI: 14.3 CM
ECHO MV "A" WAVE DURATION: 148.8 MSEC
ECHO MV A VELOCITY: 0.74 M/S
ECHO MV AREA PHT: 4.3 CM2
ECHO MV AREA VTI: 3 CM2
ECHO MV E DECELERATION TIME (DT): 184.3 MS
ECHO MV E VELOCITY: 0.43 M/S
ECHO MV E/A RATIO: 0.58
ECHO MV LVOT VTI INDEX: 1.04
ECHO MV MAX VELOCITY: 0.9 M/S
ECHO MV MEAN GRADIENT: 2 MMHG
ECHO MV MEAN VELOCITY: 0.6 M/S
ECHO MV PEAK GRADIENT: 3 MMHG
ECHO MV PRESSURE HALF TIME (PHT): 50.7 MS
ECHO MV VTI: 14.9 CM
ECHO PV MAX VELOCITY: 0.8 M/S
ECHO PV MEAN GRADIENT: 1 MMHG
ECHO PV MEAN VELOCITY: 0.6 M/S
ECHO PV PEAK GRADIENT: 3 MMHG
ECHO PV VTI: 12.4 CM
ECHO RV INTERNAL DIMENSION: 3 CM
ECHO TV REGURGITANT MAX VELOCITY: 2.81 M/S
ECHO TV REGURGITANT PEAK GRADIENT: 32 MMHG

## 2024-08-28 PROCEDURE — 2580000003 HC RX 258: Performed by: INTERNAL MEDICINE

## 2024-08-28 PROCEDURE — 93306 TTE W/DOPPLER COMPLETE: CPT | Performed by: INTERNAL MEDICINE

## 2024-08-28 PROCEDURE — 93306 TTE W/DOPPLER COMPLETE: CPT

## 2024-08-28 RX ORDER — SODIUM CHLORIDE 0.9 % (FLUSH) 0.9 %
10 SYRINGE (ML) INJECTION PRN
Status: DISCONTINUED | OUTPATIENT
Start: 2024-08-28 | End: 2024-08-31 | Stop reason: HOSPADM

## 2024-08-28 RX ADMIN — SODIUM CHLORIDE, PRESERVATIVE FREE 10 ML: 5 INJECTION INTRAVENOUS at 08:55

## 2024-08-28 RX ADMIN — SODIUM CHLORIDE, PRESERVATIVE FREE 10 ML: 5 INJECTION INTRAVENOUS at 08:58

## 2024-08-29 ENCOUNTER — TELEPHONE (OUTPATIENT)
Dept: CARDIOLOGY CLINIC | Age: 74
End: 2024-08-29

## 2024-08-29 NOTE — TELEPHONE ENCOUNTER
----- Message from Dr. Lucio Bethea MD sent at 8/28/2024  6:17 PM EDT -----  Please notify patient that echocardiogram demonstrates normal heart muscle function and no abnormalities suggestive of a cardiac origin of her symptoms.  Continue as directed and follow-up with primary care and we will further evaluate if additional concerns develop

## 2024-12-03 ENCOUNTER — TELEPHONE (OUTPATIENT)
Dept: CARDIOLOGY CLINIC | Age: 74
End: 2024-12-03

## 2024-12-03 NOTE — TELEPHONE ENCOUNTER
Pt phnd states Dr Estrella/PCP did 2 EKG's on her & the results have changed since last time pt saw Dr Bethea.  Was advsd to schedule an appt with Dr Bethea to review.  Pt is having Dr Estrella send over the EKG information, and would like an appt with Dr Bethea.  Please call pt to schedule [phone]

## 2024-12-16 PROBLEM — I77.810 DILATION OF THORACIC AORTA (HCC): Status: ACTIVE | Noted: 2024-12-16

## 2024-12-17 ENCOUNTER — OFFICE VISIT (OUTPATIENT)
Dept: CARDIOLOGY CLINIC | Age: 74
End: 2024-12-17
Payer: MEDICARE

## 2024-12-17 VITALS
WEIGHT: 118 LBS | HEIGHT: 66 IN | HEART RATE: 91 BPM | BODY MASS INDEX: 18.96 KG/M2 | DIASTOLIC BLOOD PRESSURE: 96 MMHG | SYSTOLIC BLOOD PRESSURE: 138 MMHG | RESPIRATION RATE: 18 BRPM

## 2024-12-17 DIAGNOSIS — I47.10 SUPRAVENTRICULAR TACHYCARDIA (HCC): ICD-10-CM

## 2024-12-17 DIAGNOSIS — Q21.12 PATENT FORAMEN OVALE: ICD-10-CM

## 2024-12-17 DIAGNOSIS — J96.11 CHRONIC HYPOXIC RESPIRATORY FAILURE: ICD-10-CM

## 2024-12-17 DIAGNOSIS — I77.810 DILATION OF THORACIC AORTA (HCC): ICD-10-CM

## 2024-12-17 DIAGNOSIS — I10 PRIMARY HYPERTENSION: ICD-10-CM

## 2024-12-17 DIAGNOSIS — J44.9 CHRONIC OBSTRUCTIVE PULMONARY DISEASE, UNSPECIFIED COPD TYPE (HCC): ICD-10-CM

## 2024-12-17 DIAGNOSIS — R07.9 CHEST PAIN, UNSPECIFIED TYPE: Primary | ICD-10-CM

## 2024-12-17 DIAGNOSIS — E78.00 PURE HYPERCHOLESTEROLEMIA: ICD-10-CM

## 2024-12-17 PROCEDURE — 1160F RVW MEDS BY RX/DR IN RCRD: CPT | Performed by: INTERNAL MEDICINE

## 2024-12-17 PROCEDURE — 3075F SYST BP GE 130 - 139MM HG: CPT | Performed by: INTERNAL MEDICINE

## 2024-12-17 PROCEDURE — 93000 ELECTROCARDIOGRAM COMPLETE: CPT | Performed by: INTERNAL MEDICINE

## 2024-12-17 PROCEDURE — 3080F DIAST BP >= 90 MM HG: CPT | Performed by: INTERNAL MEDICINE

## 2024-12-17 PROCEDURE — 1123F ACP DISCUSS/DSCN MKR DOCD: CPT | Performed by: INTERNAL MEDICINE

## 2024-12-17 PROCEDURE — 99215 OFFICE O/P EST HI 40 MIN: CPT | Performed by: INTERNAL MEDICINE

## 2024-12-17 PROCEDURE — 1159F MED LIST DOCD IN RCRD: CPT | Performed by: INTERNAL MEDICINE

## 2024-12-17 NOTE — PROGRESS NOTES
OUTPATIENT CARDIOLOGY FOLLOW-UP    Name: Vita Avila    Age: 74 y.o.    Primary Care Physician: Jacky Estrella DO    Date of Service: 12/17/2024    Chief Complaint: Precordial chest pain, patent foramen ovale, hypertension, chronic obstructive lung disease with associated chronic hypoxic respiratory failure, thoracic aortic dilation    Interim History: Since her prior evaluation, the patient underwent an echocardiogram demonstrating evidence of a normal-sized left ventricular chamber with normal left ventricular systolic function and no evidence of significant valvular abnormalities and an incidental finding of a patent foramen ovale.  Arrhythmia monitoring demonstrated a predominance of sinus rhythm with paroxysmal supraventricular tachycardia and a maximum duration of approximately 15 seconds with heart rates of approximately 145 bpm in the absence of symptoms and with no evidence of additional atrial arrhythmias.  In the interim, she experienced a single episode of nocturnal chest discomfort described as a pressure-like sensation within her left precordium radiating to her neck and severe enough that she was unable to communicate this to family to seek medical attention with a resting electrocardiogram obtained in your office subsequent to her episode demonstrating evidence of sinus rhythm with a delayed precordial transition zone and no significant changes from that of her prior tracing.  She additionally has undergone interim magnetic resonance imaging demonstrating no evidence of acute abnormalities and otherwise predominately notable for volume loss.  Subsequent to her above referenced episode of discomfort she denies subsequent recurrences nor additional symptomatology otherwise suggestive of of anginal-like chest discomfort or other ischemic equivalents.  On the day of evaluation stage I hypertension is noted on her existing medical regimen with a most recent LDL cholesterol level of 120

## 2025-01-02 ENCOUNTER — TELEPHONE (OUTPATIENT)
Dept: CARDIOLOGY | Age: 75
End: 2025-01-02

## 2025-01-02 NOTE — TELEPHONE ENCOUNTER
Spoke with patient and confirmed Lexiscan stress test appointment on January 7, 2024 at 0930. Instructions for test,, and COVID-19 preprocedure information reviewed with patient, questions answered. Patient verbalized understanding.

## 2025-02-05 ENCOUNTER — TELEPHONE (OUTPATIENT)
Dept: CARDIOLOGY | Age: 75
End: 2025-02-05

## 2025-02-05 NOTE — TELEPHONE ENCOUNTER
Left message on patient voice mail to reschedule Lexiscan stress test that patient cancelled on January 7, 2025.

## 2025-04-10 ENCOUNTER — TELEPHONE (OUTPATIENT)
Dept: CARDIOLOGY | Age: 75
End: 2025-04-10

## 2025-04-10 NOTE — TELEPHONE ENCOUNTER
Spoke with patient and confirmed pharmacological  stress test appointment on 4/10/2025 at 0800. Instructions for test,given including NPO after MN except for sips of water with morning medications, no caffeine 12 hours before the test , and pre procedure information reviewed with patient, questions answered. Patient verbalized understanding.

## 2025-04-14 ENCOUNTER — HOSPITAL ENCOUNTER (OUTPATIENT)
Dept: CARDIOLOGY | Age: 75
Discharge: HOME | End: 2025-04-16
Attending: INTERNAL MEDICINE
Payer: MEDICARE

## 2025-04-14 VITALS
OXYGEN SATURATION: 95 % | BODY MASS INDEX: 18.96 KG/M2 | DIASTOLIC BLOOD PRESSURE: 90 MMHG | HEART RATE: 83 BPM | WEIGHT: 118 LBS | SYSTOLIC BLOOD PRESSURE: 122 MMHG | HEIGHT: 66 IN | RESPIRATION RATE: 18 BRPM

## 2025-04-14 DIAGNOSIS — R07.9 CHEST PAIN, UNSPECIFIED TYPE: ICD-10-CM

## 2025-04-14 LAB
ECHO BSA: 1.58 M2
NUC STRESS EJECTION FRACTION: 71 %
STRESS BASELINE DIAS BP: 90 MMHG
STRESS BASELINE HR: 75 BPM
STRESS BASELINE SYS BP: 122 MMHG
STRESS ESTIMATED WORKLOAD: 1 METS
STRESS O2 SAT REST: 95 %
STRESS PEAK DIAS BP: 88 MMHG
STRESS PEAK SYS BP: 153 MMHG
STRESS PERCENT HR ACHIEVED: 88 %
STRESS POST PEAK HR: 129 BPM
STRESS RATE PRESSURE PRODUCT: NORMAL BPM*MMHG
STRESS ST DEPRESSION: 0 MM
STRESS TARGET HR: 147 BPM
TID: 1.08

## 2025-04-14 PROCEDURE — 93017 CV STRESS TEST TRACING ONLY: CPT

## 2025-04-14 PROCEDURE — 6360000002 HC RX W HCPCS: Performed by: INTERNAL MEDICINE

## 2025-04-14 PROCEDURE — 3430000000 HC RX DIAGNOSTIC RADIOPHARMACEUTICAL: Performed by: INTERNAL MEDICINE

## 2025-04-14 PROCEDURE — A9502 TC99M TETROFOSMIN: HCPCS | Performed by: INTERNAL MEDICINE

## 2025-04-14 PROCEDURE — 2500000003 HC RX 250 WO HCPCS: Performed by: INTERNAL MEDICINE

## 2025-04-14 RX ORDER — SODIUM CHLORIDE 0.9 % (FLUSH) 0.9 %
10 SYRINGE (ML) INJECTION PRN
Status: DISCONTINUED | OUTPATIENT
Start: 2025-04-14 | End: 2025-04-17 | Stop reason: HOSPADM

## 2025-04-14 RX ORDER — FUROSEMIDE 20 MG/1
20 TABLET ORAL PRN
COMMUNITY

## 2025-04-14 RX ORDER — ACETAMINOPHEN 325 MG/1
650 TABLET ORAL EVERY 6 HOURS PRN
COMMUNITY

## 2025-04-14 RX ORDER — REGADENOSON 0.08 MG/ML
0.4 INJECTION, SOLUTION INTRAVENOUS
Status: COMPLETED | OUTPATIENT
Start: 2025-04-14 | End: 2025-04-14

## 2025-04-14 RX ADMIN — SODIUM CHLORIDE, PRESERVATIVE FREE 10 ML: 5 INJECTION INTRAVENOUS at 09:19

## 2025-04-14 RX ADMIN — TETROFOSMIN 34.7 MILLICURIE: 0.23 INJECTION, POWDER, LYOPHILIZED, FOR SOLUTION INTRAVENOUS at 09:18

## 2025-04-14 RX ADMIN — REGADENOSON 0.4 MG: 0.08 INJECTION, SOLUTION INTRAVENOUS at 09:18

## 2025-04-14 RX ADMIN — SODIUM CHLORIDE, PRESERVATIVE FREE 10 ML: 5 INJECTION INTRAVENOUS at 08:00

## 2025-04-14 RX ADMIN — TETROFOSMIN 10.5 MILLICURIE: 0.23 INJECTION, POWDER, LYOPHILIZED, FOR SOLUTION INTRAVENOUS at 07:59

## 2025-04-15 ENCOUNTER — TELEPHONE (OUTPATIENT)
Dept: CARDIOLOGY CLINIC | Age: 75
End: 2025-04-15

## 2025-04-15 NOTE — TELEPHONE ENCOUNTER
----- Message from Dr. Lucio Bethea MD sent at 4/15/2025  6:29 AM EDT -----  Please notify patient his stress test is normal and suggests low risk of cardiac events.  Recommend continuation of therapy as present and if present follow-up with primary care and will further evaluate if additional concerns develop